# Patient Record
Sex: MALE | Race: WHITE | NOT HISPANIC OR LATINO | Employment: OTHER | URBAN - METROPOLITAN AREA
[De-identification: names, ages, dates, MRNs, and addresses within clinical notes are randomized per-mention and may not be internally consistent; named-entity substitution may affect disease eponyms.]

---

## 2017-03-07 ENCOUNTER — OFFICE VISIT (OUTPATIENT)
Dept: SPORTS MEDICINE | Facility: CLINIC | Age: 67
End: 2017-03-07
Payer: MEDICARE

## 2017-03-07 VITALS
HEART RATE: 94 BPM | DIASTOLIC BLOOD PRESSURE: 83 MMHG | HEIGHT: 75 IN | SYSTOLIC BLOOD PRESSURE: 126 MMHG | BODY MASS INDEX: 39.17 KG/M2 | WEIGHT: 315 LBS

## 2017-03-07 DIAGNOSIS — M25.562 LEFT KNEE PAIN: Primary | ICD-10-CM

## 2017-03-07 DIAGNOSIS — S83.249A MEDIAL MENISCUS TEAR: ICD-10-CM

## 2017-03-07 PROCEDURE — 99999 PR PBB SHADOW E&M-EST. PATIENT-LVL III: CPT | Mod: PBBFAC,,, | Performed by: ORTHOPAEDIC SURGERY

## 2017-03-07 PROCEDURE — 99203 OFFICE O/P NEW LOW 30 MIN: CPT | Mod: S$PBB,,, | Performed by: ORTHOPAEDIC SURGERY

## 2017-03-07 PROCEDURE — 99213 OFFICE O/P EST LOW 20 MIN: CPT | Mod: PBBFAC,PO | Performed by: ORTHOPAEDIC SURGERY

## 2017-03-07 NOTE — MR AVS SNAPSHOT
[facility]  [address]  [facility] [address]  Phone: [phone]                  Shon Asencio   3/7/2017 10:30 AM   Appointment    Description:  Male : 1950   Provider:  Aris Lopez MD   Department:  [facility]                To Do List           Future Appointments        Provider Department Dept Phone    3/23/2017 9:15 AM Aris Lopez MD [facility] [phone]      Goals (5 Years of Data)     None      [facility]     [facility] -  Assistance  Registered nurses in the [facility] Center provide clinical advisement, health education, appointment booking, and other advisory services.  Call for this free service at [phone].             Medications           Message regarding Medications     Verify the changes and/or additions to your medication regime listed below are the same as discussed with your clinician today.  If any of these changes or additions are incorrect, please notify your healthcare provider.             Verify that the below list of medications is an accurate representation of the medications you are currently taking.  If none reported, the list may be blank. If incorrect, please contact your healthcare provider. Carry this list with you in case of emergency.           Current Medications     celecoxib (CELEBREX) 200 MG capsule Take by mouth.    dabigatran etexilate (PRADAXA) 75 mg Cap Take by mouth.    garlic 1 mg Cap Take by mouth.    omega 3-dha-epa-fish oil 1,200 (144-216) mg Cap Take by mouth.           Clinical Reference Information           Allergies as of 3/7/2017     No Known Drug Allergies      Immunizations Administered on Date of Encounter - 3/7/2017     None      [facility] Sign-Up     Activating your [facility] account is as easy as 1-2-3!     1) Visit [email or web address], select Sign Up Now, enter this activation code and your date of birth, then select  Next.  [email or web address]  Expires: 4/21/2017 10:33 AM      2) Create a username and password to use when you visit [facility] in the future and select a security question in case you lose your password and select Next.    3) Enter your e-mail address and click Sign Up!    Additional Information  If you have questions, please e-mail [email or web address] or call [phone] to talk to our [employer] staff. Remember, [facility] is NOT to be used for urgent needs. For medical emergencies, dial 911.         Language Assistance Services     ATTENTION: Language assistance services are available, free of charge. Please call [phone].      ATENCIÓN: Si dakota valente, tiene a deleon disposición servicios gratuitos de asistencia lingüística. Llame al [phone].     [facility] Ý: N?u b?n nói Ti?ng Vi?t, có các d?ch v? h? tr? ngôn ng? mi?n phí dành cho b?n. G?i s? [phone].         [facility] complies with applicable Federal civil rights laws and does not discriminate on the basis of race, color, national origin, age, disability, or sex.

## 2017-03-07 NOTE — PROGRESS NOTES
CC: Left knee pain    66 y.o. Male with a history of left knee pain who tripped and fell directly on his left knee about 2 months ago.  No prior injuries or surgeries to the left knee.  He has failed 4-5 weeks of physical therapy.  He states that the pain is severe and not responding to any conservative care.      He reports that the pain and weakness. It also bothers him at night.    + mechanical symptoms, - instability    Is affecting ADLs.  Pain is 3/10 at it's worst.     REVIEW OF SYSTEMS:  Constitution: Negative. Negative for chills, fever and night sweats.   HENT: Negative for congestion and headaches.    Eyes: Negative for blurred vision, left vision loss and right vision loss.   Cardiovascular: Negative for chest pain and syncope.   Respiratory: Negative for cough and shortness of breath.    Endocrine: Negative for polydipsia, polyphagia and polyuria.   Hematologic/Lymphatic: Negative for bleeding problem. Does not bruise/bleed easily.   Skin: Negative for dry skin, itching and rash.   Musculoskeletal: Negative for falls. Positive for left knee pain and  muscle weakness.   Gastrointestinal: Negative for abdominal pain and bowel incontinence.   Genitourinary: Negative for bladder incontinence and nocturia.   Neurological: Negative for disturbances in coordination, loss of balance and seizures.   Psychiatric/Behavioral: Negative for depression. The patient does not have insomnia.    Allergic/Immunologic: Negative for hives and persistent infections.     PAST MEDICAL HISTORY:    Past Medical History:   Diagnosis Date    Basal cell carcinoma        PAST SURGICAL HISTORY:   History reviewed. No pertinent surgical history.    FAMILY HISTORY:   No family history on file.    SOCIAL HISTORY:   Social History     Social History    Marital status:      Spouse name: N/A    Number of children: N/A    Years of education: N/A     Occupational History    Not on file.     Social History Main Topics    Smoking  "status: Never Smoker    Smokeless tobacco: Not on file    Alcohol use No    Drug use: Not on file    Sexual activity: Not on file     Other Topics Concern    Not on file     Social History Narrative       MEDICATIONS:     Current Outpatient Prescriptions:     celecoxib (CELEBREX) 200 MG capsule, Take by mouth., Disp: , Rfl:     dabigatran etexilate (PRADAXA) 75 mg Cap, Take by mouth., Disp: , Rfl:     garlic 1 mg Cap, Take by mouth., Disp: , Rfl:     omega 3-dha-epa-fish oil 1,200 (144-216) mg Cap, Take by mouth., Disp: , Rfl:     ALLERGIES:   Review of patient's allergies indicates:   Allergen Reactions    No known drug allergies        VITAL SIGNS:   /83  Pulse 94  Ht 6' 3" (1.905 m)  Wt (!) 154.2 kg (340 lb)  BMI 42.5 kg/m2     PHYSICAL EXAMINATION  General:  The patient is alert and oriented x 3.  Mood is pleasant.  Observation of ears, eyes and nose reveal no gross abnormalities.  No labored breathing observed.    LEFT KNEE EXAMINATION     OBSERVATION / INSPECTION   Gait:   Antalgic   Alignment:  Neutral   Scars:   None   Muscle atrophy: Mild  Effusion:  Mild  Warmth:  None   Discoloration:   none     TENDERNESS / CREPITUS (T / C):          T / C      T / C   Patella   - / -   Lateral joint line   + / -   Peripatellar medial  -  Medial joint line    + / -   Peripatellar lateral -  Medial plica   - / -   Patellar tendon -   Popliteal fossa   - / -   Quad tendon   -   Gastrocnemius   -   Prepatellar Bursa - / -   Quadricep   -   Tibial tubercle  -  Thigh/hamstring  -   Pes anserine/HS -  Fibula    -   ITB   - / -  Tibia     -   Tib/fib joint  - / -  LCL    -     MFC   - / -   MCL: Proximal  -    LFC   - / -    Distal   -          ROM: (* = pain)  PASSIVE   ACTIVE    Left :   5 / 0 / 135   5 / 0 / 135     Right :    5 / 0 / 135   5 / 0 / 135    Patellofemoral examination:  See above noted areas of tenderness.   Patella position    Subluxation / dislocation: Centered           Sup. / " Inf;   Normal   Crepitus (PF):    Absent   Patellar Mobility:       Medial-lateral:   Normal    Superior-inferior:  Normal    Inferior tilt   Normal    Patellar tendon:  Normal   Lateral tilt:    Normal   J-sign:     None   Patellofemoral grind:   No pain       MENISCAL SIGNS:     Pain on terminal extension:  -  Pain on terminal flexion:  +  Mechelles maneuver:  + (for pain)  Squat     + (for pain)    LIGAMENT EXAMINATION:  ACL / Lachman:  normal (-1 to 2mm)    PCL-Post.  drawer: normal 0 to 2mm  MCL- Valgus:  normal 0 to 2mm  LCL- Varus:  normal 0 to 2mm  Pivot shift: normal (Equal)   Dial Test: difference c/w other side   At 30° flexion: normal (< 5°)    At 90° flexion: normal (< 5°)   Reverse Pivot Shift:   normal (Equal)     STRENGTH: (* = with pain) PAINFUL SIDE   Quadricep   4+/5   Hamstrin+/5    EXTREMITY NEURO-VASCULAR EXAMINATION:   Sensation:  Grossly intact to light touch all dermatomal regions.   Motor Function:  Fully intact motor function at hip, knee, foot and ankle    DTRs;  quadriceps and  achilles 2+.  No clonus and downgoing Babinski.    Vascular status:  DP and PT pulses 2+, brisk capillary refill, symmetric.     MRI (17):   1.  Medial meniscus tear as above.  2.  Mild fibrillation of the articular cartilage of the lateral patellar facet without subchondral edema.  3.  Small joint effusion with synovitis.     ASSESSMENT:    Left knee pain  Left knee medial mensicus tear    PLAN:   Treatment options were discussed with the patient about his knee.  I reviewed the MRI images with his, including the relevant anatomy, and what this means for his knee.    We discussed both non-operative and operative options for his knee and the risks and benefits of each.    I feel like he would benefit from surgery for his knee.  After a discussion of specific risks and benefits, he would like to proceed with setting this up.    These risks include: bleeding, infection, scarring, damage to  neurovascular structures, damage to cartilage, stiffness, blood clots, pulmonary embolism, swelling, compartment syndrome, need for further surgery, and the risks of anesthesia.      He verbalized his understanding of these risks and wished to proceed with surgery.    A total of 25 minutes were spent face-to-face with the patient during this encounter and over half of that time was spent on counseling about treatment options including his choice for surgery and coordination of his care for his preoperative visits, surgery and post-operative rehab.  We also had a detailed discussion of his expectation level for this procedure as well as any limitations at home or work and with exercising following surgery.     The operative plan is:    left   1. Arthroscopic partial meniscectomy  2. Possible arthroscopic synovectomy  3. Possible arthroscopic loose body removal  4. Possible arthroscopic chondroplasty    Position: Supine    Patient will  need medical clearance from PCP and Cardiology prior to the pre-operative appointment.    If he wishes to proceed, we'll get his scheduled for this at his convenience around his work schedule.    All questions were answered, pt will contact us for questions or concerns in the interim.

## 2017-03-08 DIAGNOSIS — S83.242A ACUTE MEDIAL MENISCUS TEAR, LEFT, INITIAL ENCOUNTER: Primary | ICD-10-CM

## 2017-03-13 ENCOUNTER — TELEPHONE (OUTPATIENT)
Dept: SPORTS MEDICINE | Facility: CLINIC | Age: 67
End: 2017-03-13

## 2017-03-13 NOTE — TELEPHONE ENCOUNTER
Patient asked if he'll need a ride to and from sx and if the sx was an outpatient procedure.    Patient was informed that he will need transportation and it is an outpatient procedure.

## 2017-03-13 NOTE — TELEPHONE ENCOUNTER
----- Message from Amanda Dodd sent at 3/13/2017  2:26 PM CDT -----  Contact: pt  Pt called and states he has some questions to ask before his surgery on 3/22/17. Pt states he would like for the nurse to call him in regards to his questions. Pt can be reached at [phone].

## 2017-03-17 ENCOUNTER — OFFICE VISIT (OUTPATIENT)
Dept: SPORTS MEDICINE | Facility: CLINIC | Age: 67
End: 2017-03-17
Payer: MEDICARE

## 2017-03-17 VITALS
HEART RATE: 103 BPM | DIASTOLIC BLOOD PRESSURE: 78 MMHG | SYSTOLIC BLOOD PRESSURE: 109 MMHG | WEIGHT: 315 LBS | BODY MASS INDEX: 39.17 KG/M2 | HEIGHT: 75 IN

## 2017-03-17 DIAGNOSIS — G89.29 CHRONIC PAIN OF LEFT KNEE: Primary | ICD-10-CM

## 2017-03-17 DIAGNOSIS — M25.562 CHRONIC PAIN OF LEFT KNEE: Primary | ICD-10-CM

## 2017-03-17 PROCEDURE — 99499 UNLISTED E&M SERVICE: CPT | Mod: S$PBB,,, | Performed by: PHYSICIAN ASSISTANT

## 2017-03-17 PROCEDURE — 99999 PR PBB SHADOW E&M-EST. PATIENT-LVL IV: CPT | Mod: PBBFAC,,, | Performed by: PHYSICIAN ASSISTANT

## 2017-03-17 PROCEDURE — 99214 OFFICE O/P EST MOD 30 MIN: CPT | Mod: PBBFAC,PO | Performed by: PHYSICIAN ASSISTANT

## 2017-03-17 RX ORDER — PROMETHAZINE HYDROCHLORIDE 25 MG/1
25 TABLET ORAL EVERY 6 HOURS PRN
Qty: 30 TABLET | Refills: 0 | Status: SHIPPED | OUTPATIENT
Start: 2017-03-17 | End: 2017-03-17 | Stop reason: SDUPTHER

## 2017-03-17 RX ORDER — METOPROLOL SUCCINATE 25 MG/1
25 TABLET, EXTENDED RELEASE ORAL DAILY
COMMUNITY
End: 2019-12-10 | Stop reason: SDUPTHER

## 2017-03-17 RX ORDER — OXYCODONE AND ACETAMINOPHEN 10; 325 MG/1; MG/1
TABLET ORAL
Qty: 60 TABLET | Refills: 0 | Status: SHIPPED | OUTPATIENT
Start: 2017-03-17 | End: 2018-03-12 | Stop reason: ALTCHOICE

## 2017-03-17 RX ORDER — PROMETHAZINE HYDROCHLORIDE 25 MG/1
25 TABLET ORAL EVERY 6 HOURS PRN
Qty: 30 TABLET | Refills: 0 | Status: SHIPPED | OUTPATIENT
Start: 2017-03-17 | End: 2017-10-19

## 2017-03-17 RX ORDER — TRAMADOL HYDROCHLORIDE 50 MG/1
50-100 TABLET ORAL EVERY 6 HOURS PRN
Qty: 60 TABLET | Refills: 0 | Status: SHIPPED | OUTPATIENT
Start: 2017-03-17 | End: 2017-10-19

## 2017-03-17 NOTE — MR AVS SNAPSHOT
[facility]  [address]  [facility] [address]  Phone: [phone]                  Shon Asencio   3/17/2017 9:00 AM   Appointment    Description:  Male : 1950   Provider:  Chay Haney III, PA-C   Department:  [facility]                To Do List           Future Appointments        Provider Department Dept Phone    3/17/2017 9:00 AM Chay Haney III, PA-C [facility] [phone]    3/17/2017 10:00 AM PRE-ADMIT, [facility] [phone]    2017 9:15 AM Aris Lopez MD [facility] [phone]    2017 8:30 AM Chay Haney III, PA-C [facility] [phone]    2017 2:15 PM Jonathan Patterson MD [facility] [place] [phone]      Your Future Surgeries/Procedures     Mar 22, 2017   Surgery with Aris Lopez MD   [facility]    [address]  [facility] [id number] [phone]              Goals (5 Years of Data)     None      [facility]     [facility] Care [facility] - 24/7 Assistance  Registered nurses in the [facility] provide clinical advisement, health education, appointment booking, and other advisory services.  Call for this free service at [phone].             Medications           Message regarding Medications     Verify the changes and/or additions to your medication regime listed below are the same as discussed with your clinician today.  If any of these changes or additions are incorrect, please notify your healthcare provider.             Verify that the below list of medications is an accurate representation of the medications you are currently taking.  If none reported, the list may be blank. If incorrect, please contact your healthcare provider. Carry this list with you in case of emergency.           Current Medications     celecoxib  (CELEBREX) 200 MG capsule Take by mouth.    dabigatran etexilate (PRADAXA) 75 mg Cap Take by mouth.    garlic 1 mg Cap Take by mouth.    omega 3-dha-epa-fish oil 1,200 (144-216) mg Cap Take by mouth.           Clinical Reference Information           Allergies as of 3/17/2017     No Known Drug Allergies      Immunizations Administered on Date of Encounter - 3/17/2017     None      [facility] Sign-Up     Activating your [facility] account is as easy as 1-2-3!     1) Visit [email or web address], select Sign Up Now, enter this activation code and your date of birth, then select Next.  [email or web address]  Expires: 4/21/2017 11:33 AM      2) Create a username and password to use when you visit [facility] in the future and select a security question in case you lose your password and select Next.    3) Enter your e-mail address and click Sign Up!    Additional Information  If you have questions, please e-mail [email or web address] or call [phone] to talk to our [facility] staff. Remember, [facility] is NOT to be used for urgent needs. For medical emergencies, dial 911.         Language Assistance Services     ATTENTION: Language assistance services are available, free of charge. Please call [phone].      ATENCIÓN: Si habla español, tiene a deleon disposición servicios gratuitos de asistencia lingüística. Llame al [phone].     CHÚ Ý: N?u b?n nói Ti?ng Vi?t, có các d?ch v? h? tr? ngôn ng? mi?n phí dành cho b?n. G?i s? [phone].         [facility] complies with applicable Federal civil rights laws and does not discriminate on the basis of race, color, national origin, age, disability, or sex.

## 2017-03-21 ENCOUNTER — ANESTHESIA EVENT (OUTPATIENT)
Dept: SURGERY | Facility: OTHER | Age: 67
End: 2017-03-21
Payer: MEDICARE

## 2017-03-21 ENCOUNTER — HOSPITAL ENCOUNTER (OUTPATIENT)
Dept: PREADMISSION TESTING | Facility: OTHER | Age: 67
Discharge: HOME | End: 2017-03-21
Attending: ORTHOPAEDIC SURGERY
Payer: MEDICARE

## 2017-03-21 VITALS
DIASTOLIC BLOOD PRESSURE: 73 MMHG | HEART RATE: 85 BPM | TEMPERATURE: 98 F | BODY MASS INDEX: 39.17 KG/M2 | WEIGHT: 315 LBS | SYSTOLIC BLOOD PRESSURE: 112 MMHG | OXYGEN SATURATION: 97 % | HEIGHT: 75 IN

## 2017-03-21 RX ORDER — FAMOTIDINE 20 MG/1
20 TABLET, FILM COATED ORAL
Status: CANCELLED | OUTPATIENT
Start: 2017-03-21 | End: 2017-03-21

## 2017-03-21 RX ORDER — CELECOXIB 100 MG/1
100 CAPSULE ORAL DAILY
Status: ON HOLD | COMMUNITY
End: 2017-03-22 | Stop reason: HOSPADM

## 2017-03-21 RX ORDER — OXYCODONE HYDROCHLORIDE 5 MG/1
5 TABLET ORAL ONCE AS NEEDED
Status: CANCELLED | OUTPATIENT
Start: 2017-03-21 | End: 2017-03-21

## 2017-03-21 RX ORDER — MIDAZOLAM HYDROCHLORIDE 5 MG/ML
4 INJECTION INTRAMUSCULAR; INTRAVENOUS
Status: CANCELLED | OUTPATIENT
Start: 2017-03-21

## 2017-03-21 RX ORDER — LIDOCAINE HYDROCHLORIDE 10 MG/ML
1 INJECTION, SOLUTION EPIDURAL; INFILTRATION; INTRACAUDAL; PERINEURAL ONCE
Status: CANCELLED | OUTPATIENT
Start: 2017-03-21 | End: 2017-03-21

## 2017-03-21 RX ORDER — SODIUM CHLORIDE, SODIUM LACTATE, POTASSIUM CHLORIDE, CALCIUM CHLORIDE 600; 310; 30; 20 MG/100ML; MG/100ML; MG/100ML; MG/100ML
INJECTION, SOLUTION INTRAVENOUS CONTINUOUS
Status: CANCELLED | OUTPATIENT
Start: 2017-03-21

## 2017-03-21 NOTE — IP AVS SNAPSHOT
[facility] Location ([facility])  [address] [place], [place] [address]  Phone: [phone]           Patient Discharge Instructions    Our goal is to set you up for success. This packet includes information on your condition, medications, and your home care. It will help you to care for yourself so you don't get sicker.     Please ask your nurse if you have any questions.        There are many details to remember when preparing for your surgery. Here is what you will need to do, please ask your nurse if there are more specific instructions and if you have any questions:    1. 24 hours before procedure Do not smoke or drink alcoholic beverages 24 hours prior to your procedure    2. Eating before procedure Do not eat or drink anything 8 hours before your procedure - this includes gum, mints, and candy.     3. Day of procedure Please remove all jewelry for the procedure. If you wear contact lenses, dentures, hearing aids or glasses, bring a container to put them in during your surgery and give to a family member for safekeeping.  If your doctor has scheduled you for an overnight stay, bring a small overnight bag with any personal items that you need.    4. After procedure Make arrangements in advance for transportation home by a responsible adult. It is not safe to drive a vehicle during the 24 hours following surgery.     PLEASE NOTE: You may be contacted the day before your surgery to confirm your surgery date and arrival time. The Surgery schedule has many variables which may affect the time of your surgery case. Family members should be available if your surgery time changes.                [facility]  Unless otherwise directed by your provider, please contact [facility], our nurse care line that is available for 24/7 assistance.     [phone] (toll-free)    Registered nurses in the [facility] provide clinical advisement, health education, appointment booking, and other  advisory services.                    ** Verify the list of medication(s) below is accurate and up to date. Carry this with you in case of emergency. If your medications have changed, please notify your healthcare provider.             Medication List      TAKE these medications        Additional Info                      celecoxib 100 MG capsule   Commonly known as:  CeleBREX   Refills:  0   Dose:  100 mg   What changed:  Another medication with the same name was removed. Continue taking this medication, and follow the directions you see here.    Instructions:  Take 100 mg by mouth once daily.     Begin Date    AM    Noon    PM    Bedtime       garlic 1 mg Cap   Refills:  0   Dose:  1 tablet    Instructions:  Take 1 tablet by mouth once daily.     Begin Date    AM    Noon    PM    Bedtime       metoprolol succinate 25 MG 24 hr tablet   Commonly known as:  TOPROL-XL   Refills:  0   Dose:  25 mg    Instructions:  Take 25 mg by mouth once daily. Pt. Takes 25 mg in morning and 12.5 in the evening.     Begin Date    AM    Noon    PM    Bedtime       omega 3-dha-epa-fish oil 1,200 (144-216) mg Cap   Refills:  0    Instructions:  Take by mouth.     Begin Date    AM    Noon    PM    Bedtime       oxycodone-acetaminophen  mg per tablet   Commonly known as:  PERCOCET   Quantity:  60 tablet   Refills:  0    Instructions:  Take 1 tablet by mouth every 4-6 hours as needed for pain. Take stool softener with this medication.     Begin Date    AM    Noon    PM    Bedtime       PRADAXA 75 mg Cap   Refills:  0   Generic drug:  dabigatran etexilate    Instructions:  Take by mouth.     Begin Date    AM    Noon    PM    Bedtime       promethazine 25 MG tablet   Commonly known as:  PHENERGAN   Quantity:  30 tablet   Refills:  0   Dose:  25 mg    Instructions:  Take 1 tablet (25 mg total) by mouth every 6 (six) hours as needed for Nausea.     Begin Date    AM    Noon    PM    Bedtime       saw palmetto 80 MG capsule   Refills:  0    Dose:  80 mg    Instructions:  Take 80 mg by mouth once daily. Takes 2 tabs daily     Begin Date    AM    Noon    PM    Bedtime       tramadol 50 mg tablet   Commonly known as:  ULTRAM   Quantity:  60 tablet   Refills:  0   Dose:   mg    Instructions:  Take 1-2 tablets ( mg total) by mouth every 6 (six) hours as needed for Pain.     Begin Date    AM    Noon    PM    Bedtime                  Please bring to all follow up appointments:    1. A copy of your discharge instructions.  2. All medicines you are currently taking in their original bottles.  3. Identification and insurance card.    Please arrive 15 minutes ahead of scheduled appointment time.    Please call 24 hours in advance if you must reschedule your appointment and/or time.        Your Scheduled Appointments     Apr 04, 2017  9:15 AM CDT   Established Patient Visit with Aris Lopez MD   [facility]    [address]  [facility] [address]   [phone]            Apr 05, 2017  8:30 AM CDT   Post OP with Chay Haney III, PA-C   [facility]    [address]  [facility] [address]   [phone]            Apr 05, 2017  2:15 PM CDT   New Patient with Jonathan Patterson MD   [facility] ([facility] )    [address]  [place] [address]   [phone]            May 02, 2017 10:00 AM CDT   Post OP with Aris Lopez MD   [facility]    [address]  [facility] [address]   [phone]              Your Future Surgeries/Procedures     Mar 22, 2017   Surgery with Aris Lopez MD   [facility] ([facility])    [address] [facility] [id number] [phone]                  Discharge Instructions       PRE-ADMIT TESTING -  [phone]    [address] [place] [address]        OUTPATIENT SURGERY UNIT - [phone]    Your surgery has been  scheduled at [facility]. We are pleased to have the opportunity to serve you. For Further Information please call [phone].    On the day of surgery please report to the Information Desk on the 1st floor.    CONTACT YOUR PHYSICIAN'S OFFICE THE DAY PRIOR TO YOUR SURGERY TO OBTAIN YOUR ARRIVAL TIME.     The evening before surgery do not eat anything after 9 p.m. ( this includes hard candy, chewing gum and mints).  You may have GATORADE, POWERADE AND WATER FROM 9 p.m. until leaving home to come to the hospital.   DO NOT DRINK ANY LIQUIDS ON THE WAY TO THE HOSPITAL.     SPECIAL MEDICATION INSTRUCTIONS: TAKE medications checked off by the Anesthesiologist on your Medication List.    Angiogram Patients: Take medications as instructed by your physician, including aspirin.     Surgery Patients:    If you take ASPIRIN - Your PHYSICIAN/SURGEON will need to inform you IF/OR when you need to stop taking aspirin prior to your surgery.     Do Not take any medications containing IBUPROFEN.  Do Not Wear any make-up or dark nail polish   (especially eye make-up) to surgery. If you come to surgery with makeup on you will be required to remove the makeup or nail polish.    Do not shave your surgical area at least 5 days prior to your surgery. The surgical prep will be performed at the hospital according to Infection Control regulations.    Leave all valuables at home.   Do Not wear any jewelry or watches, including any metal in body piercings.  Contact Lens must be removed before surgery. Either do not wear the contact lens or bring a case and solution for storage.  Please bring a container for eyeglasses or dentures as required.  Bring any paperwork your physician has provided, such as consent forms,  history and physicals, doctor's orders, etc.   Bring comfortable clothes that are loose fitting to wear upon discharge. Take into consideration the type of surgery being performed.  Maintain your diet as advised  "per your physician the day prior to surgery.      Adequate rest the night before surgery is advised.   Park in the Parking lot behind the hospital or in the [place] [facility] across the street from the parking lot. Parking is complimentary.  If you will be discharged the same day as your procedure, please arrange for a responsible adult to drive you home or to accompany you if traveling by taxi.   YOU WILL NOT BE PERMITTED TO DRIVE OR TO LEAVE THE HOSPITAL ALONE AFTER SURGERY.   It is strongly recommended that you arrange for someone to remain with you for the first 24 hrs following your surgery.       Thank you for your cooperation.  The Staff of [facility].        Bathing Instructions                                                                 Please shower the evening before and morning of your procedure with    ANTIBACTERIAL SOAP. ( DIAL, etc )  Concentrate on the surgical area   for at least 3 minutes and rinse completely. Dry off as usual.   Do not use any deodorant, powder, body lotions, perfume, after shave or    cologne.                                                Admission Information     Date & Time Provider Department CSN    3/21/2017  7:30 AM Aris Lopez MD [facility] [id number]      Care Providers     Provider Role Specialty Primary office phone    Aris Lopez MD Attending Provider Sports Medicine [phone]      Your Vitals Were     BP Pulse Temp Height Weight SpO2    112/73 85 97.7 °F (36.5 °C) (Oral) 6' 3" (1.905 m) 154.2 kg (340 lb) 97%    BMI                42.5 kg/m2          Recent Lab Values     No lab values to display.      Allergies as of 3/21/2017        Reactions    No Known Drug Allergies       Advance Directives     An advance directive is a document which, in the event you are no longer able to make decisions for yourself, tells your healthcare team what kind of treatment you do or do not want to receive, or who you " would like to make those decisions for you.  If you do not currently have an advance directive, [facility] encourages you to create one.  For more information call:  [phone]), [phone] ([phone]),  or log on to [email or web address].        Smoking Cessation     If you would like to quit smoking:   You may be eligible for free services if you are a [place] resident and started smoking cigarettes before September 1, 1988.  Call the Smoking Cessation Trust (SCT) toll free at (297) 852-1235 or (532) 822-3999.   Call 2-985-QUIT-NOW if you do not meet the above criteria.            Language Assistance Services     ATTENTION: Language assistance services are available, free of charge. Please call [phone].      ATENCIÓN: Si habla español, tiene a deleon disposición servicios gratuitos de asistencia lingüística. Llame al [phone].     CHÚ Ý: N?u b?n nói Ti?ng Vi?t, có các d?ch v? h? tr? ngôn ng? mi?n phí dành cho b?n. G?i s? [phone].        [facility] Information           [facility] Sign-Up     Activating your [facility] account is as easy as 1-2-3!     1) Visit [email or web address], select Sign Up Now, enter this activation code and your date of birth, then select Next.  [email or web address]  Expires: 4/21/2017 11:33 AM      2) Create a username and password to use when you visit [facility] in the future and select a security question in case you lose your password and select Next.    3) Enter your e-mail address and click Sign Up!    Additional Information  If you have questions, please e-mail [email or web address] or call [phone] to talk to our [facility] staff. Remember, [facility] is NOT to be used for urgent needs. For medical emergencies, dial 911.          [facility] complies with applicable Federal civil rights laws and does not discriminate on the basis of race, color, national origin, age, disability, or sex.

## 2017-03-21 NOTE — DISCHARGE INSTRUCTIONS
PRE-ADMIT TESTING -  [phone]    [address]  [facility]        OUTPATIENT SURGERY UNIT - [phone]    Your surgery has been scheduled at [facility]. We are pleased to have the opportunity to serve you. For Further Information please call [phone].    On the day of surgery please report to the Information Desk on the 1st floor.    CONTACT YOUR PHYSICIAN'S OFFICE THE DAY PRIOR TO YOUR SURGERY TO OBTAIN YOUR ARRIVAL TIME.     The evening before surgery do not eat anything after 9 p.m. ( this includes hard candy, chewing gum and mints).  You may have GATORADE, POWERADE AND WATER FROM 9 p.m. until leaving home to come to the hospital.   DO NOT DRINK ANY LIQUIDS ON THE WAY TO THE HOSPITAL.     SPECIAL MEDICATION INSTRUCTIONS: TAKE medications checked off by the Anesthesiologist on your Medication List.    Angiogram Patients: Take medications as instructed by your physician, including aspirin.     Surgery Patients:    If you take ASPIRIN - Your PHYSICIAN/SURGEON will need to inform you IF/OR when you need to stop taking aspirin prior to your surgery.     Do Not take any medications containing IBUPROFEN.  Do Not Wear any make-up or dark nail polish   (especially eye make-up) to surgery. If you come to surgery with makeup on you will be required to remove the makeup or nail polish.    Do not shave your surgical area at least 5 days prior to your surgery. The surgical prep will be performed at the hospital according to Infection Control regulations.    Leave all valuables at home.   Do Not wear any jewelry or watches, including any metal in body piercings.  Contact Lens must be removed before surgery. Either do not wear the contact lens or bring a case and solution for storage.  Please bring a container for eyeglasses or dentures as required.  Bring any paperwork your physician has provided, such as consent forms,  history and physicals, doctor's orders, etc.   Bring comfortable  clothes that are loose fitting to wear upon discharge. Take into consideration the type of surgery being performed.  Maintain your diet as advised per your physician the day prior to surgery.      Adequate rest the night before surgery is advised.   Park in the Parking lot behind the hospital or in the [place] [facility] across the street from the parking lot. Parking is complimentary.  If you will be discharged the same day as your procedure, please arrange for a responsible adult to drive you home or to accompany you if traveling by taxi.   YOU WILL NOT BE PERMITTED TO DRIVE OR TO LEAVE THE HOSPITAL ALONE AFTER SURGERY.   It is strongly recommended that you arrange for someone to remain with you for the first 24 hrs following your surgery.       Thank you for your cooperation.  The Staff of [facility].        Bathing Instructions                                                                 Please shower the evening before and morning of your procedure with    ANTIBACTERIAL SOAP. ( DIAL, etc )  Concentrate on the surgical area   for at least 3 minutes and rinse completely. Dry off as usual.   Do not use any deodorant, powder, body lotions, perfume, after shave or    cologne.

## 2017-03-22 ENCOUNTER — HOSPITAL ENCOUNTER (OUTPATIENT)
Facility: OTHER | Age: 67
Discharge: HOME | End: 2017-03-22
Attending: ORTHOPAEDIC SURGERY | Admitting: ORTHOPAEDIC SURGERY
Payer: MEDICARE

## 2017-03-22 ENCOUNTER — ANESTHESIA (OUTPATIENT)
Dept: SURGERY | Facility: OTHER | Age: 67
End: 2017-03-22
Payer: MEDICARE

## 2017-03-22 ENCOUNTER — SURGERY (OUTPATIENT)
Age: 67
End: 2017-03-22

## 2017-03-22 VITALS
WEIGHT: 315 LBS | SYSTOLIC BLOOD PRESSURE: 115 MMHG | DIASTOLIC BLOOD PRESSURE: 83 MMHG | HEIGHT: 75 IN | TEMPERATURE: 98 F | OXYGEN SATURATION: 98 % | BODY MASS INDEX: 39.17 KG/M2 | HEART RATE: 90 BPM | RESPIRATION RATE: 18 BRPM

## 2017-03-22 DIAGNOSIS — G89.29 CHRONIC PAIN OF LEFT KNEE: ICD-10-CM

## 2017-03-22 DIAGNOSIS — M25.562 CHRONIC PAIN OF LEFT KNEE: ICD-10-CM

## 2017-03-22 DIAGNOSIS — M25.569 KNEE PAIN, UNSPECIFIED CHRONICITY, UNSPECIFIED LATERALITY: Primary | ICD-10-CM

## 2017-03-22 PROCEDURE — 25000003 PHARM REV CODE 250: Performed by: ORTHOPAEDIC SURGERY

## 2017-03-22 PROCEDURE — 63600175 PHARM REV CODE 636 W HCPCS: Performed by: ORTHOPAEDIC SURGERY

## 2017-03-22 PROCEDURE — 63600175 PHARM REV CODE 636 W HCPCS: Performed by: NURSE ANESTHETIST, CERTIFIED REGISTERED

## 2017-03-22 PROCEDURE — 27201423 OPTIME MED/SURG SUP & DEVICES STERILE SUPPLY: Performed by: ORTHOPAEDIC SURGERY

## 2017-03-22 PROCEDURE — 25000003 PHARM REV CODE 250: Performed by: SPECIALIST

## 2017-03-22 PROCEDURE — 36000711: Performed by: ORTHOPAEDIC SURGERY

## 2017-03-22 PROCEDURE — 63600175 PHARM REV CODE 636 W HCPCS: Performed by: SPECIALIST

## 2017-03-22 PROCEDURE — 71000033 HC RECOVERY, INTIAL HOUR: Performed by: ORTHOPAEDIC SURGERY

## 2017-03-22 PROCEDURE — 29880 ARTHRS KNE SRG MNISECTMY M&L: CPT | Mod: LT,GC,, | Performed by: ORTHOPAEDIC SURGERY

## 2017-03-22 PROCEDURE — 36000710: Performed by: ORTHOPAEDIC SURGERY

## 2017-03-22 PROCEDURE — 37000009 HC ANESTHESIA EA ADD 15 MINS: Performed by: ORTHOPAEDIC SURGERY

## 2017-03-22 PROCEDURE — 71000016 HC POSTOP RECOV ADDL HR: Performed by: ORTHOPAEDIC SURGERY

## 2017-03-22 PROCEDURE — 25000003 PHARM REV CODE 250: Performed by: NURSE ANESTHETIST, CERTIFIED REGISTERED

## 2017-03-22 PROCEDURE — 37000008 HC ANESTHESIA 1ST 15 MINUTES: Performed by: ORTHOPAEDIC SURGERY

## 2017-03-22 PROCEDURE — 71000015 HC POSTOP RECOV 1ST HR: Performed by: ORTHOPAEDIC SURGERY

## 2017-03-22 RX ORDER — SODIUM CHLORIDE, SODIUM LACTATE, POTASSIUM CHLORIDE, CALCIUM CHLORIDE 600; 310; 30; 20 MG/100ML; MG/100ML; MG/100ML; MG/100ML
INJECTION, SOLUTION INTRAVENOUS CONTINUOUS
Status: DISCONTINUED | OUTPATIENT
Start: 2017-03-22 | End: 2017-03-22 | Stop reason: HOSPADM

## 2017-03-22 RX ORDER — SODIUM CHLORIDE 0.9 % (FLUSH) 0.9 %
3 SYRINGE (ML) INJECTION
Status: DISCONTINUED | OUTPATIENT
Start: 2017-03-22 | End: 2017-03-22 | Stop reason: HOSPADM

## 2017-03-22 RX ORDER — DIPHENHYDRAMINE HYDROCHLORIDE 50 MG/ML
25 INJECTION INTRAMUSCULAR; INTRAVENOUS EVERY 6 HOURS PRN
Status: DISCONTINUED | OUTPATIENT
Start: 2017-03-22 | End: 2017-03-22 | Stop reason: HOSPADM

## 2017-03-22 RX ORDER — MIDAZOLAM HYDROCHLORIDE 5 MG/ML
4 INJECTION INTRAMUSCULAR; INTRAVENOUS
Status: DISCONTINUED | OUTPATIENT
Start: 2017-03-22 | End: 2017-03-22 | Stop reason: HOSPADM

## 2017-03-22 RX ORDER — EPINEPHRINE 1 MG/ML
INJECTION, SOLUTION INTRACARDIAC; INTRAMUSCULAR; INTRAVENOUS; SUBCUTANEOUS
Status: DISCONTINUED | OUTPATIENT
Start: 2017-03-22 | End: 2017-03-22 | Stop reason: HOSPADM

## 2017-03-22 RX ORDER — OXYCODONE HYDROCHLORIDE 5 MG/1
5 TABLET ORAL
Status: DISCONTINUED | OUTPATIENT
Start: 2017-03-22 | End: 2017-03-22 | Stop reason: HOSPADM

## 2017-03-22 RX ORDER — DEXAMETHASONE SODIUM PHOSPHATE 4 MG/ML
INJECTION, SOLUTION INTRA-ARTICULAR; INTRALESIONAL; INTRAMUSCULAR; INTRAVENOUS; SOFT TISSUE
Status: DISCONTINUED | OUTPATIENT
Start: 2017-03-22 | End: 2017-03-22

## 2017-03-22 RX ORDER — OXYCODONE HYDROCHLORIDE 5 MG/1
5 TABLET ORAL ONCE AS NEEDED
Status: DISCONTINUED | OUTPATIENT
Start: 2017-03-22 | End: 2017-03-22 | Stop reason: HOSPADM

## 2017-03-22 RX ORDER — FAMOTIDINE 20 MG/1
20 TABLET, FILM COATED ORAL
Status: COMPLETED | OUTPATIENT
Start: 2017-03-22 | End: 2017-03-22

## 2017-03-22 RX ORDER — ACETAMINOPHEN 10 MG/ML
INJECTION, SOLUTION INTRAVENOUS
Status: DISCONTINUED | OUTPATIENT
Start: 2017-03-22 | End: 2017-03-22

## 2017-03-22 RX ORDER — MEPERIDINE HYDROCHLORIDE 50 MG/ML
12.5 INJECTION INTRAMUSCULAR; INTRAVENOUS; SUBCUTANEOUS ONCE AS NEEDED
Status: DISCONTINUED | OUTPATIENT
Start: 2017-03-22 | End: 2017-03-22 | Stop reason: HOSPADM

## 2017-03-22 RX ORDER — METOPROLOL TARTRATE 1 MG/ML
INJECTION, SOLUTION INTRAVENOUS
Status: DISCONTINUED | OUTPATIENT
Start: 2017-03-22 | End: 2017-03-22

## 2017-03-22 RX ORDER — FENTANYL CITRATE 50 UG/ML
INJECTION, SOLUTION INTRAMUSCULAR; INTRAVENOUS
Status: DISCONTINUED | OUTPATIENT
Start: 2017-03-22 | End: 2017-03-22

## 2017-03-22 RX ORDER — PROPOFOL 10 MG/ML
VIAL (ML) INTRAVENOUS
Status: DISCONTINUED | OUTPATIENT
Start: 2017-03-22 | End: 2017-03-22

## 2017-03-22 RX ORDER — SODIUM CHLORIDE 0.9 % (FLUSH) 0.9 %
3 SYRINGE (ML) INJECTION EVERY 8 HOURS
Status: DISCONTINUED | OUTPATIENT
Start: 2017-03-22 | End: 2017-03-22 | Stop reason: HOSPADM

## 2017-03-22 RX ORDER — LIDOCAINE HYDROCHLORIDE 10 MG/ML
1 INJECTION, SOLUTION EPIDURAL; INFILTRATION; INTRACAUDAL; PERINEURAL ONCE
Status: DISCONTINUED | OUTPATIENT
Start: 2017-03-22 | End: 2017-03-22 | Stop reason: HOSPADM

## 2017-03-22 RX ORDER — PHENYLEPHRINE HYDROCHLORIDE 10 MG/ML
INJECTION INTRAVENOUS
Status: DISCONTINUED | OUTPATIENT
Start: 2017-03-22 | End: 2017-03-22

## 2017-03-22 RX ORDER — ONDANSETRON 2 MG/ML
4 INJECTION INTRAMUSCULAR; INTRAVENOUS ONCE AS NEEDED
Status: DISCONTINUED | OUTPATIENT
Start: 2017-03-22 | End: 2017-03-22 | Stop reason: HOSPADM

## 2017-03-22 RX ORDER — HYDROMORPHONE HYDROCHLORIDE 2 MG/ML
0.4 INJECTION, SOLUTION INTRAMUSCULAR; INTRAVENOUS; SUBCUTANEOUS EVERY 5 MIN PRN
Status: DISCONTINUED | OUTPATIENT
Start: 2017-03-22 | End: 2017-03-22 | Stop reason: HOSPADM

## 2017-03-22 RX ORDER — FENTANYL CITRATE 50 UG/ML
25 INJECTION, SOLUTION INTRAMUSCULAR; INTRAVENOUS EVERY 5 MIN PRN
Status: DISCONTINUED | OUTPATIENT
Start: 2017-03-22 | End: 2017-03-22 | Stop reason: HOSPADM

## 2017-03-22 RX ORDER — BUPIVACAINE HYDROCHLORIDE 2.5 MG/ML
INJECTION, SOLUTION EPIDURAL; INFILTRATION; INTRACAUDAL
Status: DISCONTINUED | OUTPATIENT
Start: 2017-03-22 | End: 2017-03-22 | Stop reason: HOSPADM

## 2017-03-22 RX ORDER — LIDOCAINE HCL/PF 100 MG/5ML
SYRINGE (ML) INTRAVENOUS
Status: DISCONTINUED | OUTPATIENT
Start: 2017-03-22 | End: 2017-03-22

## 2017-03-22 RX ORDER — ONDANSETRON 2 MG/ML
INJECTION INTRAMUSCULAR; INTRAVENOUS
Status: DISCONTINUED | OUTPATIENT
Start: 2017-03-22 | End: 2017-03-22

## 2017-03-22 RX ADMIN — PHENYLEPHRINE HYDROCHLORIDE 200 MCG: 10 INJECTION INTRAVENOUS at 08:03

## 2017-03-22 RX ADMIN — DEXAMETHASONE SODIUM PHOSPHATE 4 MG: 4 INJECTION, SOLUTION INTRAMUSCULAR; INTRAVENOUS at 08:03

## 2017-03-22 RX ADMIN — FENTANYL CITRATE 100 MCG: 50 INJECTION, SOLUTION INTRAMUSCULAR; INTRAVENOUS at 08:03

## 2017-03-22 RX ADMIN — SODIUM CHLORIDE, SODIUM LACTATE, POTASSIUM CHLORIDE, AND CALCIUM CHLORIDE: 600; 310; 30; 20 INJECTION, SOLUTION INTRAVENOUS at 07:03

## 2017-03-22 RX ADMIN — HYDROMORPHONE HYDROCHLORIDE 0.4 MG: 2 INJECTION, SOLUTION INTRAMUSCULAR; INTRAVENOUS; SUBCUTANEOUS at 09:03

## 2017-03-22 RX ADMIN — ACETAMINOPHEN 1000 MG: 10 INJECTION, SOLUTION INTRAVENOUS at 08:03

## 2017-03-22 RX ADMIN — FAMOTIDINE 20 MG: 20 TABLET, FILM COATED ORAL at 07:03

## 2017-03-22 RX ADMIN — EPINEPHRINE 6 ML: 1 INJECTION, SOLUTION INTRAMUSCULAR; SUBCUTANEOUS at 08:03

## 2017-03-22 RX ADMIN — OXYCODONE HYDROCHLORIDE 5 MG: 5 TABLET ORAL at 09:03

## 2017-03-22 RX ADMIN — PROPOFOL 300 MG: 10 INJECTION, EMULSION INTRAVENOUS at 08:03

## 2017-03-22 RX ADMIN — ONDANSETRON 4 MG: 2 INJECTION INTRAMUSCULAR; INTRAVENOUS at 08:03

## 2017-03-22 RX ADMIN — MIDAZOLAM HYDROCHLORIDE 4 MG: 5 INJECTION, SOLUTION INTRAMUSCULAR; INTRAVENOUS at 07:03

## 2017-03-22 RX ADMIN — Medication 20 MG: at 08:03

## 2017-03-22 RX ADMIN — Medication 30 MG: at 08:03

## 2017-03-22 RX ADMIN — METOPROLOL TARTRATE 5 MG: 5 INJECTION, SOLUTION INTRAVENOUS at 08:03

## 2017-03-22 RX ADMIN — LIDOCAINE HYDROCHLORIDE 100 MG: 20 INJECTION, SOLUTION INTRAVENOUS at 08:03

## 2017-03-22 RX ADMIN — CEFAZOLIN SODIUM 3 ML: 2 SOLUTION INTRAVENOUS at 08:03

## 2017-03-22 RX ADMIN — BUPIVACAINE HYDROCHLORIDE 30 ML: 2.5 INJECTION, SOLUTION EPIDURAL; INFILTRATION; INTRACAUDAL; PERINEURAL at 08:03

## 2017-03-22 RX ADMIN — CALCIUM CHLORIDE 500 MG: 100 INJECTION, SOLUTION INTRAVENOUS at 08:03

## 2017-03-22 NOTE — INTERVAL H&P NOTE
The patient has been examined and the H&P has been reviewed:    I concur with the findings and no changes have occurred since H&P was written.    Anesthesia/Surgery risks, benefits and alternative options discussed and understood by patient/family.          Active Hospital Problems    Diagnosis  POA    Chronic pain of left knee [M25.562, G89.29]  Yes      Resolved Hospital Problems    Diagnosis Date Resolved POA   No resolved problems to display.

## 2017-03-22 NOTE — IP AVS SNAPSHOT
[facility] Location ([facility])  [address] [place] [id number]  Phone: [phone]           Patient Discharge Instructions     Our goal is to set you up for success. This packet includes information on your condition, medications, and your home care. It will help you to care for yourself so you don't get sicker and need to go back to the hospital.     Please ask your nurse if you have any questions.        There are many details to remember when preparing to leave the hospital. Here is what you will need to do:    1. Take your medicine. If you are prescribed medications, review your Medication List in the following pages. You may have new medications to  at the pharmacy and others that you'll need to stop taking. Review the instructions for how and when to take your medications. Talk with your doctor or nurses if you are unsure of what to do.     2. Go to your follow-up appointments. Specific follow-up information is listed in the following pages. Your may be contacted by a transition nurse or clinical provider about future appointments. Be sure we have all of the phone numbers to reach you, if needed. Please contact your provider's office if you are unable to make an appointment.     3. Watch for warning signs. Your doctor or nurse will give you detailed warning signs to watch for and when to call for assistance. These instructions may also include educational information about your condition. If you experience any of warning signs to your health, call your doctor.               ** Verify the list of medication(s) below is accurate and up to date. Carry this with you in case of emergency. If your medications have changed, please notify your healthcare provider.             Medication List      CONTINUE taking these medications        Additional Info                      garlic 1 mg Cap   Refills:  0   Dose:  1 tablet    Instructions:  Take 1 tablet by mouth once daily.     Begin Date     AM    Noon    PM    Bedtime       metoprolol succinate 25 MG 24 hr tablet   Commonly known as:  TOPROL-XL   Refills:  0   Dose:  25 mg    Instructions:  Take 25 mg by mouth once daily. Pt. Takes 25 mg in morning and 12.5 in the evening.     Begin Date    AM    Noon    PM    Bedtime       omega 3-dha-epa-fish oil 1,200 (144-216) mg Cap   Refills:  0    Instructions:  Take by mouth.     Begin Date    AM    Noon    PM    Bedtime       oxycodone-acetaminophen  mg per tablet   Commonly known as:  PERCOCET   Quantity:  60 tablet   Refills:  0    Instructions:  Take 1 tablet by mouth every 4-6 hours as needed for pain. Take stool softener with this medication.     Begin Date    AM    Noon    PM    Bedtime       PRADAXA 75 mg Cap   Refills:  0   Generic drug:  dabigatran etexilate    Instructions:  Take by mouth.     Begin Date    AM    Noon    PM    Bedtime       promethazine 25 MG tablet   Commonly known as:  PHENERGAN   Quantity:  30 tablet   Refills:  0   Dose:  25 mg    Instructions:  Take 1 tablet (25 mg total) by mouth every 6 (six) hours as needed for Nausea.     Begin Date    AM    Noon    PM    Bedtime       saw palmetto 80 MG capsule   Refills:  0   Dose:  80 mg    Instructions:  Take 80 mg by mouth once daily. Takes 2 tabs daily     Begin Date    AM    Noon    PM    Bedtime       tramadol 50 mg tablet   Commonly known as:  ULTRAM   Quantity:  60 tablet   Refills:  0   Dose:   mg    Instructions:  Take 1-2 tablets ( mg total) by mouth every 6 (six) hours as needed for Pain.     Begin Date    AM    Noon    PM    Bedtime         STOP taking these medications     celecoxib 100 MG capsule   Commonly known as:  CeleBREX                  Please bring to all follow up appointments:    1. A copy of your discharge instructions.  2. All medicines you are currently taking in their original bottles.  3. Identification and insurance card.    Please arrive 15 minutes ahead of scheduled appointment  time.    Please call 24 hours in advance if you must reschedule your appointment and/or time.        Your Scheduled Appointments     Apr 04, 2017  9:15 AM CDT   Established Patient Visit with Aris Lopez MD   [facility]    [address]  [facility] [address]   [phone]            Apr 05, 2017  8:30 AM CDT   Post OP with Chay Haney III, PA-C   [facility]    [address]  [facility] [address]   [phone]            Apr 05, 2017  2:15 PM CDT   New Patient with Jonathan Patterson MD   [facility] ([facility] )    [address]  [place] [address]   [phone]            May 02, 2017 10:00 AM CDT   Post OP with Aris Lopez MD   [facility]    [address]  [facility] [id number] [phone]              Follow-up Information     Follow up with Aris Lopez MD In 2 weeks.    Specialties:  Sports Medicine, Orthopedic Surgery    Contact information:    [address]  [place] [address]  [phone]          Discharge Instructions     Future Orders    Call MD for:  difficulty breathing or increased cough     Call MD for:  persistent dizziness, light-headedness, or visual disturbances     Call MD for:  persistent nausea and vomiting or diarrhea     Call MD for:  redness, tenderness, or signs of infection (pain, swelling, redness, odor or green/yellow discharge around incision site)     Call MD for:  severe persistent headache     Call MD for:  severe uncontrolled pain     Call MD for:  temperature >100.4     Call MD for:  worsening rash     Diet general     Questions:    Total calories:      Fat restriction, if any:      Protein restriction, if any:      Na restriction, if any:      Fluid restriction:      Additional restrictions:      Weight bearing restrictions (specify)     Comments:    WBAT        Discharge Instructions            Discharge Instructions: Using Crutches (Weight-Bearing)  Your healthcare provider has prescribed crutches for you. A healthy leg can support your body weight, but when you have an injured leg or foot, you need to keep weight off it. Once you are told that you can put some weight on your leg, use a weight-bearing method of walking as the leg heals. Depending on your arm strength and balance, you can either step to or step through. Practice will help you learn to step through so that you can cover more ground with each step.          Before you use crutches  Be prepared with the following tips:  · Remove throw rugs, electrical cords, and anything else that may cause you to fall.  · Arrange your household to keep the items you need handy. Keep everything else out of the way.  · Find a backpack, ondina pack, or apron, or use pockets to carry things. This will help you keep your hands free.    Standing with crutches  Use the balanced standing (tripod) position when you start or end a movement. Also use it whenever you're standing for a length of time.  · Move your crutches in front of you about 12 inches.  · Find your balance.  · Be sure not to rest your armpits on the pads.    Walking with crutches  Follow these tips:  · Start in a balanced standing (tripod) position.  · Step forward with your affected foot.  · Land lightly between your crutches.  · Squeeze the pads against the sides of your chest.  · Support your weight with your hands and your affected leg.  · Press down on the handgrips.    Step to  This method includes the following:  · Lift your unaffected foot and step to the crutches.  · Land on your unaffected foot, between your crutches.  · Keep the knee slightly bent.  · Reach forward and out with the crutches to begin the next step.    Step through  This method includes the following:  · Lift the unaffected foot.  · Step forward through the crutches.  · Land on the unaffected foot, with the heel  slightly in front of the toe of the other foot.  · Keep the knee slightly bent.  · Reach forward and out with the crutches to begin the next step.    Follow-up  Make a follow-up appointment as directed by your healthcare provider.     When to call your healthcare provider  Call your healthcare provider right away if you have any of the following:    · Sudden or increased shortness of breath  · Sudden chest pain or localized chest pain with coughing  · Fever above 100.4°F (38.0°C)  · Increasing redness, tenderness, or swelling at theincision site or in the injured limb  · Drainage from the incision or injured limb  · Opening of the incision or injury  · Increasing pain, with or without activity     Discharge Instructions: After Your Surgery  Youve just had surgery. During surgery you were given medicine called anesthesia to keep you relaxed and free of pain. After surgery you may have some pain or nausea. This is common. Here are some tips for feeling better and getting well after surgery.     Stay on schedule with your medication.     Going home  Your doctor or nurse will show you how to take care of yourself when you go home. He or she will also answer your questions. Have an adult family member or friend drive you home. For the first 24 hours after your surgery:    · Do not drive or use heavy equipment.  · Do not make important decisions or sign legal papers.  · Do not drink alcohol.  · Have someone stay with you, if needed. He or she can watch for problems and help keep you safe.    Be sure to go to all follow-up visits with your doctor. And rest after your surgery for as long as your doctor tells you to.    Coping with pain  If you have pain after surgery, pain medicine will help you feel better. Take it as told, before pain becomes severe. Also, ask your doctor or pharmacist about other ways to control pain. This might be with heat, ice, or relaxation. And follow any other instructions your surgeon or nurse  gives you.    Tips for taking pain medicine  To get the best relief possible, remember these points:    · Pain medicines can upset your stomach. Taking them with a little food may help.  · Most pain relievers taken by mouth need at least 20 to 30 minutes to start to work.  · Taking medicine on a schedule can help you remember to take it. Try to time your medicine so that you can take it before starting an activity. This might be before you get dressed, go for a walk, or sit down for dinner.  · Constipation is a common side effect of pain medicines. Call your doctor before taking any medicines such as laxatives or stool softeners to help ease constipation. Also ask if you should skip any foods. Drinking lots of fluids and eating foods such as fruits and vegetables that are high in fiber can also help. Remember, do not take laxatives unless your surgeon has prescribed them.  · Drinking alcohol and taking pain medicine can cause dizziness and slow your breathing. It can even be deadly. Do not drink alcohol while taking pain medicine.  · Pain medicine can make you react more slowly to things. Do not drive or run machinery while taking pain medicine.    Your health care provider may tell you to take acetaminophen to help ease your pain. Ask him or her how much you are supposed to take each day. Acetaminophen or other pain relievers may interact with your prescription medicines or other over-the-counter (OTC) drugs. Some prescription medicines have acetaminophen and other ingredients. Using both prescription and OTC acetaminophen for pain can cause you to overdose. Read the labels on your OTC medicines with care. This will help you to clearly know the list of ingredients, how much to take, and any warnings. It may also help you not take too much acetaminophen. If you have questions or do not understand the information, ask your pharmacist or health care provider to explain it to you before you take the OTC  medicine.    Managing nausea  Some people have an upset stomach after surgery. This is often because of anesthesia, pain, or pain medicine, or the stress of surgery. These tips will help you handle nausea and eat healthy foods as you get better. If you were on a special food plan before surgery, ask your doctor if you should follow it while you get better. These tips may help:    · Do not push yourself to eat. Your body will tell you when to eat and how much.  · Start off with clear liquids and soup. They are easier to digest.  · Next try semi-solid foods, such as mashed potatoes, applesauce, and gelatin, as you feel ready.  · Slowly move to solid foods. Dont eat fatty, rich, or spicy foods at first.  · Do not force yourself to have 3 large meals a day. Instead eat smaller amounts more often.  · Take pain medicines with a small amount of solid food, such as crackers or toast, to avoid nausea.     Call your surgeon if  · You still have pain an hour after taking medicine. The medicine may not be strong enough.  · You feel too sleepy, dizzy, or groggy. The medicine may be too strong.  · You have side effects like nausea, vomiting, or skin changes, such as rash, itching, or hives.       If you have obstructive sleep apnea  You were given anesthesia medicine during surgery to keep you comfortable and free of pain. After surgery, you may have more apnea spells because of this medicine and other medicines you were given. The spells may last longer than usual.   At home:    · Keep using the continuous positive airway pressure (CPAP) device when you sleep. Unless your health care provider tells you not to, use it when you sleep, day or night. CPAP is a common device used to treat obstructive sleep apnea.  · Talk with your provider before taking any pain medicine, muscle relaxants, or sedatives. Your provider will tell you about the possible dangers of taking these medicines.    © 2647-3203 [employer] [address]  [place] [address]. All rights reserved. This information is not intended as a substitute for professional medical care. Always follow your healthcare professional's instructions.    PLEASE FOLLOW ANY OTHER INSTRUCTIONS PROVIDED TO YOU BY DR. RAY!             [facility] [facility] [address]                                                                                          [phone]                   Postoperative Instructions for Knee Surgery                 Your Surgery Included:   Open               Arthroscopic   [] Ligament Repair       [] Diagnostic           [] ACL     [] PCL     [] MCL     [] PLLC      [] Synovectomy / Plica Removal [] Meniscal Cartilage Repair / Transplantation      [] Lysis of Adhesions / Manipulation [] Articular Cartilage Repair      [] Interval Release           [] Microfracture       [] OATS   [] ACI      [x] Meniscectomy           [] Osteochondral Allograft      [] Meniscal Cartilage Repair  [] Patellar Realignment       [x] Debridement          [] Lateral Release   [] Ligament Repair      [] Articular Cartilage Repair          [] Extensor Mechanism             []   Microfracture  []  OATS         []  Cartilage Biopsy [] Tendon Repair          [] Ligament Reconstruction          [] Patella                  [] Quadriceps             []   ACL    []   PCL  [] High Tibial Osteotomy       [] PRP Arthrocentesis  [] Joint Replacement         [] Amniox Arthrocentesis           [] Unicompartmental   [] Patellofemoral                    [] Total Knee                  Call our office ([phone]) immediately if you experience any of the following:       Excessive bleeding or pus like drainage at the incision site       Uncontrollable pain not relieved by pain medication       Excessive swelling or redness at the incision site       Fever above 101.5 degrees not controlled with Tylenol or Motrin       Shortness of Breath       Any foul  odor or blistering from the surgery site    FOR EMERGENCIES: If any unusual problems or difficulties occur, call our office at [phone], or page the  at [phone] who will direct your call appropriately    1.   Pain Management: A cold therapy cuff, pain medications, local injections, and in some cases, regional anesthesia injections are used to manage your post-operative pain. The decision to use each of these options is based on their risks and benefits.     Medications: You were given one or more of the following medication prescriptions before leaving the hospital. Have the prescriptions filled at a pharmacy on your way home and follow the instructions on the bottles. If you need a refill, please call your pharmacy.      Narcotic Medication (usually Vicodin ES, Lortab, Percocet or Nucynta): Begin taking the medication before your knee starts to hurt. Some patients do not like to take any medication, but if you wait until your pain is severe before taking, you will be very uncomfortable for several hours waiting for the narcotic to work. Always take with food.     Nausea / Vomiting: For this issue, we prescribe Phenergan, use this medication as directed.     Cold Therapy: You may have been sent home with a [facility] cold therapy unit and wrap for your knee. Fill with ice and water to the indicated fill line and use throughout the day for the first two days and then as needed to help relieve pain and control swelling.      Regional Anesthesia Injections (Blocks): You may have been given a regional nerve block either before or after surgery. This may make your entire leg numb for 24-36 hours.                            * Proceed with caution when bearing weight on your leg.     2.   Diet: Eat a bland diet for the first day after surgery. Progress your diet as tolerated. Constipation may occur with Narcotic usage, contact our office if you are experiencing constipation.    3.   Activity: Limit your  activity during the first 48 hours, keep your leg elevated with pillows under your heel. After the first 48 hours at home, increase your activity level based on your symptoms.    4.   Dressing Change: Remove the dressing on the 3rd day. It is normal for some blood to be seen on the dressings. It is also normal for you to see apparent bruising on the skin around your knee when you remove the dressing. If present, leave the steri-strip tape across the incisions. If you are concerned by the drainage or the appearance of your knee, please call one of the numbers listed below.    5.   Showering: You may shower on the 10th day after surgery if the wound is dry and clean, but do not let the wound soak in water until sutures are removed. Do not submerge in any water until after your postoperative appointment in clinic.    6.   Your procedure did not require a post-operative brace.    7.   Your procedure did not require a Continuous Passive Motion (CPM) device.    8.   Weight Bearing: You may have been sent home with crutches. If instructed (see below), use these crutches at all times unless at complete rest.                  [x] Full weight bearing            [x]  NOW        9.  Knee Exercises: Begin these exercises the first day after surgery in order to help you regain your motion and strength. You may do the following marked exercises:     [x] Quad Sets - Begin activating your quadriceps muscle by driving your          knee downward into full knee extension while seated on a table or bed   with a towel rolled and propped under your heel     [x] Straight Leg Raise (SLR) - While justin your quadriceps muscle, lift     your fully extended leg to the level of your non-operative knee (as shown)     [x] Heel Slides - With the knee straight, slide your heel slowly toward your   buttocks, hold at the endpoint for 10-15 seconds, then slowly straighten     [x] Ankle pumps - With your knee straight, move your ankle in a  ""pumping"    fashion to activate your calf and leg muscles      10.  Physical Therapy: Physical therapy is an essential component to your recovery from surgery. Your physical therapy will start in 3 days.    FIRST POSTOPERATIVE VISIT: As scheduled.     Select on Hyperlink below to print updated instructions from [employer]  [email or web address]    Select on Hyperlink below to print updated instructions from [employer]  [employer]-Scope Breg Brace Instructions        Discharge Instructions: After Your Surgery  Youve just had surgery. During surgery you were given medicine called anesthesia to keep you relaxed and free of pain. After surgery you may have some pain or nausea. This is common. Here are some tips for feeling better and getting well after surgery.     Stay on schedule with your medication.   Going home  Your doctor or nurse will show you how to take care of yourself when you go home. He or she will also answer your questions. Have an adult family member or friend drive you home. For the first 24 hours after your surgery:  · Do not drive or use heavy equipment.  · Do not make important decisions or sign legal papers.  · Do not drink alcohol.  · Have someone stay with you, if needed. He or she can watch for problems and help keep you safe.  Be sure to go to all follow-up visits with your doctor. And rest after your surgery for as long as your doctor tells you to.  Coping with pain  If you have pain after surgery, pain medicine will help you feel better. Take it as told, before pain becomes severe. Also, ask your doctor or pharmacist about other ways to control pain. This might be with heat, ice, or relaxation. And follow any other instructions your surgeon or nurse gives you.  Tips for taking pain medicine  To get the best relief possible, remember these points:  · Pain medicines can upset your stomach. Taking them with a little food may help.  · Most pain relievers taken by mouth need at least 20 to 30 minutes to start to " work.  · Taking medicine on a schedule can help you remember to take it. Try to time your medicine so that you can take it before starting an activity. This might be before you get dressed, go for a walk, or sit down for dinner.  · Constipation is a common side effect of pain medicines. Call your doctor before taking any medicines such as laxatives or stool softeners to help ease constipation. Also ask if you should skip any foods. Drinking lots of fluids and eating foods such as fruits and vegetables that are high in fiber can also help. Remember, do not take laxatives unless your surgeon has prescribed them.  · Drinking alcohol and taking pain medicine can cause dizziness and slow your breathing. It can even be deadly. Do not drink alcohol while taking pain medicine.  · Pain medicine can make you react more slowly to things. Do not drive or run machinery while taking pain medicine.  Your health care provider may tell you to take acetaminophen to help ease your pain. Ask him or her how much you are supposed to take each day. Acetaminophen or other pain relievers may interact with your prescription medicines or other over-the-counter (OTC) drugs. Some prescription medicines have acetaminophen and other ingredients. Using both prescription and OTC acetaminophen for pain can cause you to overdose. Read the labels on your OTC medicines with care. This will help you to clearly know the list of ingredients, how much to take, and any warnings. It may also help you not take too much acetaminophen. If you have questions or do not understand the information, ask your pharmacist or health care provider to explain it to you before you take the OTC medicine.  Managing nausea  Some people have an upset stomach after surgery. This is often because of anesthesia, pain, or pain medicine, or the stress of surgery. These tips will help you handle nausea and eat healthy foods as you get better. If you were on a special food plan  before surgery, ask your doctor if you should follow it while you get better. These tips may help:  · Do not push yourself to eat. Your body will tell you when to eat and how much.  · Start off with clear liquids and soup. They are easier to digest.  · Next try semi-solid foods, such as mashed potatoes, applesauce, and gelatin, as you feel ready.  · Slowly move to solid foods. Dont eat fatty, rich, or spicy foods at first.  · Do not force yourself to have 3 large meals a day. Instead eat smaller amounts more often.  · Take pain medicines with a small amount of solid food, such as crackers or toast, to avoid nausea.     Call your surgeon if  · You still have pain an hour after taking medicine. The medicine may not be strong enough.  · You feel too sleepy, dizzy, or groggy. The medicine may be too strong.  · You have side effects like nausea, vomiting, or skin changes, such as rash, itching, or hives.       If you have obstructive sleep apnea  You were given anesthesia medicine during surgery to keep you comfortable and free of pain. After surgery, you may have more apnea spells because of this medicine and other medicines you were given. The spells may last longer than usual.   At home:  · Keep using the continuous positive airway pressure (CPAP) device when you sleep. Unless your health care provider tells you not to, use it when you sleep, day or night. CPAP is a common device used to treat obstructive sleep apnea.  · Talk with your provider before taking any pain medicine, muscle relaxants, or sedatives. Your provider will tell you about the possible dangers of taking these medicines.  Date Last Reviewed: 10/16/2014  © 8132-0573 [employer]. [address] [place] [address], [place] [address]. All rights reserved. This information is not intended as a substitute for professional medical care. Always follow your healthcare professional's instructions.            Primary Diagnosis     Your primary diagnosis  "was:  Chronic Pain Of Left Knee      Admission Information     Date & Time Provider Department CSN    3/22/2017  6:14 AM Aris Lopez MD [facility] [id number]      Care Providers     Provider Role Specialty Primary office phone    Aris Lopez MD Attending Provider Sports Medicine [phone]    Aris Lopez MD Surgeon  Sports Medicine [phone]      Your Vitals Were     BP Pulse Temp Resp Height Weight    123/70 84 97.6 °F (36.4 °C) (Oral) 16 6' 3" (1.905 m) 154.2 kg (340 lb)    SpO2 BMI             97% 42.5 kg/m2         Recent Lab Values     No lab values to display.      Allergies as of 3/22/2017        Reactions    No Known Drug Allergies       [facility]     [facility] Nurse Care Line - 24/7 Assistance  Unless otherwise directed by your provider, please contact [facility], our nurse care line that is available for 24/7 assistance.     Registered nurses in the [facility] provide clinical advisement, health education, appointment booking, and other advisory services.  Call for this free service at [phone].        Advance Directives     An advance directive is a document which, in the event you are no longer able to make decisions for yourself, tells your healthcare team what kind of treatment you do or do not want to receive, or who you would like to make those decisions for you.  If you do not currently have an advance directive, [facility] encourages you to create one.  For more information call:  [phone]), [phone] ([phone]),  or log on to [email or web address].        Smoking Cessation     If you would like to quit smoking:   You may be eligible for free services if you are a [place] resident and started smoking cigarettes before September 1, 1988.  Call the [employer]oking Cessation [employer] (SCT) toll free at (298) 820-9454 or (463) 705-0900.   Call 0-518-QUIT-NOW if you do not meet the above " criteria.            Language Assistance Services     ATTENTION: Language assistance services are available, free of charge. Please call [phone].      ATENCIÓN: Si dakota valente, tiene a deleon disposición servicios gratuitos de asistencia lingüística. Llame al [phone].     CHÚ Ý: N?u b?n nói Ti?ng Vi?t, có các d?ch v? h? tr? ngôn ng? mi?n phí dành cho b?n. G?i s? [phone].        [facility] Information           [facility] Sign-Up     Activating your [facility] account is as easy as 1-2-3!     1) Visit [email or web address], select Sign Up Now, enter this activation code and your date of birth, then select Next.  [email or web address]  Expires: 4/21/2017 11:33 AM      2) Create a username and password to use when you visit [facility] in the future and select a security question in case you lose your password and select Next.    3) Enter your e-mail address and click Sign Up!    Additional Information  If you have questions, please e-mail [email or web address] or call [phone] to talk to our [facility] staff. Remember, [facility] is NOT to be used for urgent needs. For medical emergencies, dial 911.          [facility] complies with applicable Federal civil rights laws and does not discriminate on the basis of race, color, national origin, age, disability, or sex.

## 2017-03-22 NOTE — H&P (VIEW-ONLY)
Shon Asencio  is here for a completion of his perioperative paperwork. he  Is scheduled to undergo    left   1. Arthroscopic partial meniscectomy  2. Possible arthroscopic synovectomy  3. Possible arthroscopic loose body removal  4. Possible arthroscopic chondroplasty  on 3/22/17.      He is a healthy individual and does need clearance for this procedure, which he has received from his outside PCP and Cardiologist which I saw in person. He will bring this to [facility].     Risks, indications and benefits of the surgical procedure were discussed with the patient. All questions with regard to surgery, rehab, expected return to functional activities, activities of daily living and recreational endeavors were answered to his satisfaction.    Once no other questions were asked, a brief history and physical exam was then performed.      PHYSICAL EXAM:  GEN: A&Ox3, WD WN NAD  HEENT: WNL  CHEST: CTAB, no W/R/R  HEART: RRR, no M/R/G  ABD: Soft, NT ND, BS x4 QUADS  MS; See Epic  NEURO: CN II-XII intact       The surgical consent was then reviewed with the patient, who agreed with all the contents of the consent form and it was signed. he was then given the [facility] surgery packet to bring with him to [facility] for the anesthesia portion of his perioperative paperwork.   For all physicians except for Dr. Lopez, we will email and possibly fax the consent forms and booking sheets to [facility] pre-[facility].    PHYSICAL THERAPY:  He was also instructed regarding physical therapy and will begin on  POD3-5. He was given a copy of the original prescription to schedule. Another copy of this prescription was given to the Patient to bring to his PT place in [place].    POST OP CARE:instructions were reviewed including care of the wound and dressing after surgery and when he can shower.     PAIN MANAGEMENT: Shon Asencio was also given his pain management regime, which includes the TENS unit which he refused from  [facility]. He was also instructed regarding the Polar ice unit that will be in place after surgery and his postoperative pain medications.     PAIN MEDICATION:  Percocet 10/325mg 1 po q 4-6 hours prn pain  Ultram 50 mg one p.o. q.4-6 hours p.r.n. breakthrough pain,   Phenergan 25 mg one p.o. q.4-6 hours p.r.n. nausea and vomiting.    The patient will stop his blood thinner today and result start it after surgery to cover this DVT prophylaxis.     As there were no other questions to be asked, he was given my business card along with Aris Lopez MD business card if he has any questions or concerns prior to surgery or in the postop period.

## 2017-03-22 NOTE — BRIEF OP NOTE
BRIEF OPERATIVE NOTE:    Admit Date:   3/22/2017    D/C Date:  3/22/2017    Surgery date:  3/22/2017     Staff Surgeon:  John CABALLERO  Resident Surgeon: Sid CABALLERO    Disposition: Home or Self Care    Discharged Condition: good    Pre-op Diagnosis:    Active Hospital Problems    Diagnosis  POA    *Chronic pain of left knee [M25.562, G89.29]  Yes      Resolved Hospital Problems    Diagnosis Date Resolved POA   No resolved problems to display.     Post op diagnosis:  Same    Procedure:  Procedure(s) (LRB):  ARTHROSCOPY-KNEE (Left)  MENISCECTOMY (Left)    Anesthesia:  General LMA anesthesia    Description of procedure: knee arhtroscopy    Findings:   C/w pre op dx    Blood Loss:  Minimal    Specimens:  None    Implants:  none    Condition:  Good                 DISCHARGE NOTE:    Admit Date:   3/22/2017    D/C Date:  3/22/2017    Surgery date:  3/22/2017     Admitting provider: John CABALLERO    Discharging physician: John CABALLERO    Final Diagnosis:   Active Hospital Problems    Diagnosis  POA    *Chronic pain of left knee [M25.562, G89.29]  Yes      Resolved Hospital Problems    Diagnosis Date Resolved POA   No resolved problems to display.       Hospital Course: Pt admitted for outpatient surgery. Tolerated well. Uncomplicatedoutpatient surgery. Pt discharged after the procedure.    Condition:  Good    Diet:   Regular    Activity:  as tolerated    Follow up:  As scheduled in clinic 2 weeks    Medications:       Medication List      CONTINUE taking these medications          garlic 1 mg Cap       metoprolol succinate 25 MG 24 hr tablet   Commonly known as:  TOPROL-XL       omega 3-dha-epa-fish oil 1,200 (144-216) mg Cap       oxycodone-acetaminophen  mg per tablet   Commonly known as:  PERCOCET   Take 1 tablet by mouth every 4-6 hours as needed for pain. Take stool softener with this medication.       PRADAXA 75 mg Cap   Generic drug:  dabigatran etexilate       promethazine 25 MG tablet   Commonly  known as:  PHENERGAN   Take 1 tablet (25 mg total) by mouth every 6 (six) hours as needed for Nausea.       saw palmetto 80 MG capsule       tramadol 50 mg tablet   Commonly known as:  ULTRAM   Take 1-2 tablets ( mg total) by mouth every 6 (six) hours as needed for Pain.         STOP taking these medications          celecoxib 100 MG capsule   Commonly known as:  CeleBREX

## 2017-03-22 NOTE — ANESTHESIA POSTPROCEDURE EVALUATION
"Anesthesia Post Evaluation    Patient: Shon Asencio    Procedure(s) Performed: Procedure(s) (LRB):  ARTHROSCOPY-KNEE (Left)  MENISCECTOMY (Left)    Final Anesthesia Type: general  Patient location during evaluation: PACU  Patient participation: Yes- Able to Participate  Level of consciousness: awake and alert  Post-procedure vital signs: reviewed and stable  Pain management: adequate  Airway patency: patent  PONV status at discharge: No PONV  Anesthetic complications: no      Cardiovascular status: hemodynamically stable  Respiratory status: unassisted  Hydration status: euvolemic  Follow-up not needed.        Visit Vitals    /73    Pulse 92    Temp 36.4 °C (97.6 °F) (Oral)    Resp 18    Ht 6' 3" (1.905 m)    Wt (!) 154.2 kg (340 lb)    SpO2 98%    BMI 42.5 kg/m2       Pain/Pamella Score: Pain Assessment Performed: Yes (3/22/2017  9:50 AM)  Presence of Pain: complains of pain/discomfort (3/22/2017  9:50 AM)  Pain Rating Prior to Med Admin: 6 (3/22/2017  9:45 AM)  Pain Rating Post Med Admin: 3 (3/22/2017  9:50 AM)  Pamella Score: 10 (3/22/2017  9:50 AM)      "

## 2017-03-22 NOTE — TRANSFER OF CARE
"Anesthesia Transfer of Care Note    Patient: Shon Asencio    Procedure(s) Performed: Procedure(s) (LRB):  ARTHROSCOPY-KNEE (Left)  MENISCECTOMY (Left)    Patient location: PACU    Anesthesia Type: general    Transport from OR: Transported from OR on 2-3 L/min O2 by NC with adequate spontaneous ventilation    Post pain: adequate analgesia    Post assessment: no apparent anesthetic complications and tolerated procedure well    Post vital signs: stable    Level of consciousness: awake, alert and oriented    Nausea/Vomiting: no nausea/vomiting    Complications: none          Last vitals:   Visit Vitals    /64    Pulse 96    Temp 37 °C (98.6 °F) (Oral)    Resp 18    Ht 6' 3" (1.905 m)    Wt (!) 154.2 kg (340 lb)    SpO2 95%    BMI 42.5 kg/m2     "

## 2017-03-22 NOTE — PLAN OF CARE
Patient prefers to have Sherrie Asencio, spouse, and Chandler Rodriges, friend, present for discharge teaching. Please contact them @ [phone].

## 2017-03-22 NOTE — DISCHARGE INSTRUCTIONS
Discharge Instructions: Using Crutches (Weight-Bearing)  Your healthcare provider has prescribed crutches for you. A healthy leg can support your body weight, but when you have an injured leg or foot, you need to keep weight off it. Once you are told that you can put some weight on your leg, use a weight-bearing method of walking as the leg heals. Depending on your arm strength and balance, you can either step to or step through. Practice will help you learn to step through so that you can cover more ground with each step.          Before you use crutches  Be prepared with the following tips:  · Remove throw rugs, electrical cords, and anything else that may cause you to fall.  · Arrange your household to keep the items you need handy. Keep everything else out of the way.  · Find a backpack, ondina pack, or apron, or use pockets to carry things. This will help you keep your hands free.    Standing with crutches  Use the balanced standing (tripod) position when you start or end a movement. Also use it whenever you're standing for a length of time.  · Move your crutches in front of you about 12 inches.  · Find your balance.  · Be sure not to rest your armpits on the pads.    Walking with crutches  Follow these tips:  · Start in a balanced standing (tripod) position.  · Step forward with your affected foot.  · Land lightly between your crutches.  · Squeeze the pads against the sides of your chest.  · Support your weight with your hands and your affected leg.  · Press down on the handgrips.    Step to  This method includes the following:  · Lift your unaffected foot and step to the crutches.  · Land on your unaffected foot, between your crutches.  · Keep the knee slightly bent.  · Reach forward and out with the crutches to begin the next step.    Step through  This method includes the following:  · Lift the unaffected foot.  · Step forward through the crutches.  · Land on the unaffected foot, with the heel  slightly in front of the toe of the other foot.  · Keep the knee slightly bent.  · Reach forward and out with the crutches to begin the next step.    Follow-up  Make a follow-up appointment as directed by your healthcare provider.     When to call your healthcare provider  Call your healthcare provider right away if you have any of the following:    · Sudden or increased shortness of breath  · Sudden chest pain or localized chest pain with coughing  · Fever above 100.4°F (38.0°C)  · Increasing redness, tenderness, or swelling at theincision site or in the injured limb  · Drainage from the incision or injured limb  · Opening of the incision or injury  · Increasing pain, with or without activity     Discharge Instructions: After Your Surgery  Youve just had surgery. During surgery you were given medicine called anesthesia to keep you relaxed and free of pain. After surgery you may have some pain or nausea. This is common. Here are some tips for feeling better and getting well after surgery.     Stay on schedule with your medication.     Going home  Your doctor or nurse will show you how to take care of yourself when you go home. He or she will also answer your questions. Have an adult family member or friend drive you home. For the first 24 hours after your surgery:    · Do not drive or use heavy equipment.  · Do not make important decisions or sign legal papers.  · Do not drink alcohol.  · Have someone stay with you, if needed. He or she can watch for problems and help keep you safe.    Be sure to go to all follow-up visits with your doctor. And rest after your surgery for as long as your doctor tells you to.    Coping with pain  If you have pain after surgery, pain medicine will help you feel better. Take it as told, before pain becomes severe. Also, ask your doctor or pharmacist about other ways to control pain. This might be with heat, ice, or relaxation. And follow any other instructions your surgeon or nurse  gives you.    Tips for taking pain medicine  To get the best relief possible, remember these points:    · Pain medicines can upset your stomach. Taking them with a little food may help.  · Most pain relievers taken by mouth need at least 20 to 30 minutes to start to work.  · Taking medicine on a schedule can help you remember to take it. Try to time your medicine so that you can take it before starting an activity. This might be before you get dressed, go for a walk, or sit down for dinner.  · Constipation is a common side effect of pain medicines. Call your doctor before taking any medicines such as laxatives or stool softeners to help ease constipation. Also ask if you should skip any foods. Drinking lots of fluids and eating foods such as fruits and vegetables that are high in fiber can also help. Remember, do not take laxatives unless your surgeon has prescribed them.  · Drinking alcohol and taking pain medicine can cause dizziness and slow your breathing. It can even be deadly. Do not drink alcohol while taking pain medicine.  · Pain medicine can make you react more slowly to things. Do not drive or run machinery while taking pain medicine.    Your health care provider may tell you to take acetaminophen to help ease your pain. Ask him or her how much you are supposed to take each day. Acetaminophen or other pain relievers may interact with your prescription medicines or other over-the-counter (OTC) drugs. Some prescription medicines have acetaminophen and other ingredients. Using both prescription and OTC acetaminophen for pain can cause you to overdose. Read the labels on your OTC medicines with care. This will help you to clearly know the list of ingredients, how much to take, and any warnings. It may also help you not take too much acetaminophen. If you have questions or do not understand the information, ask your pharmacist or health care provider to explain it to you before you take the OTC  medicine.    Managing nausea  Some people have an upset stomach after surgery. This is often because of anesthesia, pain, or pain medicine, or the stress of surgery. These tips will help you handle nausea and eat healthy foods as you get better. If you were on a special food plan before surgery, ask your doctor if you should follow it while you get better. These tips may help:    · Do not push yourself to eat. Your body will tell you when to eat and how much.  · Start off with clear liquids and soup. They are easier to digest.  · Next try semi-solid foods, such as mashed potatoes, applesauce, and gelatin, as you feel ready.  · Slowly move to solid foods. Dont eat fatty, rich, or spicy foods at first.  · Do not force yourself to have 3 large meals a day. Instead eat smaller amounts more often.  · Take pain medicines with a small amount of solid food, such as crackers or toast, to avoid nausea.     Call your surgeon if  · You still have pain an hour after taking medicine. The medicine may not be strong enough.  · You feel too sleepy, dizzy, or groggy. The medicine may be too strong.  · You have side effects like nausea, vomiting, or skin changes, such as rash, itching, or hives.       If you have obstructive sleep apnea  You were given anesthesia medicine during surgery to keep you comfortable and free of pain. After surgery, you may have more apnea spells because of this medicine and other medicines you were given. The spells may last longer than usual.   At home:    · Keep using the continuous positive airway pressure (CPAP) device when you sleep. Unless your health care provider tells you not to, use it when you sleep, day or night. CPAP is a common device used to treat obstructive sleep apnea.  · Talk with your provider before taking any pain medicine, muscle relaxants, or sedatives. Your provider will tell you about the possible dangers of taking these medicines.    © 6942-9929 [employer] [address]  [place] [address]. All rights reserved. This information is not intended as a substitute for professional medical care. Always follow your healthcare professional's instructions.    PLEASE FOLLOW ANY OTHER INSTRUCTIONS PROVIDED TO YOU BY DR. RAY!             [facility] [facility] [address]                                                                                          [phone]                   Postoperative Instructions for Knee Surgery                 Your Surgery Included:   Open               Arthroscopic   [] Ligament Repair       [] Diagnostic           [] ACL     [] PCL     [] MCL     [] PLLC      [] Synovectomy / Plica Removal [] Meniscal Cartilage Repair / Transplantation      [] Lysis of Adhesions / Manipulation [] Articular Cartilage Repair      [] Interval Release           [] Microfracture       [] OATS   [] ACI      [x] Meniscectomy           [] Osteochondral Allograft      [] Meniscal Cartilage Repair  [] Patellar Realignment       [x] Debridement          [] Lateral Release   [] Ligament Repair      [] Articular Cartilage Repair          [] Extensor Mechanism             []   Microfracture  []  OATS         []  Cartilage Biopsy [] Tendon Repair          [] Ligament Reconstruction          [] Patella                  [] Quadriceps             []   ACL    []   PCL  [] High Tibial Osteotomy       [] PRP Arthrocentesis  [] Joint Replacement         [] Amniox Arthrocentesis           [] Unicompartmental   [] Patellofemoral                    [] Total Knee                  Call our office ([phone]) immediately if you experience any of the following:       Excessive bleeding or pus like drainage at the incision site       Uncontrollable pain not relieved by pain medication       Excessive swelling or redness at the incision site       Fever above 101.5 degrees not controlled with Tylenol or Motrin       Shortness of Breath       Any foul  odor or blistering from the surgery site    FOR EMERGENCIES: If any unusual problems or difficulties occur, call our office at [phone], or page the  at [phone] who will direct your call appropriately    1.   Pain Management: A cold therapy cuff, pain medications, local injections, and in some cases, regional anesthesia injections are used to manage your post-operative pain. The decision to use each of these options is based on their risks and benefits.     Medications: You were given one or more of the following medication prescriptions before leaving the hospital. Have the prescriptions filled at a pharmacy on your way home and follow the instructions on the bottles. If you need a refill, please call your pharmacy.      Narcotic Medication (usually Vicodin ES, Lortab, Percocet or Nucynta): Begin taking the medication before your knee starts to hurt. Some patients do not like to take any medication, but if you wait until your pain is severe before taking, you will be very uncomfortable for several hours waiting for the narcotic to work. Always take with food.     Nausea / Vomiting: For this issue, we prescribe Phenergan, use this medication as directed.     Cold Therapy: You may have been sent home with a [facility] cold therapy unit and wrap for your knee. Fill with ice and water to the indicated fill line and use throughout the day for the first two days and then as needed to help relieve pain and control swelling.      Regional Anesthesia Injections (Blocks): You may have been given a regional nerve block either before or after surgery. This may make your entire leg numb for 24-36 hours.                            * Proceed with caution when bearing weight on your leg.     2.   Diet: Eat a bland diet for the first day after surgery. Progress your diet as tolerated. Constipation may occur with Narcotic usage, contact our office if you are experiencing constipation.    3.   Activity: Limit your  activity during the first 48 hours, keep your leg elevated with pillows under your heel. After the first 48 hours at home, increase your activity level based on your symptoms.    4.   Dressing Change: Remove the dressing on the 3rd day. It is normal for some blood to be seen on the dressings. It is also normal for you to see apparent bruising on the skin around your knee when you remove the dressing. If present, leave the steri-strip tape across the incisions. If you are concerned by the drainage or the appearance of your knee, please call one of the numbers listed below.    5.   Showering: You may shower on the 10th day after surgery if the wound is dry and clean, but do not let the wound soak in water until sutures are removed. Do not submerge in any water until after your postoperative appointment in clinic.    6.   Your procedure did not require a post-operative brace.    7.   Your procedure did not require a Continuous Passive Motion (CPM) device.    8.   Weight Bearing: You may have been sent home with crutches. If instructed (see below), use these crutches at all times unless at complete rest.                  [x] Full weight bearing            [x]  NOW        9.  Knee Exercises: Begin these exercises the first day after surgery in order to help you regain your motion and strength. You may do the following marked exercises:     [x] Quad Sets - Begin activating your quadriceps muscle by driving your          knee downward into full knee extension while seated on a table or bed   with a towel rolled and propped under your heel     [x] Straight Leg Raise (SLR) - While justin your quadriceps muscle, lift     your fully extended leg to the level of your non-operative knee (as shown)     [x] Heel Slides - With the knee straight, slide your heel slowly toward your   buttocks, hold at the endpoint for 10-15 seconds, then slowly straighten     [x] Ankle pumps - With your knee straight, move your ankle in a  ""pumping"    fashion to activate your calf and leg muscles      10.  Physical Therapy: Physical therapy is an essential component to your recovery from surgery. Your physical therapy will start in 3 days.    FIRST POSTOPERATIVE VISIT: As scheduled.     Select on Hyperlink below to print updated instructions from [email or web address]  [email or web address]    Select on Hyperlink below to print updated instructions from [email or web address]  [employer] Breg Brace Instructions        Discharge Instructions: After Your Surgery  Youve just had surgery. During surgery you were given medicine called anesthesia to keep you relaxed and free of pain. After surgery you may have some pain or nausea. This is common. Here are some tips for feeling better and getting well after surgery.     Stay on schedule with your medication.   Going home  Your doctor or nurse will show you how to take care of yourself when you go home. He or she will also answer your questions. Have an adult family member or friend drive you home. For the first 24 hours after your surgery:  · Do not drive or use heavy equipment.  · Do not make important decisions or sign legal papers.  · Do not drink alcohol.  · Have someone stay with you, if needed. He or she can watch for problems and help keep you safe.  Be sure to go to all follow-up visits with your doctor. And rest after your surgery for as long as your doctor tells you to.  Coping with pain  If you have pain after surgery, pain medicine will help you feel better. Take it as told, before pain becomes severe. Also, ask your doctor or pharmacist about other ways to control pain. This might be with heat, ice, or relaxation. And follow any other instructions your surgeon or nurse gives you.  Tips for taking pain medicine  To get the best relief possible, remember these points:  · Pain medicines can upset your stomach. Taking them with a little food may help.  · Most pain relievers taken by mouth need at least 20 to 30 minutes to start to " work.  · Taking medicine on a schedule can help you remember to take it. Try to time your medicine so that you can take it before starting an activity. This might be before you get dressed, go for a walk, or sit down for dinner.  · Constipation is a common side effect of pain medicines. Call your doctor before taking any medicines such as laxatives or stool softeners to help ease constipation. Also ask if you should skip any foods. Drinking lots of fluids and eating foods such as fruits and vegetables that are high in fiber can also help. Remember, do not take laxatives unless your surgeon has prescribed them.  · Drinking alcohol and taking pain medicine can cause dizziness and slow your breathing. It can even be deadly. Do not drink alcohol while taking pain medicine.  · Pain medicine can make you react more slowly to things. Do not drive or run machinery while taking pain medicine.  Your health care provider may tell you to take acetaminophen to help ease your pain. Ask him or her how much you are supposed to take each day. Acetaminophen or other pain relievers may interact with your prescription medicines or other over-the-counter (OTC) drugs. Some prescription medicines have acetaminophen and other ingredients. Using both prescription and OTC acetaminophen for pain can cause you to overdose. Read the labels on your OTC medicines with care. This will help you to clearly know the list of ingredients, how much to take, and any warnings. It may also help you not take too much acetaminophen. If you have questions or do not understand the information, ask your pharmacist or health care provider to explain it to you before you take the OTC medicine.  Managing nausea  Some people have an upset stomach after surgery. This is often because of anesthesia, pain, or pain medicine, or the stress of surgery. These tips will help you handle nausea and eat healthy foods as you get better. If you were on a special food plan  before surgery, ask your doctor if you should follow it while you get better. These tips may help:  · Do not push yourself to eat. Your body will tell you when to eat and how much.  · Start off with clear liquids and soup. They are easier to digest.  · Next try semi-solid foods, such as mashed potatoes, applesauce, and gelatin, as you feel ready.  · Slowly move to solid foods. Dont eat fatty, rich, or spicy foods at first.  · Do not force yourself to have 3 large meals a day. Instead eat smaller amounts more often.  · Take pain medicines with a small amount of solid food, such as crackers or toast, to avoid nausea.     Call your surgeon if  · You still have pain an hour after taking medicine. The medicine may not be strong enough.  · You feel too sleepy, dizzy, or groggy. The medicine may be too strong.  · You have side effects like nausea, vomiting, or skin changes, such as rash, itching, or hives.       If you have obstructive sleep apnea  You were given anesthesia medicine during surgery to keep you comfortable and free of pain. After surgery, you may have more apnea spells because of this medicine and other medicines you were given. The spells may last longer than usual.   At home:  · Keep using the continuous positive airway pressure (CPAP) device when you sleep. Unless your health care provider tells you not to, use it when you sleep, day or night. CPAP is a common device used to treat obstructive sleep apnea.  · Talk with your provider before taking any pain medicine, muscle relaxants, or sedatives. Your provider will tell you about the possible dangers of taking these medicines.  Date Last Reviewed: 10/16/2014  © 1081-2340 [employer]. [address] [place] [address], [place] [address]. All rights reserved. This information is not intended as a substitute for professional medical care. Always follow your healthcare professional's instructions.

## 2017-03-22 NOTE — OP NOTE
DATE OF PROCEDURE: 3/22/2017    PREOPERATIVE DIAGNOSES:   1. Left knee medial meniscus tear.     POSTOPERATIVE DIAGNOSES:   1. Left knee medial meniscus tear.   2. Left knee lateral meniscus tear    PROCEDURES:   1. Left knee arthroscopic partial medial and lateral meniscectomies      SURGEON: Aris Lopez M.D.     ASSISTANTS:   1. Jerry Lau MD   2. Jaci Siegel    COMPLICATIONS: None.     POSITION: Supine with arthroscopic leg galan.     ANESTHESIA: General endotracheal plus local.     COMPLICATIONS: None.     TOURNIQUET TIME:  None    EBL:  Minimal    EXAMINATION UNDER ANESTHESIA:   Knee: full range of motion, no evidence of instability.     ARTHROSCOPIC FINDINGS:   1. Complex tear posterior horn / body medial meniscus.   2. Free edge tear, lateral meniscus    INDICATIONS: The patient is a 66 y.o.-year-old male with a history of left knee pain. MRI confirms a tear in his medial meniscus.  After the risks and benefits of surgery were discussed with the patient, including bleeding, infection, scarring, persistent pain, risk of blood clots (DVT), pulmonary embolism, compartment syndrome, damage to cartilage, damage to neurovascular structures, plus the risks of anesthesia, including, death, stroke, and heart attack, the patient wished to proceed with operative intervention.      DESCRIPTION OF PROCEDURE:     The patient and correct limb were identified and marked in the pre-op holding area.     The patient was brought in the room. After undergoing general endotracheal anesthesia,  he was placed on a well-padded operating table. his left lower extremity was prepped and draped in usual sterile fashion. A nonsterile tourniquet was placed high up around the thigh. A well padded arthroscopic leg galan was used during the case. The contralateral leg was placed in a well padded Uzair stirrup with bony prominences all being well padded.      After infiltrating the joint and portal sites with a total of 30 cc of  0.25% marcaine, the standard inferolateral and inferomedial portals were created. Diagnostic arthroscopy performed.     The patellofemoral joint was entered. There was no significant chondromalacia on the trochlea or on the undersurface of the patella.    There was a mild synovitis noted within the anterior interval. An VAPR device was used to remove areas of irritating synovium from the overlying trochlea in the anterior interval to allow for visualization to minimize abrasion.    Attention turned to the medial compartment. Medial femoral condyle was intact. There was a complex tear in the body and posterior horn of the medial meniscus, Using combination of straight and curved biters and arthroscopic xenia, the unstable portion of the medial meniscus was trimmed down to a stable rim. Approximately 15% of the body of the medial meniscus was resected down to get this to a stable position.     Attention was turned to the intercondylar notch. The ACL and PCL were intact.     Attention was turned to the lateral compartment. The lateral meniscus had a free edge tear along the central aspect.  Using a combination of straight and curved biters and xenia, the unstable central 10-15% of the lateral meniscus was trimmed down to a stable rim.  The femoral condyle and lateral tibial plateau were intact.      Portal sites were closed with 4-0 Monocryl, covered with Mastisol, Steri-Strips, Xeroform, 4 x 4 fluffs, ABD pads and thigh-high DIAN hose.    The patient was extubated in the room, transferred to Recovery Room in stable condition accompanied by his physician.  There were no complications in the case.     I was present, scrubbed and did perform all critical portions of the case.      JHON RAY MD

## 2017-03-28 ENCOUNTER — TELEPHONE (OUTPATIENT)
Dept: ORTHOPEDICS | Facility: CLINIC | Age: 67
End: 2017-03-28

## 2017-03-28 DIAGNOSIS — M54.50 LUMBAR SPINE PAIN: Primary | ICD-10-CM

## 2017-04-05 ENCOUNTER — HOSPITAL ENCOUNTER (OUTPATIENT)
Dept: RADIOLOGY | Facility: HOSPITAL | Age: 67
Discharge: HOME | End: 2017-04-05
Attending: ORTHOPAEDIC SURGERY
Payer: MEDICARE

## 2017-04-05 ENCOUNTER — OFFICE VISIT (OUTPATIENT)
Dept: SPORTS MEDICINE | Facility: CLINIC | Age: 67
End: 2017-04-05
Payer: MEDICARE

## 2017-04-05 ENCOUNTER — OFFICE VISIT (OUTPATIENT)
Dept: ORTHOPEDICS | Facility: CLINIC | Age: 67
End: 2017-04-05
Payer: MEDICARE

## 2017-04-05 VITALS
SYSTOLIC BLOOD PRESSURE: 120 MMHG | HEIGHT: 75 IN | BODY MASS INDEX: 39.17 KG/M2 | DIASTOLIC BLOOD PRESSURE: 75 MMHG | WEIGHT: 315 LBS | HEART RATE: 90 BPM

## 2017-04-05 VITALS
DIASTOLIC BLOOD PRESSURE: 80 MMHG | BODY MASS INDEX: 39.17 KG/M2 | WEIGHT: 315 LBS | HEIGHT: 75 IN | HEART RATE: 102 BPM | SYSTOLIC BLOOD PRESSURE: 117 MMHG

## 2017-04-05 DIAGNOSIS — Z98.890 S/P ARTHROSCOPY OF LEFT KNEE: Primary | ICD-10-CM

## 2017-04-05 DIAGNOSIS — M54.50 LUMBAR SPINE PAIN: ICD-10-CM

## 2017-04-05 DIAGNOSIS — M54.16 LUMBAR RADICULOPATHY: Primary | ICD-10-CM

## 2017-04-05 PROCEDURE — 99214 OFFICE O/P EST MOD 30 MIN: CPT | Mod: S$PBB,24,, | Performed by: PHYSICIAN ASSISTANT

## 2017-04-05 PROCEDURE — 99024 POSTOP FOLLOW-UP VISIT: CPT | Mod: ,,, | Performed by: PHYSICIAN ASSISTANT

## 2017-04-05 PROCEDURE — 99213 OFFICE O/P EST LOW 20 MIN: CPT | Mod: PBBFAC,27 | Performed by: PHYSICIAN ASSISTANT

## 2017-04-05 PROCEDURE — 99999 PR PBB SHADOW E&M-EST. PATIENT-LVL III: CPT | Mod: PBBFAC,,, | Performed by: PHYSICIAN ASSISTANT

## 2017-04-05 PROCEDURE — 72120 X-RAY BEND ONLY L-S SPINE: CPT | Mod: 26,,, | Performed by: RADIOLOGY

## 2017-04-05 PROCEDURE — 72100 X-RAY EXAM L-S SPINE 2/3 VWS: CPT | Mod: 26,,, | Performed by: RADIOLOGY

## 2017-04-05 RX ORDER — DABIGATRAN ETEXILATE MESYLATE 150 MG/1
CAPSULE ORAL
COMMUNITY
Start: 2017-03-03 | End: 2017-10-19

## 2017-04-05 NOTE — MR AVS SNAPSHOT
[facility]  [address]  [facility] [address]  Phone: [phone]                  Shon Asencio   2017 8:30 AM   Appointment    Description:  Male : 1950   Provider:  Chay Haney III, PA-C   Department:  [facility]                To Do List           Future Appointments        Provider Department Dept Phone    2017 1:30 PM [facility] [id number] [address] LIMIT [facility] [phone]    2017 2:15 PM Jonathan Patterson MD [facility] [phone]    2017 10:00 AM Aris Lopez MD [facility] [phone]      Goals (5 Years of Data)     None      [facility]     [facility] Nurse Care Line -  Assistance  Unless otherwise directed by your provider, please contact [facility], our nurse care line that is available for  assistance.     Registered nurses in the [facility] provide: appointment scheduling, clinical advisement, health education, and other advisory services.  Call: [phone] (toll free)               Medications           Message regarding Medications     Verify the changes and/or additions to your medication regime listed below are the same as discussed with your clinician today.  If any of these changes or additions are incorrect, please notify your healthcare provider.             Verify that the below list of medications is an accurate representation of the medications you are currently taking.  If none reported, the list may be blank. If incorrect, please contact your healthcare provider. Carry this list with you in case of emergency.           Current Medications     dabigatran etexilate (PRADAXA) 75 mg Cap Take by mouth.    garlic 1 mg Cap Take 1 tablet by mouth once daily.     metoprolol succinate (TOPROL-XL) 25 MG 24 hr tablet Take 25 mg by mouth once daily. Pt. Takes 25 mg in morning and 12.5 in the evening.    omega 3-dha-epa-fish oil 1,200  (144-216) mg Cap Take by mouth.    oxycodone-acetaminophen (PERCOCET)  mg per tablet Take 1 tablet by mouth every 4-6 hours as needed for pain. Take stool softener with this medication.    promethazine (PHENERGAN) 25 MG tablet Take 1 tablet (25 mg total) by mouth every 6 (six) hours as needed for Nausea.    saw palmetto 80 MG capsule Take 80 mg by mouth once daily. Takes 2 tabs daily    tramadol (ULTRAM) 50 mg tablet Take 1-2 tablets ( mg total) by mouth every 6 (six) hours as needed for Pain.           Clinical Reference Information           Allergies as of 4/5/2017     No Known Drug Allergies      Immunizations Administered on Date of Encounter - 4/5/2017     None      [facility] Sign-Up     Activating your [facility] account is as easy as 1-2-3!     1) Visit [email or web address], select Sign Up Now, enter this activation code and your date of birth, then select Next.  [email or web address]  Expires: 4/21/2017 11:33 AM      2) Create a username and password to use when you visit [facility] in the future and select a security question in case you lose your password and select Next.    3) Enter your e-mail address and click Sign Up!    Additional Information  If you have questions, please e-mail [email or web address] or call [phone] to talk to our [facility] staff. Remember, [facility] is NOT to be used for urgent needs. For medical emergencies, dial 911.         Language Assistance Services     ATTENTION: Language assistance services are available, free of charge. Please call [phone].      ATENCIÓN: Si habla español, tiene a deleon disposición servicios gratuitos de asistencia lingüística. Llame al [phone].     ERICKA Ý: N?u b?n nói Ti?ng Vi?t, có các d?ch v? h? tr? ngôn ng? mi?n phí dành cho b?n. G?i s? [phone].         [facility] complies with applicable Federal civil rights laws and does not discriminate on the basis of race, color, national origin, age, disability, or sex.

## 2017-04-05 NOTE — PROGRESS NOTES
DATE: 4/5/2017  PATIENT: Shon Asencio    Supervising Physician: Jonathan Patterson M.D.    CHIEF COMPLAINT: back and right leg pain    HISTORY:  Shon Asencio is a 66 y.o. male here for initial evaluation of low back and right posterior leg pain (Back - 3, Leg - 8).  The pain in the leg is what bothers him most.  The pain has been present for 10 years. The patient describes the pain as aching.  The pain is worse with sitting and standing and improved by leaning forward over a shopping cart. There is no associated numbness and tingling. There is no subjective weakness. Prior treatments have included celebrex, but no physical therapy, ESIs or surgery.    The patient denies myelopathic symptoms such as handwriting changes or difficulty with buttons/coins/keys. Denies perineal paresthesias, bowel/bladder dysfunction.    PAST MEDICAL/SURGICAL HISTORY:  Past Medical History:   Diagnosis Date    Atrial fibrillation     Basal cell carcinoma     Personal history of PE (pulmonary embolism) 2001     Past Surgical History:   Procedure Laterality Date    NASAL SEPTUM SURGERY Left 2012       Medications:   Current Outpatient Prescriptions on File Prior to Visit   Medication Sig Dispense Refill    dabigatran etexilate (PRADAXA) 75 mg Cap Take by mouth.      garlic 1 mg Cap Take 1 tablet by mouth once daily.       metoprolol succinate (TOPROL-XL) 25 MG 24 hr tablet Take 25 mg by mouth once daily. Pt. Takes 25 mg in morning and 12.5 in the evening.      omega 3-dha-epa-fish oil 1,200 (144-216) mg Cap Take by mouth.      promethazine (PHENERGAN) 25 MG tablet Take 1 tablet (25 mg total) by mouth every 6 (six) hours as needed for Nausea. 30 tablet 0    saw palmetto 80 MG capsule Take 80 mg by mouth once daily. Takes 2 tabs daily      oxycodone-acetaminophen (PERCOCET)  mg per tablet Take 1 tablet by mouth every 4-6 hours as needed for pain. Take stool softener with this medication. 60 tablet 0    tramadol  "(ULTRAM) 50 mg tablet Take 1-2 tablets ( mg total) by mouth every 6 (six) hours as needed for Pain. 60 tablet 0     No current facility-administered medications on file prior to visit.        Social History:   Social History     Social History    Marital status:      Spouse name: N/A    Number of children: N/A    Years of education: N/A     Occupational History    Not on file.     Social History Main Topics    Smoking status: Former Smoker     Packs/day: 0.25     Years: 37.00     Types: Cigarettes     Quit date: 2002    Smokeless tobacco: Not on file      Comment: smokes 2-3 cigarettes, 2-3 times a month    Alcohol use 0.6 oz/week     1 Shots of liquor per week      Comment: occasionally    Drug use: No    Sexual activity: Not on file     Other Topics Concern    Not on file     Social History Narrative       REVIEW OF SYSTEMS:  Constitution: Negative. Negative for chills, fever and night sweats.   Cardiovascular: Negative for chest pain and syncope.   Respiratory: Negative for cough and shortness of breath.   Gastrointestinal: See HPI. Negative for nausea/vomiting. Negative for abdominal pain.  Genitourinary: See HPI. Negative for discoloration or dysuria.  Skin: Negative for dry skin, itching and rash.   Hematologic/Lymphatic: Negative for bleeding problem. Does not bruise/bleed easily.   Musculoskeletal: Negative for falls and muscle weakness.   Neurological: See HPI. No seizures.   Endocrine: Negative for polydipsia, polyphagia and polyuria.   Allergic/Immunologic: Negative for hives and persistent infections.     EXAM:  /75 (BP Location: Right arm, Patient Position: Sitting, BP Method: Automatic)  Pulse 90  Ht 6' 3" (1.905 m)  Wt (!) 162.5 kg (358 lb 4 oz)  BMI 44.78 kg/m2    General: The patient is a very pleasant 66 y.o. male in no apparent distress, the patient is oriented to person, place and time.  Psych: Normal mood and affect  HEENT: Vision grossly intact, hearing intact " to the spoken word.  Lungs: Respirations unlabored.  Gait: Normal station and gait, no difficulty with toe or heel walk.   Skin: Dorsal lumbar skin negative for rashes, lesions, hairy patches and surgical scars. There is mild lumbar tenderness to palpation.  Range of motion: Lumbar range of motion is acceptable.  Spinal Balance: Global saggital and coronal spinal balance acceptable, not significant for scoliosis and kyphosis.  Musculoskeletal: No pain with the range of motion of the bilateral hips. No trochanteric tenderness to palpation.  Vascular: Bilateral lower extremities warm and well perfused, dorsalis pedis pulses 2+ bilaterally.  Neurological: Normal strength and tone in all major motor groups in the bilateral lower extremities. Normal sensation to light touch in the L2-S1 dermatomes bilaterally.  Deep tendon reflexes symmetric 1+ in the bilateral lower extremities.  Negative Babinski bilaterally. Straight leg raise negative bilaterally.    IMAGING:      Today I personally reviewed AP, Lat and Flex/Ex  upright L-spine films that demonstrate mild degenerative changes of the lumbar spine.  Trace grade I anterolisthesis of L4/5.      ASSESSMENT/PLAN:    Diagnoses and all orders for this visit:    Lumbar radiculopathy  -     MRI Lumbar Spine Without Contrast; Future    MRI lumbar spine for further evaluation.  Follow up after the MRI to discuss results and further treatment including ESIs and possibly surgery.      Return if symptoms worsen or fail to improve.

## 2017-04-05 NOTE — PROGRESS NOTES
HISTORY OF PRESENT ILLNESS:   Pt is here today for first post-operative followup of knee arthroscopy.  He is doing well.  We have reviewed her findings and discussed plan of care and future treatment options.      DATE OF PROCEDURE: 3/22/2017     PREOPERATIVE DIAGNOSES:   1. Left knee medial meniscus tear.      POSTOPERATIVE DIAGNOSES:   1. Left knee medial meniscus tear.   2. Left knee lateral meniscus tear     PROCEDURES:   1. Left knee arthroscopic partial medial and lateral meniscectomies     SURGEON: Aris Lopez M.D.                                                                                   PHYSICAL EXAMINATION:     Incision sites healed well  No evidence of any erythema, infection or induration  Range of motion 0-135 degrees  Minimal effusion  2+ DP pulse  No swelling, no calf tenderness  - Ajay's sign  Negative medial joint line tendernes  Moderate quad atrophy                                                                                 ASSESSMENT:                                                                                                                                               1. Status post above, doing well.                                                                                                                               PLAN:                                                                                                                                                     1. Continue with PT. He is back on pradaxa.  2. Emphasized quad function.  3. I have discussed return to activity in detail.  4.Patient will see us back at 6 week post-op victor m.                                    5. All questions were answered and patient should contact us if he  has any questions or concerns in the interim.

## 2017-04-12 ENCOUNTER — HOSPITAL ENCOUNTER (OUTPATIENT)
Dept: RADIOLOGY | Facility: HOSPITAL | Age: 67
Discharge: HOME | End: 2017-04-12
Attending: ORTHOPAEDIC SURGERY
Payer: MEDICARE

## 2017-04-12 DIAGNOSIS — M54.16 LUMBAR RADICULOPATHY: ICD-10-CM

## 2017-04-12 PROCEDURE — 72148 MRI LUMBAR SPINE W/O DYE: CPT | Mod: 26,,, | Performed by: RADIOLOGY

## 2017-04-12 PROCEDURE — 72148 MRI LUMBAR SPINE W/O DYE: CPT | Mod: TC

## 2017-04-17 ENCOUNTER — OFFICE VISIT (OUTPATIENT)
Dept: ORTHOPEDICS | Facility: CLINIC | Age: 67
End: 2017-04-17
Payer: MEDICARE

## 2017-04-17 VITALS — WEIGHT: 315 LBS | BODY MASS INDEX: 39.17 KG/M2 | HEIGHT: 75 IN

## 2017-04-17 DIAGNOSIS — M54.16 LUMBAR RADICULOPATHY: Primary | ICD-10-CM

## 2017-04-17 PROCEDURE — 99213 OFFICE O/P EST LOW 20 MIN: CPT | Mod: PBBFAC | Performed by: PHYSICIAN ASSISTANT

## 2017-04-17 PROCEDURE — 99213 OFFICE O/P EST LOW 20 MIN: CPT | Mod: S$PBB,24,, | Performed by: PHYSICIAN ASSISTANT

## 2017-04-17 PROCEDURE — 99999 PR PBB SHADOW E&M-EST. PATIENT-LVL III: CPT | Mod: PBBFAC,,, | Performed by: PHYSICIAN ASSISTANT

## 2017-04-17 NOTE — PROGRESS NOTES
"DATE: 4/17/2017  PATIENT: Shon Asencio    Attending Physician: Jonathan Patterson M.D.    HISTORY:  Shon Asencio is a 66 y.o. male who returns to me today for MRI results.  He was last seen by me 4/5/2017.  Today he is doing well but notes his back and right leg are bothering him a lot because he drove to [place] yesterday.    The Patient denies myelopathic symptoms such as handwriting changes or difficulty with buttons/coins/keys. Denies perineal paresthesias, bowel/bladder dysfunction.    PMH/PSH/FamHx/SocHx:  Unchanged from prior visit    ROS:  REVIEW OF SYSTEMS:  Constitution: Negative. Negative for chills, fever and night sweats.   HENT: Negative for congestion and headaches.    Eyes: Negative for blurred vision, left vision loss and right vision loss.   Cardiovascular: Negative for chest pain and syncope.   Respiratory: Negative for cough and shortness of breath.    Endocrine: Negative for polydipsia, polyphagia and polyuria.   Hematologic/Lymphatic: Negative for bleeding problem. Does not bruise/bleed easily.   Skin: Negative for dry skin, itching and rash.   Musculoskeletal: Negative for falls and muscle weakness.   Gastrointestinal: Negative for abdominal pain and bowel incontinence.   Allergic/Immunologic: Negative for hives and persistent infections.  Genitourinary: Negative for urinary retention/incontinence and nocturia.   Neurological: Negative for disturbances in coordination, no myelopathic symptoms such as handwriting changes or difficulty with buttons, coins, keys or small objects. No loss of balance and seizures.   Psychiatric/Behavioral: Negative for depression. The patient does not have insomnia.   Denies perineal paresthesias, bowel or bladder incontinence    EXAM:  Ht 6' 3" (1.905 m)  Wt (!) 162.7 kg (358 lb 11 oz)  BMI 44.83 kg/m2    General: The patient is a very pleasant 66 y.o. male in no apparent distress, the patient is oriented to person, place and time.  Psych: " Normal mood and affect  HEENT: Vision grossly intact, hearing intact to the spoken word.  Lungs: Respirations unlabored.  Gait: Normal station and gait, no difficulty with toe or heel walk.   Skin: Dorsal lumbar skin negative for rashes, lesions, hairy patches and surgical scars. There is mild lumbar tenderness to palpation.  Range of motion: Lumbar range of motion is acceptable.  Spinal Balance: Global saggital and coronal spinal balance acceptable, not significant for scoliosis and kyphosis.  Musculoskeletal: No pain with the range of motion of the bilateral hips. No trochanteric tenderness to palpation.  Vascular: Bilateral lower extremities warm and well perfused, dorsalis pedis pulses 2+ bilaterally.  Neurological: Normal strength and tone in all major motor groups in the bilateral lower extremities. Normal sensation to light touch in the L2-S1 dermatomes bilaterally.  Deep tendon reflexes symmetric 1+ in the bilateral lower extremities.  Negative Babinski bilaterally. Straight leg raise negative bilaterally.      IMAGING:  No new imaging today.    Today I personally re- reviewed AP, Lat and Flex/Ex  upright L-spine that demonstrate mild degenerative changes of the lumbar spine. Trace grade I anterolisthesis of L4/5.      MRI lumbar spine demonstrates multilevel degenerative changes with facet arthropathy at L4/5.     ASSESSMENT/PLAN:    Diagnoses and all orders for this visit:    Lumbar radiculopathy      The patient is still in PT for his knee and does not want to start PT for his back until he is finished with his knee.  He will continue tramadol as needed as it provides good relief.  Follow up as needed.     Return if symptoms worsen or fail to improve.

## 2017-05-15 ENCOUNTER — TELEPHONE (OUTPATIENT)
Dept: SPORTS MEDICINE | Facility: CLINIC | Age: 67
End: 2017-05-15

## 2017-05-15 NOTE — TELEPHONE ENCOUNTER
Spoke to patient. He is going to [place] for a trip. He will call us when he returns to be worked into Dr. Lopez's schedule.

## 2017-05-15 NOTE — TELEPHONE ENCOUNTER
----- Message from Nasra Lopez sent at 5/15/2017  8:58 AM CDT -----  Contact: self   Pt called to cancel to his post op appt. Pt can be contacted at [phone]

## 2017-09-19 ENCOUNTER — TELEPHONE (OUTPATIENT)
Dept: SPORTS MEDICINE | Facility: CLINIC | Age: 67
End: 2017-09-19

## 2017-09-19 NOTE — TELEPHONE ENCOUNTER
Spoke to patient. Patient asked for a refill on tramadol he was informed that that particle medication is prescribed during p/o period. Patient also schedule a f/u appointment with Dr. Lopez.

## 2017-09-19 NOTE — TELEPHONE ENCOUNTER
----- Message from Nasra Lopez sent at 9/19/2017 11:51 AM CDT -----  Contact: self   Pt is requesting a call back to discuss some concerns he is having regarding his future appts. [phone]

## 2017-09-28 ENCOUNTER — OFFICE VISIT (OUTPATIENT)
Dept: SPORTS MEDICINE | Facility: CLINIC | Age: 67
End: 2017-09-28
Payer: MEDICARE

## 2017-09-28 VITALS — BODY MASS INDEX: 39.17 KG/M2 | HEIGHT: 75 IN | WEIGHT: 315 LBS

## 2017-09-28 DIAGNOSIS — M25.562 LEFT KNEE PAIN, UNSPECIFIED CHRONICITY: Primary | ICD-10-CM

## 2017-09-28 PROCEDURE — 99214 OFFICE O/P EST MOD 30 MIN: CPT | Mod: 25,S$PBB,, | Performed by: ORTHOPAEDIC SURGERY

## 2017-09-28 PROCEDURE — 20610 DRAIN/INJ JOINT/BURSA W/O US: CPT | Mod: PBBFAC,PO | Performed by: ORTHOPAEDIC SURGERY

## 2017-09-28 PROCEDURE — 99999 PR PBB SHADOW E&M-EST. PATIENT-LVL II: CPT | Mod: PBBFAC,,, | Performed by: ORTHOPAEDIC SURGERY

## 2017-09-28 PROCEDURE — 1159F MED LIST DOCD IN RCRD: CPT | Mod: ,,, | Performed by: ORTHOPAEDIC SURGERY

## 2017-09-28 PROCEDURE — 1125F AMNT PAIN NOTED PAIN PRSNT: CPT | Mod: ,,, | Performed by: ORTHOPAEDIC SURGERY

## 2017-09-28 PROCEDURE — 99212 OFFICE O/P EST SF 10 MIN: CPT | Mod: PBBFAC,PO,25 | Performed by: ORTHOPAEDIC SURGERY

## 2017-09-28 RX ORDER — TRAMADOL HYDROCHLORIDE 50 MG/1
50 TABLET ORAL EVERY 4 HOURS PRN
Qty: 60 TABLET | Refills: 0 | Status: SHIPPED | OUTPATIENT
Start: 2017-09-28 | End: 2017-10-08

## 2017-09-28 RX ORDER — CELECOXIB 100 MG/1
100 CAPSULE ORAL DAILY
COMMUNITY
End: 2019-12-10 | Stop reason: SDUPTHER

## 2017-09-28 RX ORDER — TRIAMCINOLONE ACETONIDE 40 MG/ML
40 INJECTION, SUSPENSION INTRA-ARTICULAR; INTRAMUSCULAR
Status: DISCONTINUED | OUTPATIENT
Start: 2017-09-28 | End: 2017-09-28 | Stop reason: HOSPADM

## 2017-09-28 RX ADMIN — TRIAMCINOLONE ACETONIDE 40 MG: 40 INJECTION, SUSPENSION INTRA-ARTICULAR; INTRAMUSCULAR at 04:09

## 2017-09-28 NOTE — PROGRESS NOTES
HISTORY OF PRESENT ILLNESS:   Pt is here today for first post-operative followup of knee arthroscopy.  He was doing great until 2 weeks ago when he had increasing pain with swelling and locking of his left knee. The pain is sharp in nature, increased with any activity and improved with immobilization. He does not remember any traumatic event. But it is very painful and affecting his ADLs and hobbies.     DATE OF PROCEDURE: 3/22/2017     PREOPERATIVE DIAGNOSES:   1. Left knee medial meniscus tear.      POSTOPERATIVE DIAGNOSES:   1. Left knee medial meniscus tear.   2. Left knee lateral meniscus tear     PROCEDURES:   1. Left knee arthroscopic partial medial and lateral meniscectomies     SURGEON: Aris Lopez M.D.      Current Outpatient Prescriptions on File Prior to Visit   Medication Sig Dispense Refill    dabigatran etexilate (PRADAXA) 75 mg Cap Take by mouth.      garlic 1 mg Cap Take 1 tablet by mouth once daily.       metoprolol succinate (TOPROL-XL) 25 MG 24 hr tablet Take 25 mg by mouth once daily. Pt. Takes 25 mg in morning and 12.5 in the evening.      omega 3-dha-epa-fish oil 1,200 (144-216) mg Cap Take by mouth.      saw palmetto 80 MG capsule Take 80 mg by mouth once daily. Takes 2 tabs daily      oxycodone-acetaminophen (PERCOCET)  mg per tablet Take 1 tablet by mouth every 4-6 hours as needed for pain. Take stool softener with this medication. 60 tablet 0    PRADAXA 150 mg Cap       promethazine (PHENERGAN) 25 MG tablet Take 1 tablet (25 mg total) by mouth every 6 (six) hours as needed for Nausea. 30 tablet 0    tramadol (ULTRAM) 50 mg tablet Take 1-2 tablets ( mg total) by mouth every 6 (six) hours as needed for Pain. 60 tablet 0     No current facility-administered medications on file prior to visit.      Review of patient's allergies indicates:   Allergen Reactions    No known drug allergies      Past Medical History:   Diagnosis Date    Atrial fibrillation     Basal  cell carcinoma     Personal history of PE (pulmonary embolism) 2001     No family history on file.  Past Surgical History:   Procedure Laterality Date    KNEE ARTHROSCOPY      NASAL SEPTUM SURGERY Left 2012     Social History     Social History    Marital status:      Spouse name: N/A    Number of children: N/A    Years of education: N/A     Occupational History    Not on file.     Social History Main Topics    Smoking status: Former Smoker     Packs/day: 0.25     Years: 37.00     Types: Cigarettes     Quit date: 2002    Smokeless tobacco: Never Used      Comment: smokes 2-3 cigarettes, 2-3 times a month    Alcohol use 0.6 oz/week     1 Shots of liquor per week      Comment: occasionally    Drug use: No    Sexual activity: Not on file     Other Topics Concern    Not on file     Social History Narrative    No narrative on file     Review of Systems   Constitution: Negative. Negative for chills, fever and night sweats.   HENT: Negative for congestion and headaches.    Eyes: Negative for blurred vision, left vision loss and right vision loss.   Cardiovascular: Negative for chest pain and syncope.   Respiratory: Negative for cough and shortness of breath.    Endocrine: Negative for polydipsia, polyphagia and polyuria.   Hematologic/Lymphatic: Negative for bleeding problem. Does not bruise/bleed easily.   Skin: Negative for dry skin, itching and rash.   Musculoskeletal: Negative for falls and muscle weakness. Positive for above  Gastrointestinal: Negative for abdominal pain and bowel incontinence.   Genitourinary: Negative for bladder incontinence and nocturia.   Neurological: Negative for disturbances in coordination, loss of balance and seizures.   Psychiatric/Behavioral: Negative for depression. The patient does not have insomnia.    Allergic/Immunologic: Negative for hives and persistent infections.                                                                                  PHYSICAL  EXAMINATION:     Incision sites healed well  Range of motion 0-135 degrees  Moderate effusion  2+ DP pulse  No swelling, no calf tenderness  - Ajay's sign  Negative medial joint line tendernes  Moderate quad atrophy                                                                                 ASSESSMENT:                                                                                                                                               1. Status post above with pain in the left knee with probably meniscus tear                                                                                                           PLAN:                                                                                                                                                     1. Injection today and tramadol. If this does not help, we will need an MRI to check for meniscal etiology and to plan for possible knee arthroscopy.

## 2017-09-28 NOTE — PROCEDURES
Large Joint Aspiration/Injection  Date/Time: 9/28/2017 4:42 PM  Performed by: JHON RAY  Authorized by: JHON RAY     Consent Done?:  Yes (Verbal)  Indications:  Pain  Procedure site marked: Yes    Timeout: Prior to procedure the correct patient, procedure, and site was verified      Location:  Knee  Site:  L knee  Prep: Patient was prepped and draped in usual sterile fashion    Ultrasonic Guidance for needle placement: No  Needle size:  22 G  Approach:  Superior  Medications:  40 mg triamcinolone acetonide 40 mg/mL  Patient tolerance:  Patient tolerated the procedure well with no immediate complications

## 2017-10-17 ENCOUNTER — CLINICAL SUPPORT (OUTPATIENT)
Dept: PRIMARY CARE CLINIC | Facility: CLINIC | Age: 67
End: 2017-10-17
Payer: MEDICARE

## 2017-10-17 DIAGNOSIS — Z00.00 ROUTINE GENERAL MEDICAL EXAMINATION AT A HEALTH CARE FACILITY: Primary | ICD-10-CM

## 2017-10-17 LAB
ALBUMIN SERPL BCP-MCNC: 3.3 G/DL
ALP SERPL-CCNC: 61 U/L
ALT SERPL W/O P-5'-P-CCNC: 25 U/L
ANION GAP SERPL CALC-SCNC: 11 MMOL/L
AST SERPL-CCNC: 19 U/L
BASOPHILS # BLD AUTO: 0.02 K/UL
BASOPHILS NFR BLD: 0.3 %
BILIRUB SERPL-MCNC: 1 MG/DL
BUN SERPL-MCNC: 11 MG/DL
CALCIUM SERPL-MCNC: 9 MG/DL
CHLORIDE SERPL-SCNC: 107 MMOL/L
CHOLEST SERPL-MCNC: 144 MG/DL
CHOLEST/HDLC SERPL: 3.6 {RATIO}
CO2 SERPL-SCNC: 21 MMOL/L
CREAT SERPL-MCNC: 0.8 MG/DL
DIFFERENTIAL METHOD: ABNORMAL
EOSINOPHIL # BLD AUTO: 0.3 K/UL
EOSINOPHIL NFR BLD: 4.1 %
ERYTHROCYTE [DISTWIDTH] IN BLOOD BY AUTOMATED COUNT: 14.1 %
EST. GFR  (AFRICAN AMERICAN): >60 ML/MIN/1.73 M^2
EST. GFR  (NON AFRICAN AMERICAN): >60 ML/MIN/1.73 M^2
GLUCOSE SERPL-MCNC: 98 MG/DL
HCT VFR BLD AUTO: 47.8 %
HDLC SERPL-MCNC: 40 MG/DL
HDLC SERPL: 27.8 %
HGB BLD-MCNC: 16.3 G/DL
IMM GRANULOCYTES # BLD AUTO: 0.03 K/UL
IMM GRANULOCYTES NFR BLD AUTO: 0.5 %
LDLC SERPL CALC-MCNC: 82.6 MG/DL
LYMPHOCYTES # BLD AUTO: 1.9 K/UL
LYMPHOCYTES NFR BLD: 30.3 %
MCH RBC QN AUTO: 30.8 PG
MCHC RBC AUTO-ENTMCNC: 34.1 G/DL
MCV RBC AUTO: 90 FL
MONOCYTES # BLD AUTO: 0.5 K/UL
MONOCYTES NFR BLD: 8.2 %
NEUTROPHILS # BLD AUTO: 3.5 K/UL
NEUTROPHILS NFR BLD: 56.6 %
NONHDLC SERPL-MCNC: 104 MG/DL
NRBC BLD-RTO: 0 /100 WBC
PLATELET # BLD AUTO: 136 K/UL
PMV BLD AUTO: 11.5 FL
POTASSIUM SERPL-SCNC: 4.2 MMOL/L
PROT SERPL-MCNC: 6.7 G/DL
RBC # BLD AUTO: 5.29 M/UL
SODIUM SERPL-SCNC: 139 MMOL/L
T4 SERPL-MCNC: 4.6 UG/DL
TRIGL SERPL-MCNC: 107 MG/DL
TSH SERPL DL<=0.005 MIU/L-ACNC: 1.46 UIU/ML
WBC # BLD AUTO: 6.13 K/UL

## 2017-10-17 PROCEDURE — 99999 PR PBB SHADOW E&M-EST. PATIENT-LVL I: CPT | Mod: PBBFAC,,,

## 2017-10-17 PROCEDURE — 84443 ASSAY THYROID STIM HORMONE: CPT

## 2017-10-17 PROCEDURE — 85025 COMPLETE CBC W/AUTO DIFF WBC: CPT

## 2017-10-17 PROCEDURE — 80053 COMPREHEN METABOLIC PANEL: CPT

## 2017-10-17 PROCEDURE — 99211 OFF/OP EST MAY X REQ PHY/QHP: CPT | Mod: PBBFAC,PN

## 2017-10-17 PROCEDURE — 80061 LIPID PANEL: CPT

## 2017-10-17 PROCEDURE — 84436 ASSAY OF TOTAL THYROXINE: CPT

## 2017-10-18 ENCOUNTER — TELEPHONE (OUTPATIENT)
Dept: PRIMARY CARE CLINIC | Facility: CLINIC | Age: 67
End: 2017-10-18

## 2017-10-18 NOTE — TELEPHONE ENCOUNTER
----- Message from Royal Posey sent at 10/18/2017 10:43 AM CDT -----  Contact: Marimar Lemon want to know if you can fax patient lab results phone number [phone], any fax number [phone]

## 2017-10-18 NOTE — TELEPHONE ENCOUNTER
Spoke with pt, notified him that he has to come in for an order for sleep study. States he is coming in tomorrow

## 2017-10-18 NOTE — TELEPHONE ENCOUNTER
----- Message from Ana Laura Ny sent at 10/12/2017  2:12 PM CDT -----  Contact: Patient  Rip, patient [phone], Calling for an order for a new sleep study. Please advise. Thanks.

## 2017-10-19 ENCOUNTER — OFFICE VISIT (OUTPATIENT)
Dept: PRIMARY CARE CLINIC | Facility: CLINIC | Age: 67
End: 2017-10-19
Payer: MEDICARE

## 2017-10-19 VITALS
DIASTOLIC BLOOD PRESSURE: 77 MMHG | HEART RATE: 87 BPM | BODY MASS INDEX: 45.87 KG/M2 | TEMPERATURE: 98 F | RESPIRATION RATE: 18 BRPM | OXYGEN SATURATION: 99 % | WEIGHT: 315 LBS | SYSTOLIC BLOOD PRESSURE: 116 MMHG

## 2017-10-19 DIAGNOSIS — Z23 IMMUNIZATION, PNEUMOCOCCUS AND INFLUENZA: ICD-10-CM

## 2017-10-19 DIAGNOSIS — G47.33 OSA (OBSTRUCTIVE SLEEP APNEA): ICD-10-CM

## 2017-10-19 DIAGNOSIS — E66.9 OBESITY, UNSPECIFIED CLASSIFICATION, UNSPECIFIED OBESITY TYPE, UNSPECIFIED WHETHER SERIOUS COMORBIDITY PRESENT: ICD-10-CM

## 2017-10-19 DIAGNOSIS — I10 HYPERTENSION, UNSPECIFIED TYPE: Primary | ICD-10-CM

## 2017-10-19 DIAGNOSIS — E78.5 HYPERLIPIDEMIA, UNSPECIFIED HYPERLIPIDEMIA TYPE: ICD-10-CM

## 2017-10-19 DIAGNOSIS — Z23 NEED FOR IMMUNIZATION AGAINST INFLUENZA: ICD-10-CM

## 2017-10-19 DIAGNOSIS — M19.90 OSTEOARTHRITIS, UNSPECIFIED OSTEOARTHRITIS TYPE, UNSPECIFIED SITE: ICD-10-CM

## 2017-10-19 DIAGNOSIS — D69.6 THROMBOCYTOPENIA: ICD-10-CM

## 2017-10-19 PROCEDURE — G0009 ADMIN PNEUMOCOCCAL VACCINE: HCPCS | Mod: PBBFAC,PN

## 2017-10-19 PROCEDURE — 99213 OFFICE O/P EST LOW 20 MIN: CPT | Mod: PBBFAC,PN | Performed by: FAMILY MEDICINE

## 2017-10-19 PROCEDURE — 99999 PR PBB SHADOW E&M-EST. PATIENT-LVL III: CPT | Mod: PBBFAC,,, | Performed by: FAMILY MEDICINE

## 2017-10-19 PROCEDURE — 90732 PPSV23 VACC 2 YRS+ SUBQ/IM: CPT | Mod: PBBFAC,PN

## 2017-10-19 PROCEDURE — 99213 OFFICE O/P EST LOW 20 MIN: CPT | Mod: S$PBB,,, | Performed by: FAMILY MEDICINE

## 2017-10-19 NOTE — PROGRESS NOTES
Pneumococcal vaccine 0.5ml IM given  Left deltoid, tolerated well.  VIS given.  Voiced no complaints.

## 2017-10-19 NOTE — PROGRESS NOTES
Subjective:       Patient ID: Shon Asencio is a 67 y.o. male.    Chief Complaint: Results    HPI: 66 yo WM -- lab Plts 136   ROS:  Skin: no psoriasis, eczema, skin cancer had skin lesion txed with mask and UV light x 3   HEENT: No headache, ocular pain, blurred vision, diplopia, epistaxis, hoarseness change in voice, thyroid trouble  Lung: No pneumonia, asthma, Tb, wheezing, SOB, smoking less 1/2 ppd  Heart: No chest pain, ankle edema, palpitations, MI, barby murmur,+ hypertension, + hyperlipidemia  Abdomen: No nausea, vomiting, diarrhea, constipation, ulcers, hepatitis, gallbladder disease, melena, hematochezia, hematemesis  : no UTI, renal disease, stones, seen by urologist PSA was normal   MS: no fractures,+ O/A, lupus, rheumatoid, gout  Neuro: No dizziness, LOC, seizures   No diabetes, no anemia, no anxiety, no depression    5 children 12 grandchildren     Objective:   Physical Exam:  General: Well nourished, well developed, no acute distress  Skin: No lesions  HEENT: Eyes PERRLA, EOM intact, nose patent, throat non-erythematous   NECK: Supple, no bruits, No JVD, no nodes  Lungs: Clear, no rales, rhonchi, wheezing  Heart: Regular rate and rhythm, no murmurs, gallops, or rubs  Abdomen: flat, bowel sounds positive, no tenderness, or organomegaly  MS: Range of motion and muscle strength intact  Neuro: Alert, CN intact, oriented X 3  Extremities: No cyanosis, clubbing, or edema         Assessment:       1. Hypertension, unspecified type    2. Hyperlipidemia, unspecified hyperlipidemia type    3. Osteoarthritis, unspecified osteoarthritis type, unspecified site    4. BURT (obstructive sleep apnea)    5. Thrombocytopenia    6. Obesity, unspecified classification, unspecified obesity type, unspecified whether serious comorbidity present        Plan:       Hypertension, unspecified type    Hyperlipidemia, unspecified hyperlipidemia type    Osteoarthritis, unspecified osteoarthritis type, unspecified  site    BURT (obstructive sleep apnea)  -     Polysomnogram (CPAP will be added if patient meets diagnostic criteria.); Future    Thrombocytopenia    Obesity, unspecified classification, unspecified obesity type, unspecified whether serious comorbidity present

## 2017-10-19 NOTE — PATIENT INSTRUCTIONS
Lab in 6 mo CBC,CMP,lipid,PSA, U/A see me 2 weeks later   Needs sleep apnea study   Flu shot, pneumovax 13 had pneumovax 23 2015 ,needs  shingles shot

## 2017-12-12 ENCOUNTER — TELEPHONE (OUTPATIENT)
Dept: PRIMARY CARE CLINIC | Facility: CLINIC | Age: 67
End: 2017-12-12

## 2017-12-12 NOTE — TELEPHONE ENCOUNTER
----- Message from Nikole Triana sent at 12/12/2017  9:26 AM CST -----  Contact: [employer]  [employer] calling states they are calling on the status of a fax that they faxed over [facility] for a Sleep Study for the pt CPT signed by the  First faxed 11/13 & 11/28 and still have not gotten a response....[phone] ext [phone]

## 2017-12-15 ENCOUNTER — TELEPHONE (OUTPATIENT)
Dept: PRIMARY CARE CLINIC | Facility: CLINIC | Age: 67
End: 2017-12-15

## 2017-12-15 NOTE — TELEPHONE ENCOUNTER
Luis Rousseau notified that order was faxed last night gave me new fax number to resend to today [phone] refaxed signed order

## 2017-12-15 NOTE — TELEPHONE ENCOUNTER
----- Message from Angelina Miller sent at 12/15/2017  1:38 PM CST -----  Contact: [facility] with [facility]  Patient is trying to get a second sleep study done and they are still waiting on the CPT Order.  If you could put ASAP on it then they can process it right away.  Call Back#[phone]  Fax# [phone]  Thanks

## 2018-01-15 ENCOUNTER — OFFICE VISIT (OUTPATIENT)
Dept: PRIMARY CARE CLINIC | Facility: CLINIC | Age: 68
End: 2018-01-15
Payer: MEDICARE

## 2018-01-15 VITALS
OXYGEN SATURATION: 97 % | HEART RATE: 76 BPM | DIASTOLIC BLOOD PRESSURE: 79 MMHG | TEMPERATURE: 98 F | SYSTOLIC BLOOD PRESSURE: 119 MMHG | HEIGHT: 75 IN | RESPIRATION RATE: 18 BRPM

## 2018-01-15 DIAGNOSIS — E78.5 HYPERLIPIDEMIA, UNSPECIFIED HYPERLIPIDEMIA TYPE: ICD-10-CM

## 2018-01-15 DIAGNOSIS — M25.562 CHRONIC PAIN OF LEFT KNEE: ICD-10-CM

## 2018-01-15 DIAGNOSIS — G47.33 OSA (OBSTRUCTIVE SLEEP APNEA): Primary | ICD-10-CM

## 2018-01-15 DIAGNOSIS — G89.29 CHRONIC PAIN OF LEFT KNEE: ICD-10-CM

## 2018-01-15 DIAGNOSIS — M19.90 OSTEOARTHRITIS, UNSPECIFIED OSTEOARTHRITIS TYPE, UNSPECIFIED SITE: ICD-10-CM

## 2018-01-15 DIAGNOSIS — D69.6 THROMBOCYTOPENIA: ICD-10-CM

## 2018-01-15 DIAGNOSIS — I10 HYPERTENSION, UNSPECIFIED TYPE: ICD-10-CM

## 2018-01-15 PROCEDURE — 99213 OFFICE O/P EST LOW 20 MIN: CPT | Mod: S$PBB,,, | Performed by: FAMILY MEDICINE

## 2018-01-15 PROCEDURE — 99213 OFFICE O/P EST LOW 20 MIN: CPT | Mod: PBBFAC,PN | Performed by: FAMILY MEDICINE

## 2018-01-15 PROCEDURE — 99999 PR PBB SHADOW E&M-EST. PATIENT-LVL III: CPT | Mod: PBBFAC,,, | Performed by: FAMILY MEDICINE

## 2018-01-15 NOTE — PROGRESS NOTES
Subjective:       Patient ID: Shon Asencio is a 67 y.o. male.    Chief Complaint: CPAP order    HPI: 67-year-old white male in for obstructive sleep apnea-needs to get new supplies from a different company so told had to be retested.. Apneic episodes 17.9 per hour setting 14 cm H2O with large size ResMed nasal pillows mask airfit P10 and and heat humidification     Patient history of basal cell cancer removed from the right forehead had about 30 stitches.  His pet dog head but edema and 23 stitches became disengaged leaving only 7 intact.  Just saw a dermatologist today states healing well    ROS:  Skin: no psoriasis, eczema, skin cancer  HEENT: No headache, ocular pain, blurred vision, diplopia, epistaxis, hoarseness change in voice, thyroid trouble  Lung: No pneumonia, asthma, Tb, wheezing, SOB, + BURT see history of present illness  Heart: No chest pain, ankle edema, palpitations, MI, barby murmur, +hypertension,+ hyperlipidemia  Abdomen: No nausea, vomiting, diarrhea, constipation, ulcers, hepatitis, gallbladder disease, melena, hematochezia, hematemesis  : no UTI, renal disease, stones  MS: no fractures,+  O/A had left knee surgery operated on incorrect,no  lupus, rheumatoid, gout  Neuro: No dizziness, LOC, seizures   No diabetes, no anemia + thrombocytopenia, no anxiety, no depression     Objective:   Physical Exam:  General: Well nourished, well developed, no acute distress + overweight   Skin: No lesions  HEENT: Eyes PERRLA, EOM intact, nose patent, throat non-erythematous   NECK: Supple, no bruits, No JVD, no nodes  Lungs: Clear, no rales, rhonchi, wheezing decreased breath sounds but clear  Heart: Regular rate and rhythm, no murmurs, gallops, or rubs  Abdomen: flat, bowel sounds positive, no tenderness, or organomegaly  MS: Range of motion and muscle strength intact  Neuro: Alert, CN intact, oriented X 3  Extremities: No cyanosis, clubbing, or edema         Assessment:       1. BURT (obstructive  sleep apnea)    2. Hypertension, unspecified type    3. Hyperlipidemia, unspecified hyperlipidemia type    4. Thrombocytopenia    5. Osteoarthritis, unspecified osteoarthritis type, unspecified site    6. Chronic pain of left knee        Plan:       BURT (obstructive sleep apnea)    Hypertension, unspecified type    Hyperlipidemia, unspecified hyperlipidemia type    Thrombocytopenia    Osteoarthritis, unspecified osteoarthritis type, unspecified site    Chronic pain of left knee      patient with obstructive sleep apnea needs new CPAP machine sleep apnea 17.9 per hour CPAP setting 14 cm H2O large side ResMed nasal pillow mask airfit P 10 mask and heated humidification.ty

## 2018-01-22 ENCOUNTER — TELEPHONE (OUTPATIENT)
Dept: PRIMARY CARE CLINIC | Facility: CLINIC | Age: 68
End: 2018-01-22

## 2018-01-22 NOTE — TELEPHONE ENCOUNTER
----- Message from Kristel Graf sent at 1/16/2018  4:33 PM CST -----  Rip patient [phone], Checking on the fax sent earlier to day for his CPAP machine. Please advise, today. thanks.

## 2018-02-05 ENCOUNTER — TELEPHONE (OUTPATIENT)
Dept: PRIMARY CARE CLINIC | Facility: CLINIC | Age: 68
End: 2018-02-05

## 2018-02-05 NOTE — TELEPHONE ENCOUNTER
----- Message from Ronny Live sent at 1/31/2018 12:14 PM CST -----  Contact: same  Patient called in and wanted to schedule an appt for a procedure type to have a davis card removed from his C-Pap machine??  Patient would like a call back at [phone]

## 2018-02-20 ENCOUNTER — TELEPHONE (OUTPATIENT)
Dept: PRIMARY CARE CLINIC | Facility: CLINIC | Age: 68
End: 2018-02-20

## 2018-02-20 NOTE — TELEPHONE ENCOUNTER
----- Message from Aga Dillon RT sent at 2/5/2018  2:22 PM CST -----  Contact: pt    Called pod, pt , returned missed call, thanks.

## 2018-02-20 NOTE — TELEPHONE ENCOUNTER
Left vm to [facility] that patient needs to either call them OR a DME company that can remove?

## 2018-03-12 ENCOUNTER — OFFICE VISIT (OUTPATIENT)
Dept: PRIMARY CARE CLINIC | Facility: CLINIC | Age: 68
End: 2018-03-12
Payer: MEDICARE

## 2018-03-12 VITALS
RESPIRATION RATE: 18 BRPM | WEIGHT: 315 LBS | OXYGEN SATURATION: 97 % | TEMPERATURE: 98 F | BODY MASS INDEX: 45 KG/M2 | HEART RATE: 88 BPM | DIASTOLIC BLOOD PRESSURE: 90 MMHG | SYSTOLIC BLOOD PRESSURE: 132 MMHG

## 2018-03-12 DIAGNOSIS — E66.9 OBESITY, UNSPECIFIED CLASSIFICATION, UNSPECIFIED OBESITY TYPE, UNSPECIFIED WHETHER SERIOUS COMORBIDITY PRESENT: ICD-10-CM

## 2018-03-12 DIAGNOSIS — E78.5 HYPERLIPIDEMIA, UNSPECIFIED HYPERLIPIDEMIA TYPE: ICD-10-CM

## 2018-03-12 DIAGNOSIS — D69.6 THROMBOCYTOPENIA: ICD-10-CM

## 2018-03-12 DIAGNOSIS — G47.33 OSA (OBSTRUCTIVE SLEEP APNEA): Primary | ICD-10-CM

## 2018-03-12 DIAGNOSIS — M19.90 OSTEOARTHRITIS, UNSPECIFIED OSTEOARTHRITIS TYPE, UNSPECIFIED SITE: ICD-10-CM

## 2018-03-12 DIAGNOSIS — I10 HYPERTENSION, UNSPECIFIED TYPE: ICD-10-CM

## 2018-03-12 PROCEDURE — 99213 OFFICE O/P EST LOW 20 MIN: CPT | Mod: PBBFAC,PN | Performed by: FAMILY MEDICINE

## 2018-03-12 PROCEDURE — 99213 OFFICE O/P EST LOW 20 MIN: CPT | Mod: S$PBB,,, | Performed by: FAMILY MEDICINE

## 2018-03-12 PROCEDURE — 99999 PR PBB SHADOW E&M-EST. PATIENT-LVL III: CPT | Mod: PBBFAC,,, | Performed by: FAMILY MEDICINE

## 2018-03-12 RX ORDER — DABIGATRAN ETEXILATE MESYLATE 150 MG/1
1 CAPSULE ORAL 2 TIMES DAILY
COMMUNITY
Start: 2018-01-05 | End: 2018-03-12 | Stop reason: ALTCHOICE

## 2018-03-12 NOTE — PROGRESS NOTES
Subjective:       Patient ID: Shon Asencio is a 67 y.o. male.    Chief Complaint: Follow-up (cpap)    HPI: 67-year-old white male in for CPAP review--- patient needs a note stating that the CPAP machine has either helped him does not snore as much, has more energy, is sleeping better to review a download of the therapy.  Patient has the machine here in the office spoke with one of the room representative on the telephone who will fax me a copy of the download right now to review and I will write a prescription confirming the above information that the snoring has improved.  He is sleeping better that he has more energy and that the download shows that his therapy is appropriate and that I recommend patient use this for life     ROS:  Skin: no psoriasis, eczema, skin cancer  HEENT: No headache, ocular pain, blurred vision, diplopia, epistaxis, hoarseness change in voice, thyroid trouble  Lung: No pneumonia, asthma, Tb, wheezing, SOB, social smoke + obstructive sleep apnea see information on CPAP machine above  Heart: No chest pain, ankle edema, palpitations, MI, barby murmur, +hypertension,+ hyperlipidemia  Abdomen: No nausea, vomiting, diarrhea, constipation, ulcers, hepatitis, gallbladder disease, melena, hematochezia, hematemesis  : no UTI, renal disease, stones  MS: no fractures, lupus, rheumatoid, gout + arthritis knees fingers elbows  Neuro: No dizziness, LOC, seizures   No diabetes, no anemia, no anxiety, no depression        Physical Exam:  General: Well nourished, well developed, no acute distress + overweight  Skin: No lesions  HEENT: Eyes PERRLA, EOM intact, nose patent, throat non-erythematous   NECK: Supple, no bruits, No JVD, no nodes  Lungs: Clear, no rales, rhonchi, wheezing decreased breast  Heart: Regular rate and rhythm, no murmurs, gallops, or rubs  Abdomen: flat, bowel sounds positive, no tenderness, or organomegaly  MS: Range of motion and muscle strength intact  Neuro: Alert, CN  intact, oriented X 3  Extremities: No cyanosis, clubbing, nonpitting edema         Assessment:       1. BURT (obstructive sleep apnea)    2. Hypertension, unspecified type    3. Hyperlipidemia, unspecified hyperlipidemia type    4. Thrombocytopenia    5. Obesity, unspecified classification, unspecified obesity type, unspecified whether serious comorbidity present    6. Osteoarthritis, unspecified osteoarthritis type, unspecified site        Plan:       BURT (obstructive sleep apnea)    Hypertension, unspecified type    Hyperlipidemia, unspecified hyperlipidemia type    Thrombocytopenia    Obesity, unspecified classification, unspecified obesity type, unspecified whether serious comorbidity present    Osteoarthritis, unspecified osteoarthritis type, unspecified site      patient had CPAP representative for back on the telephone when I was in the room  I had to verify the patient was benefiting from the CPAP machine  A prescription was written to whom it may concern or tobacco--patient was snoring less, sleeping better, had more energy and that the CPAP download therapy was reviewed.  Patient needs CPAP machine for life  Copy  of prescription given to pt and copy faxed back to CPAP company   Labs are usually done every 6 months and patient should be due to get labs again in about 3 months

## 2018-04-18 ENCOUNTER — CLINICAL SUPPORT (OUTPATIENT)
Dept: PRIMARY CARE CLINIC | Facility: CLINIC | Age: 68
End: 2018-04-18
Payer: MEDICARE

## 2018-04-18 DIAGNOSIS — Z12.5 SCREENING FOR PROSTATE CANCER: Primary | ICD-10-CM

## 2018-04-18 DIAGNOSIS — I10 HYPERTENSION, UNSPECIFIED TYPE: ICD-10-CM

## 2018-04-18 DIAGNOSIS — E78.5 HYPERLIPIDEMIA, UNSPECIFIED HYPERLIPIDEMIA TYPE: ICD-10-CM

## 2018-04-18 LAB
ALBUMIN SERPL BCP-MCNC: 3.3 G/DL
ALP SERPL-CCNC: 60 U/L
ALT SERPL W/O P-5'-P-CCNC: 19 U/L
ANION GAP SERPL CALC-SCNC: 11 MMOL/L
AST SERPL-CCNC: 21 U/L
BASOPHILS # BLD AUTO: 0.04 K/UL
BASOPHILS NFR BLD: 0.6 %
BILIRUB SERPL-MCNC: 0.5 MG/DL
BUN SERPL-MCNC: 13 MG/DL
CALCIUM SERPL-MCNC: 9 MG/DL
CHLORIDE SERPL-SCNC: 109 MMOL/L
CHOLEST SERPL-MCNC: 136 MG/DL
CHOLEST/HDLC SERPL: 3.9 {RATIO}
CO2 SERPL-SCNC: 20 MMOL/L
COMPLEXED PSA SERPL-MCNC: 3.2 NG/ML
CREAT SERPL-MCNC: 0.8 MG/DL
DIFFERENTIAL METHOD: ABNORMAL
EOSINOPHIL # BLD AUTO: 0.3 K/UL
EOSINOPHIL NFR BLD: 4.4 %
ERYTHROCYTE [DISTWIDTH] IN BLOOD BY AUTOMATED COUNT: 14.7 %
EST. GFR  (AFRICAN AMERICAN): >60 ML/MIN/1.73 M^2
EST. GFR  (NON AFRICAN AMERICAN): >60 ML/MIN/1.73 M^2
GLUCOSE SERPL-MCNC: 105 MG/DL
HCT VFR BLD AUTO: 50.8 %
HDLC SERPL-MCNC: 35 MG/DL
HDLC SERPL: 25.7 %
HGB BLD-MCNC: 16.5 G/DL
IMM GRANULOCYTES # BLD AUTO: 0.02 K/UL
IMM GRANULOCYTES NFR BLD AUTO: 0.3 %
LDLC SERPL CALC-MCNC: 81.4 MG/DL
LYMPHOCYTES # BLD AUTO: 1.9 K/UL
LYMPHOCYTES NFR BLD: 30.1 %
MCH RBC QN AUTO: 31 PG
MCHC RBC AUTO-ENTMCNC: 32.5 G/DL
MCV RBC AUTO: 95 FL
MONOCYTES # BLD AUTO: 0.5 K/UL
MONOCYTES NFR BLD: 8.2 %
NEUTROPHILS # BLD AUTO: 3.6 K/UL
NEUTROPHILS NFR BLD: 56.4 %
NONHDLC SERPL-MCNC: 101 MG/DL
NRBC BLD-RTO: 0 /100 WBC
PLATELET # BLD AUTO: 151 K/UL
PMV BLD AUTO: 11.4 FL
POTASSIUM SERPL-SCNC: 4.4 MMOL/L
PROT SERPL-MCNC: 6.5 G/DL
RBC # BLD AUTO: 5.33 M/UL
SODIUM SERPL-SCNC: 140 MMOL/L
TRIGL SERPL-MCNC: 98 MG/DL
WBC # BLD AUTO: 6.34 K/UL

## 2018-04-18 PROCEDURE — 80053 COMPREHEN METABOLIC PANEL: CPT

## 2018-04-18 PROCEDURE — 80061 LIPID PANEL: CPT

## 2018-04-18 PROCEDURE — 84153 ASSAY OF PSA TOTAL: CPT

## 2018-04-18 PROCEDURE — 99212 OFFICE O/P EST SF 10 MIN: CPT | Mod: PBBFAC,PN

## 2018-04-18 PROCEDURE — 85025 COMPLETE CBC W/AUTO DIFF WBC: CPT

## 2018-04-18 PROCEDURE — 99999 PR PBB SHADOW E&M-EST. PATIENT-LVL II: CPT | Mod: PBBFAC,,,

## 2018-04-24 ENCOUNTER — OFFICE VISIT (OUTPATIENT)
Dept: PRIMARY CARE CLINIC | Facility: CLINIC | Age: 68
End: 2018-04-24
Payer: MEDICARE

## 2018-04-24 VITALS
TEMPERATURE: 98 F | DIASTOLIC BLOOD PRESSURE: 78 MMHG | WEIGHT: 315 LBS | HEART RATE: 81 BPM | SYSTOLIC BLOOD PRESSURE: 112 MMHG | RESPIRATION RATE: 18 BRPM | OXYGEN SATURATION: 96 % | HEIGHT: 75 IN | BODY MASS INDEX: 39.17 KG/M2

## 2018-04-24 DIAGNOSIS — M19.90 OSTEOARTHRITIS, UNSPECIFIED OSTEOARTHRITIS TYPE, UNSPECIFIED SITE: ICD-10-CM

## 2018-04-24 DIAGNOSIS — I10 HYPERTENSION, UNSPECIFIED TYPE: Primary | ICD-10-CM

## 2018-04-24 DIAGNOSIS — Z91.09 ENVIRONMENTAL ALLERGIES: ICD-10-CM

## 2018-04-24 DIAGNOSIS — E66.9 OBESITY, UNSPECIFIED CLASSIFICATION, UNSPECIFIED OBESITY TYPE, UNSPECIFIED WHETHER SERIOUS COMORBIDITY PRESENT: ICD-10-CM

## 2018-04-24 DIAGNOSIS — G47.33 OSA (OBSTRUCTIVE SLEEP APNEA): ICD-10-CM

## 2018-04-24 DIAGNOSIS — E78.5 HYPERLIPIDEMIA, UNSPECIFIED HYPERLIPIDEMIA TYPE: ICD-10-CM

## 2018-04-24 PROCEDURE — 99999 PR PBB SHADOW E&M-EST. PATIENT-LVL III: CPT | Mod: PBBFAC,,, | Performed by: FAMILY MEDICINE

## 2018-04-24 PROCEDURE — 99213 OFFICE O/P EST LOW 20 MIN: CPT | Mod: S$PBB,,, | Performed by: FAMILY MEDICINE

## 2018-04-24 PROCEDURE — 99213 OFFICE O/P EST LOW 20 MIN: CPT | Mod: PBBFAC,PN | Performed by: FAMILY MEDICINE

## 2018-04-24 RX ORDER — FLUTICASONE PROPIONATE 50 MCG
SPRAY, SUSPENSION (ML) NASAL
Qty: 3 BOTTLE | Refills: 3 | Status: SHIPPED | OUTPATIENT
Start: 2018-04-24 | End: 2018-11-09

## 2018-04-24 RX ORDER — LORATADINE 10 MG/1
10 TABLET ORAL DAILY
Qty: 90 TABLET | Refills: 3 | Status: SHIPPED | OUTPATIENT
Start: 2018-04-24 | End: 2018-11-09

## 2018-04-24 NOTE — PROGRESS NOTES
Subjective:       Patient ID: Shon Asencio is a 67 y.o. male.    Chief Complaint: Follow-up (6 month check up )    HPI: 67-year-old white male in for checkup and results HDL 35 can increase with exercise.  Eating well, + BM, ambulating    ROS:  Skin: no psoriasis, eczema, skin cancer  HEENT: No headache, ocular pain, blurred vision, diplopia, epistaxis, hoarseness change in voice, thyroid trouble  Lung: No pneumonia, asthma, Tb, wheezing, SOB, smoking quarter pack per day + BURT on CPAP  Heart: No chest pain, ankle edema, palpitations, MI, barby murmur,+ hypertension, +hyperlipidemia  Abdomen: No nausea, vomiting, diarrhea, constipation, ulcers, hepatitis, gallbladder disease, melena, hematochezia, hematemesis  : no UTI, renal disease, stones prostate  MS: no fractures, +O/A knees elbows fingers but does well with Celebrex,no  lupus, rheumatoid, gout  Neuro: No dizziness, LOC, seizures   No diabetes, no anemia, no anxiety, no depression     Objective:   Physical Exam:  General: Well nourished, well developed, no acute distress + obese   Skin: No lesions  HEENT: Eyes PERRLA, EOM intact, nose patent, throat non-erythematous   NECK: Supple, no bruits, No JVD, no nodes  Lungs: Clear, no rales, rhonchi, wheezing  Heart: Regular rate and rhythm, no murmurs, gallops, or rubs  Abdomen: flat, bowel sounds positive, no tenderness, or organomegaly  MS: Range of motion and muscle strength intact  Neuro: Alert, CN intact, oriented X 3  Extremities: No cyanosis, clubbing, or edema         Assessment:       1. Hypertension, unspecified type    2. Environmental allergies    3. Hyperlipidemia, unspecified hyperlipidemia type    4. Obesity, unspecified classification, unspecified obesity type, unspecified whether serious comorbidity present    5. Osteoarthritis, unspecified osteoarthritis type, unspecified site    6. BURT (obstructive sleep apnea)        Plan:       Hypertension, unspecified type    Environmental  allergies    Hyperlipidemia, unspecified hyperlipidemia type    Obesity, unspecified classification, unspecified obesity type, unspecified whether serious comorbidity present    Osteoarthritis, unspecified osteoarthritis type, unspecified site    BURT (obstructive sleep apnea)    Other orders  -     loratadine (CLARITIN) 10 mg tablet; Take 1 tablet (10 mg total) by mouth once daily.  Dispense: 90 tablet; Refill: 3  -     fluticasone (FLONASE) 50 mcg/actuation nasal spray; 2 sprays each nostril every morning give 3 sprays is a 90 day supply  Dispense: 3 Bottle; Refill: 3      patient complaining of ALLERGIES to take Claritin 10 mg 1 by mouth daily #93 refills Flonase 2 sprays every morning 3 sprays 3 refills [facility] if not Claritin OTC  Reviewed all labs should repeat lab in 6 months

## 2018-10-30 DIAGNOSIS — M51.36 DDD (DEGENERATIVE DISC DISEASE), LUMBAR: Primary | ICD-10-CM

## 2018-11-08 ENCOUNTER — TELEPHONE (OUTPATIENT)
Dept: ORTHOPEDICS | Facility: CLINIC | Age: 68
End: 2018-11-08

## 2018-11-08 ENCOUNTER — CLINICAL SUPPORT (OUTPATIENT)
Dept: PRIMARY CARE CLINIC | Facility: CLINIC | Age: 68
End: 2018-11-08
Payer: MEDICARE

## 2018-11-08 DIAGNOSIS — Z23 NEED FOR PROPHYLACTIC VACCINATION AND INOCULATION AGAINST INFLUENZA: Primary | ICD-10-CM

## 2018-11-08 PROCEDURE — 90662 IIV NO PRSV INCREASED AG IM: CPT | Mod: PBBFAC,PN

## 2018-11-09 ENCOUNTER — OFFICE VISIT (OUTPATIENT)
Dept: ORTHOPEDICS | Facility: CLINIC | Age: 68
End: 2018-11-09
Payer: MEDICARE

## 2018-11-09 ENCOUNTER — HOSPITAL ENCOUNTER (OUTPATIENT)
Dept: RADIOLOGY | Facility: HOSPITAL | Age: 68
Discharge: HOME | End: 2018-11-09
Attending: PHYSICIAN ASSISTANT
Payer: MEDICARE

## 2018-11-09 VITALS — BODY MASS INDEX: 39.17 KG/M2 | HEIGHT: 75 IN | WEIGHT: 315 LBS

## 2018-11-09 DIAGNOSIS — M54.16 LUMBAR RADICULOPATHY: Primary | ICD-10-CM

## 2018-11-09 DIAGNOSIS — M51.36 DDD (DEGENERATIVE DISC DISEASE), LUMBAR: ICD-10-CM

## 2018-11-09 PROCEDURE — 99999 PR PBB SHADOW E&M-EST. PATIENT-LVL III: CPT | Mod: PBBFAC,,, | Performed by: PHYSICIAN ASSISTANT

## 2018-11-09 PROCEDURE — 72120 X-RAY BEND ONLY L-S SPINE: CPT | Mod: 26,,, | Performed by: RADIOLOGY

## 2018-11-09 PROCEDURE — 99214 OFFICE O/P EST MOD 30 MIN: CPT | Mod: S$PBB,,, | Performed by: PHYSICIAN ASSISTANT

## 2018-11-09 PROCEDURE — 72120 X-RAY BEND ONLY L-S SPINE: CPT | Mod: TC

## 2018-11-09 PROCEDURE — 72100 X-RAY EXAM L-S SPINE 2/3 VWS: CPT | Mod: 26,,, | Performed by: RADIOLOGY

## 2018-11-09 PROCEDURE — 99213 OFFICE O/P EST LOW 20 MIN: CPT | Mod: PBBFAC,25 | Performed by: PHYSICIAN ASSISTANT

## 2018-11-09 RX ORDER — TRAMADOL HYDROCHLORIDE 50 MG/1
50 TABLET ORAL EVERY 4 HOURS PRN
Qty: 42 TABLET | Refills: 0 | Status: SHIPPED | OUTPATIENT
Start: 2018-11-09 | End: 2019-06-04 | Stop reason: SDUPTHER

## 2018-11-09 NOTE — PROGRESS NOTES
"DATE: 11/9/2018  PATIENT: Shon Asencio    Attending Physician: Jonathan Patterson M.D.    HISTORY:  Shon Asencio is a 68 y.o. male who returns to me today for follow up of low back and right leg pain.  He was last seen by me 4/17/2017.  Today he is doing well but notes he was doing well and managing the pain with tramadol once or twice a month but he ran out of the prescription and the pain is really bothering him again.  There is no numbness or tingling.  There is no weakness.     The Patient denies myelopathic symptoms such as handwriting changes or difficulty with buttons/coins/keys. Denies perineal paresthesias, bowel/bladder dysfunction.    PMH/PSH/FamHx/SocHx:  Unchanged from prior visit    ROS:  REVIEW OF SYSTEMS:  Constitution: Negative. Negative for chills, fever and night sweats.   HENT: Negative for congestion and headaches.    Eyes: Negative for blurred vision, left vision loss and right vision loss.   Cardiovascular: Negative for chest pain and syncope.   Respiratory: Negative for cough and shortness of breath.    Endocrine: Negative for polydipsia, polyphagia and polyuria.   Hematologic/Lymphatic: Negative for bleeding problem. Does not bruise/bleed easily.   Skin: Negative for dry skin, itching and rash.   Musculoskeletal: Negative for falls and muscle weakness.   Gastrointestinal: Negative for abdominal pain and bowel incontinence.   Allergic/Immunologic: Negative for hives and persistent infections.  Genitourinary: Negative for urinary retention/incontinence and nocturia.   Neurological: negative for disturbances in coordination, no myelopathic symptoms such as handwriting changes or difficulty with buttons, coins, keys or small objects. No loss of balance and seizures.   Psychiatric/Behavioral: Negative for depression. The patient does not have insomnia.   Denies perineal paresthesias, bowel or bladder incontinence    EXAM:  Ht 6' 3" (1.905 m)   Wt (!) 169.6 kg (373 lb 12.6 oz)   BMI " 46.72 kg/m²     My physical examination was notable for the following findings:     Normal station and gait, no difficulty with toe or heel walk.   Dorsal lumbar skin negative for rashes, lesions, hairy patches and surgical scars. There is minimal lumbar tenderness to palpation.  Lumbar range of motion is acceptable.  Global saggital and coronal spinal balance acceptable, not significant for scoliosis and kyphosis.  No pain with the range of motion of the bilateral hips. No trochanteric tenderness to palpation.  Bilateral lower extremities warm and well perfused, dorsalis pedis pulses 2+ bilaterally.  Normal strength and tone in all major motor groups in the bilateral lower extremities. Normal sensation to light touch in the L2-S1 dermatomes bilaterally.  Deep tendon reflexes symmetric 1+ in the bilateral lower extremities.  Negative Babinski bilaterally. Straight leg raise negative bilaterally.      IMAGING:    Today I personally reviewed AP, Lat and Flex/Ex  upright L-spine that demonstrate grade I anterolisthesis of L4/5.    MRI lumbar spine from 2017 shows facet arthropathy at L4/5.       Body mass index is 46.72 kg/m².    No results found for: HGBA1C      ASSESSMENT/PLAN:    Diagnoses and all orders for this visit:    Lumbar radiculopathy    Other orders  -     traMADol (ULTRAM) 50 mg tablet; Take 1 tablet (50 mg total) by mouth every 4 (four) hours as needed for Pain.        The patient's pain is manageable with tramadol a couple times a month.  I have given him a week prescription.  He will follow up if his symptoms persist.  We will consider PT vs ESIs at that time.       Follow-up if symptoms worsen or fail to improve.

## 2018-11-13 ENCOUNTER — OFFICE VISIT (OUTPATIENT)
Dept: PRIMARY CARE CLINIC | Facility: CLINIC | Age: 68
End: 2018-11-13
Payer: MEDICARE

## 2018-11-13 VITALS
SYSTOLIC BLOOD PRESSURE: 126 MMHG | OXYGEN SATURATION: 97 % | HEIGHT: 75 IN | BODY MASS INDEX: 39.17 KG/M2 | WEIGHT: 315 LBS | DIASTOLIC BLOOD PRESSURE: 89 MMHG | HEART RATE: 86 BPM | RESPIRATION RATE: 18 BRPM

## 2018-11-13 DIAGNOSIS — I10 HYPERTENSION, UNSPECIFIED TYPE: Primary | ICD-10-CM

## 2018-11-13 DIAGNOSIS — G47.33 OSA (OBSTRUCTIVE SLEEP APNEA): ICD-10-CM

## 2018-11-13 DIAGNOSIS — E78.5 HYPERLIPIDEMIA, UNSPECIFIED HYPERLIPIDEMIA TYPE: ICD-10-CM

## 2018-11-13 DIAGNOSIS — M25.562 CHRONIC PAIN OF LEFT KNEE: ICD-10-CM

## 2018-11-13 DIAGNOSIS — G89.29 CHRONIC PAIN OF LEFT KNEE: ICD-10-CM

## 2018-11-13 DIAGNOSIS — M19.90 OSTEOARTHRITIS, UNSPECIFIED OSTEOARTHRITIS TYPE, UNSPECIFIED SITE: ICD-10-CM

## 2018-11-13 DIAGNOSIS — E66.9 OBESITY, UNSPECIFIED CLASSIFICATION, UNSPECIFIED OBESITY TYPE, UNSPECIFIED WHETHER SERIOUS COMORBIDITY PRESENT: ICD-10-CM

## 2018-11-13 DIAGNOSIS — D69.6 THROMBOCYTOPENIA: ICD-10-CM

## 2018-11-13 PROCEDURE — 99213 OFFICE O/P EST LOW 20 MIN: CPT | Mod: PBBFAC,PN | Performed by: FAMILY MEDICINE

## 2018-11-13 PROCEDURE — 99999 PR PBB SHADOW E&M-EST. PATIENT-LVL III: CPT | Mod: PBBFAC,,, | Performed by: FAMILY MEDICINE

## 2018-11-13 PROCEDURE — 99213 OFFICE O/P EST LOW 20 MIN: CPT | Mod: S$PBB,,, | Performed by: FAMILY MEDICINE

## 2018-11-13 NOTE — PROGRESS NOTES
Subjective:       Patient ID: Shon Asencio is a 68 y.o. male.    Chief Complaint: Results (check up)    HPI: 67-year-old white male --needs checkup and refills.  Eating well, +BM,.  Ambulating well.  Problems with right sciatic nerve --seeing Yamini Huerta --[place] [facility]--told everything OK but 1 vertebra our line.  MRI of the lumbar spine showed anterior listhesis L4-5 with areas of spinal stenosis.  Patient scheduled to be seen Dec. Txed with pain meds see if pain resolves     ROS:  Skin: no psoriasis, eczema, skin cancer  HEENT: No headache, ocular pain, blurred vision, diplopia, epistaxis, hoarseness change in voice, thyroid trouble  Lung: No pneumonia, asthma, Tb, wheezing, SOB, smoking quarter pack per day + BURT on CPAP  Heart: No chest pain, ankle edema, palpitations, MI, barby murmur,+ hypertension, +hyperlipidemia +atrial fib-was cardioverted years ago 2001 but recurred immediately   Abdomen: No nausea, vomiting, diarrhea, constipation, ulcers, hepatitis, gallbladder disease, melena, hematochezia, hematemesis  : no UTI, renal disease, stones prostate  MS: no fractures, +O/A knees elbows fingers but does well with Celebrex,no  lupus, rheumatoid, gout .  Now patient has sciatica see history of present  Neuro: No dizziness, LOC, seizures    No diabetes, no anemia, no anxiety, no depression   , 5 children 13 grandson Retired     Objective:   Physical Exam:  General: Well nourished, well developed, no acute distress + obese   Skin: No lesions  HEENT: Eyes PERRLA, EOM intact, nose patent, throat non-erythematous   NECK: Supple, no bruits, No JVD, no nodes  Lungs: Clear, no rales, rhonchi, wheezing  Heart: Regular rate and rhythm, no murmurs, gallops, or rubs  Abdomen: flat, bowel sounds positive, no tenderness, or organomegaly  MS: Range of motion and muscle strength intact  Neuro: Alert, CN intact, oriented X 3  Extremities: No cyanosis, clubbing, or edema         Assessment:       1.  Hypertension, unspecified type    2. Hyperlipidemia, unspecified hyperlipidemia type    3. Thrombocytopenia    4. Obesity, unspecified classification, unspecified obesity type, unspecified whether serious comorbidity present    5. Chronic pain of left knee    6. BURT (obstructive sleep apnea)    7. Osteoarthritis, unspecified osteoarthritis type, unspecified site        Plan:       Hypertension, unspecified type    Hyperlipidemia, unspecified hyperlipidemia type    Thrombocytopenia    Obesity, unspecified classification, unspecified obesity type, unspecified whether serious comorbidity present    Chronic pain of left knee    BURT (obstructive sleep apnea)    Osteoarthritis, unspecified osteoarthritis type, unspecified site      lab reviewed all her excellent except HDL 34 can increase with exercise  Redo lab 6 mo CBC,CMP,lipid  Patient to keep appointment December with spinal clinic treat sciatica  Exercise low-sodium low-fat diet decrease weight  No smoking

## 2019-01-31 ENCOUNTER — TELEPHONE (OUTPATIENT)
Dept: SPORTS MEDICINE | Facility: CLINIC | Age: 69
End: 2019-01-31

## 2019-01-31 ENCOUNTER — OFFICE VISIT (OUTPATIENT)
Dept: SPORTS MEDICINE | Facility: CLINIC | Age: 69
End: 2019-01-31
Payer: MEDICARE

## 2019-01-31 ENCOUNTER — HOSPITAL ENCOUNTER (OUTPATIENT)
Dept: RADIOLOGY | Facility: HOSPITAL | Age: 69
Discharge: HOME | End: 2019-01-31
Attending: FAMILY MEDICINE
Payer: MEDICARE

## 2019-01-31 VITALS — WEIGHT: 315 LBS | TEMPERATURE: 97 F | BODY MASS INDEX: 39.17 KG/M2 | HEIGHT: 75 IN

## 2019-01-31 DIAGNOSIS — M79.642 LEFT HAND PAIN: ICD-10-CM

## 2019-01-31 DIAGNOSIS — S69.92XA FINGER INJURY, LEFT, INITIAL ENCOUNTER: Primary | ICD-10-CM

## 2019-01-31 PROCEDURE — 73130 XR HAND COMPLETE 3 VIEW LEFT: ICD-10-PCS | Mod: 26,LT,, | Performed by: RADIOLOGY

## 2019-01-31 PROCEDURE — 73130 X-RAY EXAM OF HAND: CPT | Mod: TC,FY,PO,LT

## 2019-01-31 PROCEDURE — 73130 X-RAY EXAM OF HAND: CPT | Mod: 26,LT,, | Performed by: RADIOLOGY

## 2019-01-31 PROCEDURE — 99999 PR PBB SHADOW E&M-EST. PATIENT-LVL III: ICD-10-PCS | Mod: PBBFAC,,, | Performed by: FAMILY MEDICINE

## 2019-01-31 PROCEDURE — 99213 OFFICE O/P EST LOW 20 MIN: CPT | Mod: PBBFAC,25,PO | Performed by: FAMILY MEDICINE

## 2019-01-31 PROCEDURE — 99999 PR PBB SHADOW E&M-EST. PATIENT-LVL III: CPT | Mod: PBBFAC,,, | Performed by: FAMILY MEDICINE

## 2019-01-31 PROCEDURE — 99214 OFFICE O/P EST MOD 30 MIN: CPT | Mod: S$PBB,,, | Performed by: FAMILY MEDICINE

## 2019-01-31 PROCEDURE — 99214 PR OFFICE/OUTPT VISIT, EST, LEVL IV, 30-39 MIN: ICD-10-PCS | Mod: S$PBB,,, | Performed by: FAMILY MEDICINE

## 2019-01-31 NOTE — TELEPHONE ENCOUNTER
Called to schedule MRI of left hand/fingers with patient. Patient verbally confirmed Mri on 2/6/19 @ 7:30 AM arriving at 7:00AM.     Juaquin Bruno   Sports Medicine Assistant   [facility] [place]

## 2019-01-31 NOTE — PROGRESS NOTES
Shon Asencio, a 68 y.o. male, is here for evaluation of left hand pain.    HISTORY OF PRESENT ILLNESS  Hand dominance: left / ambidex    Location: 4th phalanx DIP hand, left  Onset: acute, 1 week ago  Palliative:    Relative rest   Oral analgesics  Provocative:   ADLs   Gripping, grasping, 4th digit DIP  Prior: none  Progression: worsening discomfort  Quality:   sharp  Radiation: he draws a line of discomfort from distal 4th digit, across palm, and into wrist   Severity: per nursing documentation  Timing: intermittent w/ use  Trauma: 19.01.24: Patient was getting out of his vehicle and as he reached to grasp the hand up top his fingers slipped and he began to feel immediate pain.     Review of systems (ROS):  A 10+ review of systems was performed with pertinent positives and negatives noted above in the history of present illness. Other systems were negative unless otherwise specified.    PHYSICAL EXAMINATION  General:  The patient is alert and oriented x 3. Mood is pleasant. Observation of ears, eyes and nose reveal no gross abnormalities. HEENT: NCAT, sclera anicteric. Lungs: Respirations are equal and unlabored.    LEFT Hand Exam    Observation:     Swelling  mild  Deformity  none   Discoloration  none   Atrophy  none   Scars   none             Erythema                    none    Tenderness:  Radial:  DRUJ    Neg  Radial Styloid   Neg  Radial Shaft                            Neg  1st dorsal Compartment Neg  Manuel's Tubercle  Neg  Basal Joint Thumb  Neg  ECRL Tendon   Neg  FCR Tendon   Neg  Snuff Box   Neg  Lunate    Neg     Ulnar:  ECU Sheath   Neg  FCU Tendon   Neg  Pisiform   Neg  TFCC    Neg  Ulnar Styloid   Neg  Ulnar Shaft   Neg    Hand:  Palmar Fascia   Neg  Metacarpal   Neg  MCP    Neg  Phalanx   Neg  PIP    Neg  DIP    pos  A1- Pulley   Neg  Flexor Tendons  Pos  Finger Tip   Pos         ROM: (AROM)  Right    Left        Wrist Flexion   80°       80°     Wrist Extension   75°      75°  Radial  Deviation  30°     30°   Ulnar Deviation  30°      30°     Pronation   90     90  Supination   90    90    Strength:   RIGHT    LEFT   Wrist Flexion   5/5    5/5   Wrist Extension  5/5    5/5  Hand    5/5    4/5  Opposition   5/5    4/5    Special tests:  Phalens     Neg  Durkan Carpal Compression   Neg  Tinel (wrist)     Neg  Finkelstein     Neg  Piano Tracy (ulnar translation)   Neg  Neal Scaphoid Shift   Neg  Uzair Test     Normal  Froment Sign     Neg  CMC Grind     Neg  [place] (Intrinsic Tightness)   Neg     Extremity Neuro-vascular Testing: Sensation grossly intact to light touch all dermatomal regions. DTR 2+ Biceps, Triceps, BR and Negative Geraldo's sign. Grossly intact motor function at Elbow, Wrist and Hand. Distal pulses radial and ulnar 2+, brisk cap refill, symmetric.    Other Findings:    ASSESSMENT & PLAN   Assessment:   #1 physical exam and x-rays concerning for FDP avulsion injury, 4th digit, left    No evidence of neurologic pathology  No evidence of vascular pathology    Imaging studies reviewed:   X-ray hand, left 19.01    Plan:    Given extreme dysfunction and discomfort, and non-diagnostic x-ray imaging, we will obtain MRI imaging for further evaluation of the soft tissue structures of the associated digit.     Pain management required: handout given  Bracing required:     Digit immobilization splint, continue  Physical therapy required:   Activity (e.g. sports, work) restrictions: no use of digit at this time   school/vocation: retired  and [employer], daughter is AT in [place]    Follow up after MRI digit  Should symptoms worsen or fail to resolve, consider:  Revisiting the above options

## 2019-02-06 ENCOUNTER — HOSPITAL ENCOUNTER (OUTPATIENT)
Dept: RADIOLOGY | Facility: HOSPITAL | Age: 69
Discharge: HOME | End: 2019-02-06
Attending: FAMILY MEDICINE
Payer: MEDICARE

## 2019-02-06 DIAGNOSIS — M79.642 LEFT HAND PAIN: ICD-10-CM

## 2019-02-06 DIAGNOSIS — S69.92XA FINGER INJURY, LEFT, INITIAL ENCOUNTER: ICD-10-CM

## 2019-02-06 PROCEDURE — 73218 MRI UPPER EXTREMITY W/O DYE: CPT | Mod: TC,LT

## 2019-02-06 PROCEDURE — 73218 MRI HAND FINGERS WITHOUT CONTRAST LEFT: ICD-10-PCS | Mod: 26,LT,, | Performed by: RADIOLOGY

## 2019-02-06 PROCEDURE — 73218 MRI UPPER EXTREMITY W/O DYE: CPT | Mod: 26,LT,, | Performed by: RADIOLOGY

## 2019-02-07 ENCOUNTER — OFFICE VISIT (OUTPATIENT)
Dept: SPORTS MEDICINE | Facility: CLINIC | Age: 69
End: 2019-02-07
Payer: MEDICARE

## 2019-02-07 VITALS — TEMPERATURE: 98 F

## 2019-02-07 DIAGNOSIS — M79.645 FINGER PAIN, LEFT: Primary | ICD-10-CM

## 2019-02-07 DIAGNOSIS — S56.119A: ICD-10-CM

## 2019-02-07 PROCEDURE — 99214 PR OFFICE/OUTPT VISIT, EST, LEVL IV, 30-39 MIN: ICD-10-PCS | Mod: S$PBB,,, | Performed by: FAMILY MEDICINE

## 2019-02-07 PROCEDURE — 99999 PR PBB SHADOW E&M-EST. PATIENT-LVL II: CPT | Mod: PBBFAC,,, | Performed by: FAMILY MEDICINE

## 2019-02-07 PROCEDURE — 99999 PR PBB SHADOW E&M-EST. PATIENT-LVL II: ICD-10-PCS | Mod: PBBFAC,,, | Performed by: FAMILY MEDICINE

## 2019-02-07 PROCEDURE — 99214 OFFICE O/P EST MOD 30 MIN: CPT | Mod: S$PBB,,, | Performed by: FAMILY MEDICINE

## 2019-02-07 PROCEDURE — 99212 OFFICE O/P EST SF 10 MIN: CPT | Mod: PBBFAC,PO | Performed by: FAMILY MEDICINE

## 2019-03-20 DIAGNOSIS — Z12.11 COLON CANCER SCREENING: ICD-10-CM

## 2019-05-14 ENCOUNTER — NURSE TRIAGE (OUTPATIENT)
Dept: ADMINISTRATIVE | Facility: CLINIC | Age: 69
End: 2019-05-14

## 2019-05-14 PROBLEM — R07.9 CHEST PAIN: Status: ACTIVE | Noted: 2019-05-14

## 2019-05-14 NOTE — TELEPHONE ENCOUNTER
Reason for Disposition   History of prior 'blood clot' in leg or lungs (i.e., deep vein thrombosis, pulmonary embolism)   Pain also present in shoulder(s) or arm(s) or jaw     Shoulder discomfort only occurs with chest pressure and tightness, and it radiated to mid-back.   Difficulty breathing   Recent long-distance travel with prolonged time in car, bus, plane, or train (i.e., within past 2 weeks; 6 or more hours duration)    Additional Information   Negative: SEVERE chest pain     3 to 7 of 10 when moving or walking at all; becomes worse with walking, and Rip becomes breathless with minimal exertion.    Protocols used: CHEST PAIN-A-OH    Per [facility] triage protocol, recommend ED now for evaluation of chest pressure and tightness present for at least a month. Has gotten much worse in the last few days. Absent of pain when seated, but is 3-7 of 10 with any movement or exertion. He reports pain to his left shoulder when chest pain present, and it also refers to his mid back.  Very short of breath during episodes of CP.  He also has history of DVT.  Recently took a 9 hour car ride to [place] to visit family.  Message to Driss Bradford MD, pcp.  He states his wife will bring him to [facility] ED now.  Please contact caller directly with any additional care advice.  CALL BACK IF:  * Severe chest pain  * Constant chest pain lasting longer than 5 minutes  * Difficulty breathing  * Fever  * You become worse

## 2019-05-15 PROBLEM — R60.0 UNILATERAL EDEMA OF LOWER EXTREMITY: Status: ACTIVE | Noted: 2019-05-15

## 2019-05-23 ENCOUNTER — TELEPHONE (OUTPATIENT)
Dept: ORTHOPEDICS | Facility: CLINIC | Age: 69
End: 2019-05-23

## 2019-05-23 DIAGNOSIS — M51.36 DDD (DEGENERATIVE DISC DISEASE), LUMBAR: Primary | ICD-10-CM

## 2019-06-04 ENCOUNTER — OFFICE VISIT (OUTPATIENT)
Dept: ORTHOPEDICS | Facility: CLINIC | Age: 69
End: 2019-06-04
Payer: MEDICARE

## 2019-06-04 ENCOUNTER — HOSPITAL ENCOUNTER (OUTPATIENT)
Dept: RADIOLOGY | Facility: HOSPITAL | Age: 69
Discharge: HOME | End: 2019-06-04
Attending: PHYSICIAN ASSISTANT
Payer: MEDICARE

## 2019-06-04 VITALS — HEIGHT: 75 IN | BODY MASS INDEX: 39.17 KG/M2 | WEIGHT: 315 LBS

## 2019-06-04 DIAGNOSIS — M54.16 LUMBAR RADICULOPATHY: ICD-10-CM

## 2019-06-04 DIAGNOSIS — M51.36 DDD (DEGENERATIVE DISC DISEASE), LUMBAR: Primary | ICD-10-CM

## 2019-06-04 DIAGNOSIS — M51.36 DDD (DEGENERATIVE DISC DISEASE), LUMBAR: ICD-10-CM

## 2019-06-04 DIAGNOSIS — M43.10 SPONDYLOLISTHESIS, UNSPECIFIED SPINAL REGION: ICD-10-CM

## 2019-06-04 PROCEDURE — 99213 PR OFFICE/OUTPT VISIT, EST, LEVL III, 20-29 MIN: ICD-10-PCS | Mod: S$PBB,,, | Performed by: PHYSICIAN ASSISTANT

## 2019-06-04 PROCEDURE — 72100 X-RAY EXAM L-S SPINE 2/3 VWS: CPT | Mod: 26,,, | Performed by: RADIOLOGY

## 2019-06-04 PROCEDURE — 99213 OFFICE O/P EST LOW 20 MIN: CPT | Mod: PBBFAC,25 | Performed by: PHYSICIAN ASSISTANT

## 2019-06-04 PROCEDURE — 72100 XR LUMBAR SPINE AP AND LAT WITH FLEX/EXT: ICD-10-PCS | Mod: 26,,, | Performed by: RADIOLOGY

## 2019-06-04 PROCEDURE — 99999 PR PBB SHADOW E&M-EST. PATIENT-LVL III: CPT | Mod: PBBFAC,,, | Performed by: PHYSICIAN ASSISTANT

## 2019-06-04 PROCEDURE — 72120 XR LUMBAR SPINE AP AND LAT WITH FLEX/EXT: ICD-10-PCS | Mod: 26,,, | Performed by: RADIOLOGY

## 2019-06-04 PROCEDURE — 72120 X-RAY BEND ONLY L-S SPINE: CPT | Mod: 26,,, | Performed by: RADIOLOGY

## 2019-06-04 PROCEDURE — 72100 X-RAY EXAM L-S SPINE 2/3 VWS: CPT | Mod: TC

## 2019-06-04 PROCEDURE — 99213 OFFICE O/P EST LOW 20 MIN: CPT | Mod: S$PBB,,, | Performed by: PHYSICIAN ASSISTANT

## 2019-06-04 PROCEDURE — 99999 PR PBB SHADOW E&M-EST. PATIENT-LVL III: ICD-10-PCS | Mod: PBBFAC,,, | Performed by: PHYSICIAN ASSISTANT

## 2019-06-04 RX ORDER — TRAMADOL HYDROCHLORIDE 50 MG/1
50 TABLET ORAL EVERY 4 HOURS PRN
Qty: 42 TABLET | Refills: 0 | Status: SHIPPED | OUTPATIENT
Start: 2019-06-04 | End: 2019-06-05

## 2019-06-04 NOTE — PROGRESS NOTES
"DATE: 6/4/2019  PATIENT: Shon Asencio    Attending Physician: Jonathan Patterson M.D.    HISTORY:  Shon Asencio is a 68 y.o. male who returns to me today for follow up of low back pain and occasional right leg pain.  He was last seen by me 11/9/2018.  Today he is doing well but notes his pain is managed with tramadol occasionally for pain.  I have him a week prescription of tramadol in November and it lasted 6 months.  He says this manages his pain very well.    The Patient denies myelopathic symptoms such as handwriting changes or difficulty with buttons/coins/keys. Denies perineal paresthesias, bowel/bladder dysfunction.    PMH/PSH/FamHx/SocHx:  Unchanged from prior visit    ROS:  REVIEW OF SYSTEMS:  Constitution: Negative. Negative for chills, fever and night sweats.   HENT: Negative for congestion and headaches.    Eyes: Negative for blurred vision, left vision loss and right vision loss.   Cardiovascular: Negative for chest pain and syncope.   Respiratory: Negative for cough and shortness of breath.    Endocrine: Negative for polydipsia, polyphagia and polyuria.   Hematologic/Lymphatic: Negative for bleeding problem. Does not bruise/bleed easily.   Skin: Negative for dry skin, itching and rash.   Musculoskeletal: Negative for falls and muscle weakness.   Gastrointestinal: Negative for abdominal pain and bowel incontinence.   Allergic/Immunologic: Negative for hives and persistent infections.  Genitourinary: Negative for urinary retention/incontinence and nocturia.   Neurological: Negative for disturbances in coordination, no myelopathic symptoms such as handwriting changes or difficulty with buttons, coins, keys or small objects. No loss of balance and seizures.   Psychiatric/Behavioral: Negative for depression. The patient does not have insomnia.   Denies perineal paresthesias, bowel or bladder incontinence    EXAM:  Ht 6' 3" (1.905 m)   Wt (!) 183.3 kg (404 lb 1.7 oz)   BMI 50.51 kg/m² "     Physical exam stable.  Neuro exam stable.       IMAGING:  Today I personally reviewed AP, Lat and Flex/Ex  upright L-spine that demonstrate grade I anterolisthesis of L4/5.    MRI lumbar spine from 2017 shows facet arthropathy at L4/5.     Body mass index is 50.51 kg/m².    No results found for: HGBA1C      ASSESSMENT/PLAN:    Shon was seen today for low-back pain and leg pain.    Diagnoses and all orders for this visit:    DDD (degenerative disc disease), lumbar    Lumbar radiculopathy    Spondylolisthesis, unspecified spinal region    Other orders  -     traMADol (ULTRAM) 50 mg tablet; Take 1 tablet (50 mg total) by mouth every 4 (four) hours as needed for Pain.      The patient's pain is tolerable with tramadol occasionally for pain.  A week prescription lasted 6 months last time.  I have refilled his tramadol.  Follow up as needed.       Follow up if symptoms worsen or fail to improve.

## 2019-06-05 ENCOUNTER — OFFICE VISIT (OUTPATIENT)
Dept: PRIMARY CARE CLINIC | Facility: CLINIC | Age: 69
End: 2019-06-05
Payer: MEDICARE

## 2019-06-05 VITALS
BODY MASS INDEX: 39.17 KG/M2 | DIASTOLIC BLOOD PRESSURE: 67 MMHG | SYSTOLIC BLOOD PRESSURE: 103 MMHG | HEART RATE: 71 BPM | HEIGHT: 75 IN | WEIGHT: 315 LBS | OXYGEN SATURATION: 95 % | RESPIRATION RATE: 18 BRPM

## 2019-06-05 DIAGNOSIS — M79.89 RIGHT LEG SWELLING: Primary | ICD-10-CM

## 2019-06-05 DIAGNOSIS — E66.9 OBESITY, UNSPECIFIED CLASSIFICATION, UNSPECIFIED OBESITY TYPE, UNSPECIFIED WHETHER SERIOUS COMORBIDITY PRESENT: ICD-10-CM

## 2019-06-05 DIAGNOSIS — G47.33 OSA (OBSTRUCTIVE SLEEP APNEA): ICD-10-CM

## 2019-06-05 DIAGNOSIS — I10 HYPERTENSION, UNSPECIFIED TYPE: ICD-10-CM

## 2019-06-05 DIAGNOSIS — E78.5 HYPERLIPIDEMIA, UNSPECIFIED HYPERLIPIDEMIA TYPE: ICD-10-CM

## 2019-06-05 PROCEDURE — 99213 OFFICE O/P EST LOW 20 MIN: CPT | Mod: PBBFAC,PN | Performed by: FAMILY MEDICINE

## 2019-06-05 PROCEDURE — 99213 PR OFFICE/OUTPT VISIT, EST, LEVL III, 20-29 MIN: ICD-10-PCS | Mod: S$PBB,,, | Performed by: FAMILY MEDICINE

## 2019-06-05 PROCEDURE — 99213 OFFICE O/P EST LOW 20 MIN: CPT | Mod: S$PBB,,, | Performed by: FAMILY MEDICINE

## 2019-06-05 PROCEDURE — 99999 PR PBB SHADOW E&M-EST. PATIENT-LVL III: ICD-10-PCS | Mod: PBBFAC,,, | Performed by: FAMILY MEDICINE

## 2019-06-05 PROCEDURE — 99999 PR PBB SHADOW E&M-EST. PATIENT-LVL III: CPT | Mod: PBBFAC,,, | Performed by: FAMILY MEDICINE

## 2019-06-05 NOTE — PROGRESS NOTES
Subjective:       Patient ID: Shon Asencio is a 68 y.o. male.    Chief Complaint: Follow-up    HPI: 68-year-old white male---patient seen in the emergency 2 weeks ago chest pain --did angiogram all was fine patient sent home.  Complaining of swelling of both legs---left leg now is almost back to normal right leg has some erythema and continued swelling--going to Dr Fred Alexandre--vascular surgeon for evaluation right leg.  States had a vein in her left leg showed off completely.       Patient mainly in for follow-up visit and wanted my opinion on right leg swelling. Left leg with swelling prior to the surgical procedure about every 2 weeks had no problem with right leg now left leg is not swollen right leg is swelling.      Pt on no fluid pills at this time .        When had angiogram in the hospital had it in the right wrist.  ROS:  Skin: no psoriasis, eczema, skin cancer  HEENT: No headache, ocular pain, blurred vision, diplopia, epistaxis, hoarseness change in voice, thyroid trouble  Lung: No pneumonia, asthma, Tb, wheezing, SOB, smoking quarter pack per day + BURT on CPAP  Heart: No chest pain, ankle edema, palpitations, MI, barby murmur,+ hypertension, +hyperlipidemia +atrial fib-was cardioverted years ago 2001 but recurred immediately   Abdomen: No nausea, vomiting, diarrhea, constipation, ulcers, hepatitis, gallbladder disease, melena, hematochezia, hematemesis  : no UTI, renal disease, stones prostate  MS: no fractures, +O/A knees elbows fingers but does well with Celebrex,no  lupus, rheumatoid, gout .  Now patient has sciatica see history of present  Neuro: No dizziness, LOC, seizures    No diabetes, no anemia, no anxiety, no depression   , 5 children 13 grandson Retired     Objective:   Physical Exam:  General: Well nourished, well developed, no acute distress + obese   Skin: No lesions  HEENT: Eyes PERRLA, EOM intact, nose patent, throat non-erythematous   NECK: Supple, no bruits, No  JVD, no nodes  Lungs: Clear, no rales, rhonchi, wheezing  Heart: Regular rate and rhythm, no murmurs, gallops, or rubs  Abdomen: flat, bowel sounds positive, no tenderness, or organomegaly  MS: Range of motion and muscle strength intact  Neuro: Alert, CN intact, oriented X 3  Extremities: No cyanosis, clubbing, 1/4 pitti edema the upper lower leg and 2/4 pitting edema of the lower leg--left leg appears to be normal with minimal edema negative Homans negative calf tenderness        Assessment:       1. Right leg swelling    2. Hypertension, unspecified type    3. Hyperlipidemia, unspecified hyperlipidemia type    4. Obesity, unspecified classification, unspecified obesity type, unspecified whether serious comorbidity present    5. BURT (obstructive sleep apnea)        Plan:       Right leg swelling    Hypertension, unspecified type    Hyperlipidemia, unspecified hyperlipidemia type    Obesity, unspecified classification, unspecified obesity type, unspecified whether serious comorbidity present    BURT (obstructive sleep apnea)      recently in hospital had angiogram which was negative to evaluate chest pain--chest pain now resolved  Right leg swelling--initially had bilateral swelling of the lower legs now only the right leg--mild erythema to the right lower leg pitting edema 1/4 upper lower leg 2/4 lower lower leg patient has appointment with vascular surgeon at 2:00 p.m. Today--told best to get a femoral venous ultrasound even no patient has negative Homans negative calf tenderness--if negative would treat with Keflex 500 t.i.d. times 10 days due to the cellulitis of the lower leg which can be secondary to stasis dermatitis but will treat empirically-- and Lasix 40 q.a.m. and Micro-K 10 q.a.m. for 7 days if edema resolves can use every other day for a week if still down use p.r.n.  Low-sodium low-fat diet decrease weight  Once wel needs exercise    Redo lab 6 mo CBC,CMP,lipid  Patient to keep appointment December  with spinal clinic treat sciatica  Exercise low-sodium low-fat diet decrease weight  Patient quit smoking 1 month ago

## 2019-06-07 NOTE — H&P
Shon Asencio  is here for a completion of his perioperative paperwork. he  Is scheduled to undergo    left   1. Arthroscopic partial meniscectomy  2. Possible arthroscopic synovectomy  3. Possible arthroscopic loose body removal  4. Possible arthroscopic chondroplasty  on 3/22/17.      He is a healthy individual and does need clearance for this procedure, which he has received from his outside PCP and Cardiologist which I saw in person. He will bring this to [facility].     Risks, indications and benefits of the surgical procedure were discussed with the patient. All questions with regard to surgery, rehab, expected return to functional activities, activities of daily living and recreational endeavors were answered to his satisfaction.    Once no other questions were asked, a brief history and physical exam was then performed.      PHYSICAL EXAM:  GEN: A&Ox3, WD WN NAD  HEENT: WNL  CHEST: CTAB, no W/R/R  HEART: RRR, no M/R/G  ABD: Soft, NT ND, BS x4 QUADS  MS; See Epic  NEURO: CN II-XII intact       The surgical consent was then reviewed with the patient, who agreed with all the contents of the consent form and it was signed. he was then given the [facility] surgery packet to bring with him to [facility] for the anesthesia portion of his perioperative paperwork.   For all physicians except for Dr. Lopez, we will email and possibly fax the consent forms and booking sheets to [facility] pre-admit.    PHYSICAL THERAPY:  He was also instructed regarding physical therapy and will begin on  POD3-5. He was given a copy of the original prescription to schedule. Another copy of this prescription was given to the Patient to bring to his PT place in [place].    POST OP CARE:instructions were reviewed including care of the wound and dressing after surgery and when he can shower.     PAIN MANAGEMENT: Shon Asencio was also given his pain management regime, which includes the TENS unit which he refused from  [facility]. He was also instructed regarding the Polar ice unit that will be in place after surgery and his postoperative pain medications.     PAIN MEDICATION:  Percocet 10/325mg 1 po q 4-6 hours prn pain  Ultram 50 mg one p.o. q.4-6 hours p.r.n. breakthrough pain,   Phenergan 25 mg one p.o. q.4-6 hours p.r.n. nausea and vomiting.    The patient will stop his blood thinner today and result start it after surgery to cover this DVT prophylaxis.     As there were no other questions to be asked, he was given my business card along with Aris Lopez MD business card if he has any questions or concerns prior to surgery or in the postop period.      yes

## 2019-09-11 ENCOUNTER — OFFICE VISIT (OUTPATIENT)
Dept: PRIMARY CARE CLINIC | Facility: CLINIC | Age: 69
End: 2019-09-11
Payer: MEDICARE

## 2019-09-11 VITALS
HEART RATE: 69 BPM | HEIGHT: 75 IN | RESPIRATION RATE: 17 BRPM | DIASTOLIC BLOOD PRESSURE: 64 MMHG | BODY MASS INDEX: 39.17 KG/M2 | OXYGEN SATURATION: 93 % | SYSTOLIC BLOOD PRESSURE: 102 MMHG | WEIGHT: 315 LBS | TEMPERATURE: 97 F

## 2019-09-11 DIAGNOSIS — R60.0 UNILATERAL EDEMA OF LOWER EXTREMITY: Primary | ICD-10-CM

## 2019-09-11 DIAGNOSIS — E78.5 HYPERLIPIDEMIA, UNSPECIFIED HYPERLIPIDEMIA TYPE: ICD-10-CM

## 2019-09-11 DIAGNOSIS — M19.90 OSTEOARTHRITIS, UNSPECIFIED OSTEOARTHRITIS TYPE, UNSPECIFIED SITE: ICD-10-CM

## 2019-09-11 DIAGNOSIS — G47.33 OSA (OBSTRUCTIVE SLEEP APNEA): ICD-10-CM

## 2019-09-11 DIAGNOSIS — I10 HYPERTENSION, UNSPECIFIED TYPE: ICD-10-CM

## 2019-09-11 DIAGNOSIS — E66.9 OBESITY, UNSPECIFIED CLASSIFICATION, UNSPECIFIED OBESITY TYPE, UNSPECIFIED WHETHER SERIOUS COMORBIDITY PRESENT: ICD-10-CM

## 2019-09-11 PROCEDURE — 99213 OFFICE O/P EST LOW 20 MIN: CPT | Mod: PBBFAC,PN | Performed by: FAMILY MEDICINE

## 2019-09-11 PROCEDURE — 99213 OFFICE O/P EST LOW 20 MIN: CPT | Mod: S$PBB,,, | Performed by: FAMILY MEDICINE

## 2019-09-11 PROCEDURE — 99999 PR PBB SHADOW E&M-EST. PATIENT-LVL III: CPT | Mod: PBBFAC,,, | Performed by: FAMILY MEDICINE

## 2019-09-11 PROCEDURE — 99999 PR PBB SHADOW E&M-EST. PATIENT-LVL III: ICD-10-PCS | Mod: PBBFAC,,, | Performed by: FAMILY MEDICINE

## 2019-09-11 PROCEDURE — 99213 PR OFFICE/OUTPT VISIT, EST, LEVL III, 20-29 MIN: ICD-10-PCS | Mod: S$PBB,,, | Performed by: FAMILY MEDICINE

## 2019-09-11 RX ORDER — FUROSEMIDE 40 MG/1
TABLET ORAL
Qty: 30 TABLET | Refills: 5 | Status: SHIPPED | OUTPATIENT
Start: 2019-09-11 | End: 2020-02-27

## 2019-09-11 RX ORDER — CEPHALEXIN 500 MG/1
500 CAPSULE ORAL EVERY 8 HOURS
Qty: 30 CAPSULE | Refills: 0 | Status: SHIPPED | OUTPATIENT
Start: 2019-09-11 | End: 2020-02-27

## 2019-09-11 RX ORDER — MUPIROCIN 20 MG/G
OINTMENT TOPICAL 3 TIMES DAILY
Qty: 22 G | Refills: 1 | Status: SHIPPED | OUTPATIENT
Start: 2019-09-11 | End: 2020-02-27

## 2019-09-11 RX ORDER — DABIGATRAN ETEXILATE MESYLATE 150 MG/1
150 CAPSULE ORAL 2 TIMES DAILY
COMMUNITY
Start: 2019-07-26 | End: 2019-12-10 | Stop reason: SDUPTHER

## 2019-09-11 RX ORDER — POTASSIUM CHLORIDE 750 MG/1
CAPSULE, EXTENDED RELEASE ORAL
Qty: 30 CAPSULE | Refills: 5 | Status: SHIPPED | OUTPATIENT
Start: 2019-09-11 | End: 2020-02-27

## 2019-09-11 NOTE — PROGRESS NOTES
Subjective:       Patient ID: Shon Asencio is a 69 y.o. male.    Chief Complaint: Leg Swelling (right)    HPI: 70 yo WM c/o --leg swelling--Dr Rodriguez in [place]--pt had 2 veins blocked in legs bilaterally. Due varicose viens. Done 2 months ago-- left leg doing well--right leg swells intermittently.  Patient states occasionally has yellow discharge coming out of 3 scabs 2 on the posterior leg 1 on the anterior shin.   ROS:   Skin: no psoriasis, eczema, skin cancer-see history of present illness  HEENT: No headache, ocular pain, blurred vision, diplopia, epistaxis, hoarseness change in voice, thyroid trouble  Lung: No pneumonia, asthma, Tb, wheezing, SOB, smoking quarter pack per day + BURT on CPAP   Heart: No chest pain, ankle edema, palpitations, MI, barby murmur,+ hypertension, +hyperlipidemia +atrial fib-was cardioverted years ago 2001 but recurred immediately   Abdomen: No nausea, vomiting, diarrhea, constipation, ulcers, hepatitis, gallbladder disease, melena, hematochezia, hematemesis  : no UTI, renal disease, stones prostate  MS: no fractures, +O/A knees elbows fingers but does well with Celebrex,no  lupus, rheumatoid, gout .  Now patient has sciatica see history of present  Neuro: No dizziness, LOC, seizures    No diabetes, no anemia, no anxiety, no depression   , 5 children 13 grandson Retired     Objective:   Physical Exam:  General: Well nourished, well developed, no acute distress + obese   Skin:  3 scabs on the posterior right leg eg 2 larger scabs on the anterior right leg--edema right leg +1-2/4 edema left leg 0-1/4  HEENT: Eyes PERRLA, EOM intact, nose patent, throat non-erythematous   NECK: Supple, no bruits, No JVD, no nodes  Lungs: Clear, no rales, rhonchi, wheezing  Heart: Regular rate and rhythm, no murmurs, gallops, or rubs  Abdomen: flat, bowel sounds positive, no tenderness, or organomegaly  MS: Range of motion and muscle strength intact  Neuro: Alert, CN intact, oriented X  3  Extremities: No cyanosis, clubbing, see skin above fluid appear      Assessment:       1. Unilateral edema of lower extremity    2. BURT (obstructive sleep apnea)    3. Osteoarthritis, unspecified osteoarthritis type, unspecified site    4. Obesity, unspecified classification, unspecified obesity type, unspecified whether serious comorbidity present    5. Hypertension, unspecified type    6. Hyperlipidemia, unspecified hyperlipidemia type        Plan:       Unilateral edema of lower extremity    BURT (obstructive sleep apnea)    Osteoarthritis, unspecified osteoarthritis type, unspecified site    Obesity, unspecified classification, unspecified obesity type, unspecified whether serious comorbidity present    Hypertension, unspecified type    Hyperlipidemia, unspecified hyperlipidemia type        Right leg swelling--recent surgery on varicose veins bilaterally--after surgery little left leg is done extremely well--right leg with episodes of edema--has scabs on the right leg 2 large anterior scabs and 3 small posterior scab--occasionally scabs leak yellow serous sanguinous fluid that runs down the legs probably secondary to the peripheral edema--will treat with Keflex 500 t.i.d. times 10 days/Bactroban ointment to the scabs cover with a Band-Aid in the morning take off at night but apply Bactroban b.i.d.  Limit fluid 2000 cc 1 right leg is swollen--Lasix 40 mg 1 p.o. q.a.m. as long as right leg swollen and can decrease to q.o.d. 0 Q 3 days Micro-K 10 1 p.o. q.d. with each Lasix--elevate feet for 20 min above the heart b.i.d.--patient may need surgical stocking for the right leg---scabs an yellow drainage appear to be secondary to peripheral edema  Low-sodium low-fat diet decrease weight     Redo lab 6 mo CBC,CMP,lipid

## 2019-09-19 ENCOUNTER — TELEPHONE (OUTPATIENT)
Dept: PRIMARY CARE CLINIC | Facility: CLINIC | Age: 69
End: 2019-09-19

## 2019-09-19 NOTE — TELEPHONE ENCOUNTER
----- Message from Jamee Painter sent at 9/19/2019  8:16 AM CDT -----  Contact: SELF/ [phone]  Patient thinks he has kidney stones and need a MRI and because the patient is overweight he will need to go to the main campus. Please call and advise, patient is in a lot of pain.

## 2019-09-19 NOTE — TELEPHONE ENCOUNTER
----- Message from Dena Espinosa sent at 9/19/2019  9:13 AM CDT -----  Contact: Pt self Mobile/Home [phone]   Patient would like a call back in regards to him wanting to speak with you about him having a kidney stone and he would like to put in an order for MRI and have it done at the [facility] location.

## 2019-09-19 NOTE — TELEPHONE ENCOUNTER
----- Message from Shantell Diaz sent at 9/19/2019  9:24 AM CDT -----  Contact: [phone]  Patient is requesting a MRI for his kidney stone. He is asking if he can have a MRI at the main campus.  Please advise, thanks

## 2019-09-20 ENCOUNTER — OFFICE VISIT (OUTPATIENT)
Dept: ORTHOPEDICS | Facility: CLINIC | Age: 69
End: 2019-09-20
Payer: MEDICARE

## 2019-09-20 VITALS — HEIGHT: 75 IN | WEIGHT: 315 LBS | BODY MASS INDEX: 39.17 KG/M2

## 2019-09-20 DIAGNOSIS — M54.50 ACUTE LEFT-SIDED LOW BACK PAIN WITHOUT SCIATICA: ICD-10-CM

## 2019-09-20 DIAGNOSIS — M51.36 DDD (DEGENERATIVE DISC DISEASE), LUMBAR: Primary | ICD-10-CM

## 2019-09-20 PROCEDURE — 99213 PR OFFICE/OUTPT VISIT, EST, LEVL III, 20-29 MIN: ICD-10-PCS | Mod: S$PBB,,, | Performed by: PHYSICIAN ASSISTANT

## 2019-09-20 PROCEDURE — 99213 OFFICE O/P EST LOW 20 MIN: CPT | Mod: PBBFAC | Performed by: PHYSICIAN ASSISTANT

## 2019-09-20 PROCEDURE — 99213 OFFICE O/P EST LOW 20 MIN: CPT | Mod: S$PBB,,, | Performed by: PHYSICIAN ASSISTANT

## 2019-09-20 PROCEDURE — 99999 PR PBB SHADOW E&M-EST. PATIENT-LVL III: CPT | Mod: PBBFAC,,, | Performed by: PHYSICIAN ASSISTANT

## 2019-09-20 PROCEDURE — 99999 PR PBB SHADOW E&M-EST. PATIENT-LVL III: ICD-10-PCS | Mod: PBBFAC,,, | Performed by: PHYSICIAN ASSISTANT

## 2019-09-20 RX ORDER — TRAMADOL HYDROCHLORIDE 50 MG/1
50 TABLET ORAL EVERY 4 HOURS PRN
Qty: 42 TABLET | Refills: 0 | Status: SHIPPED | OUTPATIENT
Start: 2019-09-20 | End: 2019-09-30

## 2019-09-20 NOTE — PROGRESS NOTES
"DATE: 9/20/2019  PATIENT: Shon Asencio    Attending Physician: Jonathan Patterson M.D.    HISTORY:  Shon Asencio is a 69 y.o. male who returns to me today for follow up of back pain.  He was last seen by me 6/4/2019.  Today he is doing well but notes he went to the ED yesterday thinking he had kidney stones.  They told him he had sciatica and to follow up with me.  He thinks it is his kidney.  He says he started the keto diet a couple weeks ago and he didn't have any pain until he started the diet.      The Patient denies myelopathic symptoms such as handwriting changes or difficulty with buttons/coins/keys. Denies perineal paresthesias, bowel/bladder dysfunction.    PMH/PSH/FamHx/SocHx:  Unchanged from prior visit    ROS:  REVIEW OF SYSTEMS:  Constitution: Negative. Negative for chills, fever and night sweats.   HENT: Negative for congestion and headaches.    Eyes: Negative for blurred vision, left vision loss and right vision loss.   Cardiovascular: Negative for chest pain and syncope.   Respiratory: Negative for cough and shortness of breath.    Endocrine: Negative for polydipsia, polyphagia and polyuria.   Hematologic/Lymphatic: Negative for bleeding problem. Does not bruise/bleed easily.   Skin: Negative for dry skin, itching and rash.   Musculoskeletal: Negative for falls and muscle weakness.   Gastrointestinal: Negative for abdominal pain and bowel incontinence.   Allergic/Immunologic: Negative for hives and persistent infections.  Genitourinary: Negative for urinary retention/incontinence and nocturia.   Neurological: Negative for disturbances in coordination, no myelopathic symptoms such as handwriting changes or difficulty with buttons, coins, keys or small objects. No loss of balance and seizures.   Psychiatric/Behavioral: Negative for depression. The patient does not have insomnia.   Denies perineal paresthesias, bowel or bladder incontinence    EXAM:  Ht 6' 3" (1.905 m)   Wt (!) 184 kg " (405 lb 10.3 oz)   BMI 50.70 kg/m²     My physical examination was notable for the following findings:     Normal station and gait, no difficulty with toe or heel walk.   Dorsal lumbar skin negative for rashes, lesions, hairy patches and surgical scars. There is mild left CVA tenderness to palpation.  Lumbar range of motion is acceptable.  Global saggital and coronal spinal balance acceptable, not significant for scoliosis and kyphosis.  No pain with the range of motion of the bilateral hips. No trochanteric tenderness to palpation.  Bilateral lower extremities warm and well perfused, dorsalis pedis pulses 2+ bilaterally.  Normal strength and tone in all major motor groups in the bilateral lower extremities. Normal sensation to light touch in the L2-S1 dermatomes bilaterally.  Deep tendon reflexes symmetric 2+ in the bilateral lower extremities.  Negative Babinski bilaterally. Straight leg raise negative bilaterally.      IMAGING:  No new imaging today.    Today I personally re- reviewed AP, Lat and Flex/Ex  upright L-spine that demonstrate grade I anterolisthesis of L4/5 and mild to moderate degenerative changes throughout the lumbar spine.       Body mass index is 50.7 kg/m².    No results found for: HGBA1C      ASSESSMENT/PLAN:    Shon was seen today for follow-up.    Diagnoses and all orders for this visit:    DDD (degenerative disc disease), lumbar    Acute left-sided low back pain without sciatica    Other orders  -     traMADol (ULTRAM) 50 mg tablet; Take 1 tablet (50 mg total) by mouth every 4 (four) hours as needed.      The patient is having left flank pain.  We discussed getting an MRI lumbar spine.  He would like to give it a week and see if he improves.  He will drink plenty of fluids and discontinue the keto diet.  He will follow up with me if symptoms persist.  We may consider an MRI at that time.     Follow up if symptoms worsen or fail to improve.

## 2019-10-06 ENCOUNTER — PATIENT OUTREACH (OUTPATIENT)
Dept: ADMINISTRATIVE | Facility: HOSPITAL | Age: 69
End: 2019-10-06

## 2019-10-06 DIAGNOSIS — Z11.59 NEED FOR HEPATITIS C SCREENING TEST: Primary | ICD-10-CM

## 2019-10-06 NOTE — PROGRESS NOTES
CE, [facility], and chart review.   Colonoscopy report uploaded to  per [facility].    updated.

## 2019-10-09 DIAGNOSIS — M51.36 DDD (DEGENERATIVE DISC DISEASE), LUMBAR: Primary | ICD-10-CM

## 2019-10-09 DIAGNOSIS — M54.16 LUMBAR RADICULOPATHY: ICD-10-CM

## 2019-10-17 ENCOUNTER — TELEPHONE (OUTPATIENT)
Dept: ORTHOPEDICS | Facility: CLINIC | Age: 69
End: 2019-10-17

## 2019-10-17 ENCOUNTER — HOSPITAL ENCOUNTER (OUTPATIENT)
Dept: RADIOLOGY | Facility: HOSPITAL | Age: 69
Discharge: HOME | End: 2019-10-17
Attending: PHYSICIAN ASSISTANT
Payer: MEDICARE

## 2019-10-17 DIAGNOSIS — M51.36 DDD (DEGENERATIVE DISC DISEASE), LUMBAR: ICD-10-CM

## 2019-10-17 DIAGNOSIS — M54.16 LUMBAR RADICULOPATHY: ICD-10-CM

## 2019-10-17 PROCEDURE — 72148 MRI LUMBAR SPINE W/O DYE: CPT | Mod: TC

## 2019-10-17 PROCEDURE — 72148 MRI LUMBAR SPINE WITHOUT CONTRAST: ICD-10-PCS | Mod: 26,,, | Performed by: RADIOLOGY

## 2019-10-17 PROCEDURE — 72148 MRI LUMBAR SPINE W/O DYE: CPT | Mod: 26,,, | Performed by: RADIOLOGY

## 2019-10-17 NOTE — TELEPHONE ENCOUNTER
----- Message from Cameron Romero sent at 10/17/2019  2:49 PM CDT -----  Contact: Pt  Type:  Needs Medical Advice    Who Called: The Pt would like a call back about the MRI he did today and would like to discuss this with you.    Best Call Back Number: [phone]

## 2019-10-19 ENCOUNTER — PATIENT OUTREACH (OUTPATIENT)
Dept: ADMINISTRATIVE | Facility: OTHER | Age: 69
End: 2019-10-19

## 2019-10-22 ENCOUNTER — OFFICE VISIT (OUTPATIENT)
Dept: SPINE | Facility: CLINIC | Age: 69
End: 2019-10-22
Payer: MEDICARE

## 2019-10-22 VITALS — BODY MASS INDEX: 39.17 KG/M2 | HEIGHT: 75 IN | WEIGHT: 315 LBS

## 2019-10-22 DIAGNOSIS — M43.16 SPONDYLOLISTHESIS OF LUMBAR REGION: ICD-10-CM

## 2019-10-22 DIAGNOSIS — M51.36 DDD (DEGENERATIVE DISC DISEASE), LUMBAR: Primary | ICD-10-CM

## 2019-10-22 PROCEDURE — 99999 PR PBB SHADOW E&M-EST. PATIENT-LVL III: ICD-10-PCS | Mod: PBBFAC,,, | Performed by: PHYSICIAN ASSISTANT

## 2019-10-22 PROCEDURE — 99213 OFFICE O/P EST LOW 20 MIN: CPT | Mod: S$PBB,,, | Performed by: PHYSICIAN ASSISTANT

## 2019-10-22 PROCEDURE — 99999 PR PBB SHADOW E&M-EST. PATIENT-LVL III: CPT | Mod: PBBFAC,,, | Performed by: PHYSICIAN ASSISTANT

## 2019-10-22 PROCEDURE — 99213 PR OFFICE/OUTPT VISIT, EST, LEVL III, 20-29 MIN: ICD-10-PCS | Mod: S$PBB,,, | Performed by: PHYSICIAN ASSISTANT

## 2019-10-22 PROCEDURE — 99213 OFFICE O/P EST LOW 20 MIN: CPT | Mod: PBBFAC,PN | Performed by: PHYSICIAN ASSISTANT

## 2019-10-22 NOTE — H&P (VIEW-ONLY)
"DATE: 10/22/2019  PATIENT: Shon Asencio    Attending Physician: Jonathan Patterson M.D.    HISTORY:  Shon Asencio is a 69 y.o. male who returns to me today for follow up of low back pain.  He was last seen by me 9/20/2019.  Today he is doing well but notes he continues to have about a 2-3/10 low back pain daily.  He is taking celebrex daily.     The Patient denies myelopathic symptoms such as handwriting changes or difficulty with buttons/coins/keys. Denies perineal paresthesias, bowel/bladder dysfunction.    PMH/PSH/FamHx/SocHx:  Unchanged from prior visit    ROS:  REVIEW OF SYSTEMS:  Constitution: Negative. Negative for chills, fever and night sweats.   HENT: Negative for congestion and headaches.    Eyes: Negative for blurred vision, left vision loss and right vision loss.   Cardiovascular: Negative for chest pain and syncope.   Respiratory: Negative for cough and shortness of breath.    Endocrine: Negative for polydipsia, polyphagia and polyuria.   Hematologic/Lymphatic: Negative for bleeding problem. Does not bruise/bleed easily.   Skin: Negative for dry skin, itching and rash.   Musculoskeletal: Negative for falls and muscle weakness.   Gastrointestinal: Negative for abdominal pain and bowel incontinence.   Allergic/Immunologic: Negative for hives and persistent infections.  Genitourinary: Negative for urinary retention/incontinence and nocturia.   Neurological: Negative for disturbances in coordination, no myelopathic symptoms such as handwriting changes or difficulty with buttons, coins, keys or small objects. No loss of balance and seizures.   Psychiatric/Behavioral: Negative for depression. The patient does not have insomnia.   Denies perineal paresthesias, bowel or bladder incontinence    EXAM:  Ht 6' 3" (1.905 m)   Wt (!) 182 kg (401 lb 3.8 oz)   BMI 50.15 kg/m²     Physical exam stable.  Neuro exam stable.       IMAGING:  No new imaging today.    Today I personally re- reviewed AP, Lat " and Flex/Ex  upright L-spine that demonstrate grade I anterolisthesis of L4/5 and mild to moderate degenerative changes throughout the lumbar spine.     MRI lumbar spine demonstrates moderate facet arthropathy at L4/5.     Body mass index is 50.15 kg/m².    No results found for: HGBA1C      ASSESSMENT/PLAN:    Shon was seen today for follow-up.    Diagnoses and all orders for this visit:    DDD (degenerative disc disease), lumbar  -     Procedure Order to [facility]; Future    Spondylolisthesis of lumbar region  -     Procedure Order to [facility]; Future      Today we discussed at length all of the different treatment options including anti-inflammatories, acetaminophen, rest, ice, heat, physical therapy including strengthening and stretching exercises, home exercises, ROM, aerobic conditioning, aqua therapy, other modalities including ultrasound, massage, and dry needling, epidural steroid injections and finally surgical intervention.      The patient does not want to go to physical therapy at this time.  He would like to try an injection.  Orders placed for facet injection today.  Follow up 2 weeks after injection.      Follow up if symptoms worsen or fail to improve.

## 2019-10-22 NOTE — PROGRESS NOTES
"DATE: 10/22/2019  PATIENT: Shon Asencio    Attending Physician: Jonathan Patterson M.D.    HISTORY:  Shon Asencio is a 69 y.o. male who returns to me today for follow up of low back pain.  He was last seen by me 9/20/2019.  Today he is doing well but notes he continues to have about a 2-3/10 low back pain daily.  He is taking celebrex daily.     The Patient denies myelopathic symptoms such as handwriting changes or difficulty with buttons/coins/keys. Denies perineal paresthesias, bowel/bladder dysfunction.    PMH/PSH/FamHx/SocHx:  Unchanged from prior visit    ROS:  REVIEW OF SYSTEMS:  Constitution: Negative. Negative for chills, fever and night sweats.   HENT: Negative for congestion and headaches.    Eyes: Negative for blurred vision, left vision loss and right vision loss.   Cardiovascular: Negative for chest pain and syncope.   Respiratory: Negative for cough and shortness of breath.    Endocrine: Negative for polydipsia, polyphagia and polyuria.   Hematologic/Lymphatic: Negative for bleeding problem. Does not bruise/bleed easily.   Skin: Negative for dry skin, itching and rash.   Musculoskeletal: Negative for falls and muscle weakness.   Gastrointestinal: Negative for abdominal pain and bowel incontinence.   Allergic/Immunologic: Negative for hives and persistent infections.  Genitourinary: Negative for urinary retention/incontinence and nocturia.   Neurological: Negative for disturbances in coordination, no myelopathic symptoms such as handwriting changes or difficulty with buttons, coins, keys or small objects. No loss of balance and seizures.   Psychiatric/Behavioral: Negative for depression. The patient does not have insomnia.   Denies perineal paresthesias, bowel or bladder incontinence    EXAM:  Ht 6' 3" (1.905 m)   Wt (!) 182 kg (401 lb 3.8 oz)   BMI 50.15 kg/m²     Physical exam stable.  Neuro exam stable.       IMAGING:  No new imaging today.    Today I personally re- reviewed AP, Lat " and Flex/Ex  upright L-spine that demonstrate grade I anterolisthesis of L4/5 and mild to moderate degenerative changes throughout the lumbar spine.     MRI lumbar spine demonstrates moderate facet arthropathy at L4/5.     Body mass index is 50.15 kg/m².    No results found for: HGBA1C      ASSESSMENT/PLAN:    Shon was seen today for follow-up.    Diagnoses and all orders for this visit:    DDD (degenerative disc disease), lumbar  -     Procedure Order to [facility]; Future    Spondylolisthesis of lumbar region  -     Procedure Order to [facility]; Future      Today we discussed at length all of the different treatment options including anti-inflammatories, acetaminophen, rest, ice, heat, physical therapy including strengthening and stretching exercises, home exercises, ROM, aerobic conditioning, aqua therapy, other modalities including ultrasound, massage, and dry needling, epidural steroid injections and finally surgical intervention.      The patient does not want to go to physical therapy at this time.  He would like to try an injection.  Orders placed for facet injection today.  Follow up 2 weeks after injection.      Follow up if symptoms worsen or fail to improve.

## 2019-10-23 ENCOUNTER — TELEPHONE (OUTPATIENT)
Dept: PAIN MEDICINE | Facility: CLINIC | Age: 69
End: 2019-10-23

## 2019-10-23 NOTE — TELEPHONE ENCOUNTER
Attempted to contact patient to schedule procedure, unable to leave message due to voice mailbox full.

## 2019-10-24 ENCOUNTER — TELEPHONE (OUTPATIENT)
Dept: PAIN MEDICINE | Facility: CLINIC | Age: 69
End: 2019-10-24

## 2019-10-24 DIAGNOSIS — M51.36 DDD (DEGENERATIVE DISC DISEASE), LUMBAR: Primary | ICD-10-CM

## 2019-10-24 NOTE — TELEPHONE ENCOUNTER
Patient returned call to schedule procedure. Date, time, and instructions given and mailed. Patient verbalized understanding.

## 2019-11-11 ENCOUNTER — HOSPITAL ENCOUNTER (OUTPATIENT)
Facility: OTHER | Age: 69
Discharge: HOME | End: 2019-11-11
Attending: ANESTHESIOLOGY | Admitting: ANESTHESIOLOGY
Payer: MEDICARE

## 2019-11-11 VITALS
RESPIRATION RATE: 16 BRPM | HEART RATE: 90 BPM | DIASTOLIC BLOOD PRESSURE: 79 MMHG | WEIGHT: 315 LBS | OXYGEN SATURATION: 96 % | BODY MASS INDEX: 39.17 KG/M2 | HEIGHT: 75 IN | TEMPERATURE: 98 F | SYSTOLIC BLOOD PRESSURE: 135 MMHG

## 2019-11-11 DIAGNOSIS — M47.816 LUMBAR SPONDYLOSIS: ICD-10-CM

## 2019-11-11 DIAGNOSIS — M54.16 LUMBAR RADICULOPATHY: Primary | ICD-10-CM

## 2019-11-11 PROCEDURE — 64493 PR INJ DX/THER AGNT PARAVERT FACET JOINT,IMG GUIDE,LUMBAR/SAC,1ST LVL: ICD-10-PCS | Mod: 50,,, | Performed by: ANESTHESIOLOGY

## 2019-11-11 PROCEDURE — 64493 INJ PARAVERT F JNT L/S 1 LEV: CPT | Mod: 50,,, | Performed by: ANESTHESIOLOGY

## 2019-11-11 PROCEDURE — 25000003 PHARM REV CODE 250: Performed by: ANESTHESIOLOGY

## 2019-11-11 PROCEDURE — 64493 INJ PARAVERT F JNT L/S 1 LEV: CPT | Mod: 50 | Performed by: ANESTHESIOLOGY

## 2019-11-11 PROCEDURE — 63600175 PHARM REV CODE 636 W HCPCS: Performed by: ANESTHESIOLOGY

## 2019-11-11 PROCEDURE — 63600175 PHARM REV CODE 636 W HCPCS: Performed by: PHYSICAL MEDICINE & REHABILITATION

## 2019-11-11 PROCEDURE — 99152 MOD SED SAME PHYS/QHP 5/>YRS: CPT | Mod: ,,, | Performed by: ANESTHESIOLOGY

## 2019-11-11 PROCEDURE — 25500020 PHARM REV CODE 255: Performed by: ANESTHESIOLOGY

## 2019-11-11 PROCEDURE — 99152 PR MOD CONSCIOUS SEDATION, SAME PHYS, 5+ YRS, FIRST 15 MIN: ICD-10-PCS | Mod: ,,, | Performed by: ANESTHESIOLOGY

## 2019-11-11 RX ORDER — FENTANYL CITRATE 50 UG/ML
INJECTION, SOLUTION INTRAMUSCULAR; INTRAVENOUS
Status: DISCONTINUED | OUTPATIENT
Start: 2019-11-11 | End: 2019-11-11 | Stop reason: HOSPADM

## 2019-11-11 RX ORDER — DEXAMETHASONE SODIUM PHOSPHATE 4 MG/ML
INJECTION, SOLUTION INTRA-ARTICULAR; INTRALESIONAL; INTRAMUSCULAR; INTRAVENOUS; SOFT TISSUE
Status: DISCONTINUED | OUTPATIENT
Start: 2019-11-11 | End: 2019-11-11 | Stop reason: HOSPADM

## 2019-11-11 RX ORDER — BUPIVACAINE HYDROCHLORIDE 2.5 MG/ML
INJECTION, SOLUTION EPIDURAL; INFILTRATION; INTRACAUDAL
Status: DISCONTINUED | OUTPATIENT
Start: 2019-11-11 | End: 2019-11-11 | Stop reason: HOSPADM

## 2019-11-11 RX ORDER — LIDOCAINE HYDROCHLORIDE 10 MG/ML
INJECTION INFILTRATION; PERINEURAL
Status: DISCONTINUED | OUTPATIENT
Start: 2019-11-11 | End: 2019-11-11 | Stop reason: HOSPADM

## 2019-11-11 RX ORDER — MIDAZOLAM HYDROCHLORIDE 1 MG/ML
INJECTION INTRAMUSCULAR; INTRAVENOUS
Status: DISCONTINUED | OUTPATIENT
Start: 2019-11-11 | End: 2019-11-11 | Stop reason: HOSPADM

## 2019-11-11 RX ORDER — SODIUM CHLORIDE 9 MG/ML
500 INJECTION, SOLUTION INTRAVENOUS CONTINUOUS
Status: DISCONTINUED | OUTPATIENT
Start: 2019-11-11 | End: 2019-11-11 | Stop reason: HOSPADM

## 2019-11-11 RX ADMIN — SODIUM CHLORIDE 500 ML: 0.9 INJECTION, SOLUTION INTRAVENOUS at 11:11

## 2019-11-11 NOTE — DISCHARGE SUMMARY
Discharge Note  Short Stay      SUMMARY     Admit Date: 11/11/2019    Attending Physician: Trevin Root      Discharge Physician: Trevin Root      Discharge Date: 11/11/2019 11:47 AM    Procedure(s) (LRB):  FACET JOINT INJECTION (LUMBAR BLOCK) BILATERAL L4/5 (Bilateral)    Final Diagnosis: DDD (degenerative disc disease), lumbar [M51.36]    Disposition: Home or self care    Patient Instructions:   Current Discharge Medication List      CONTINUE these medications which have NOT CHANGED    Details   celecoxib (CELEBREX) 100 MG capsule Take 100 mg by mouth once daily.      cephALEXin (KEFLEX) 500 MG capsule Take 1 capsule (500 mg total) by mouth every 8 (eight) hours.  Qty: 30 capsule, Refills: 0      furosemide (LASIX) 40 MG tablet One p.o. q.a.m. p.r.n. swelling wants edema has decreased can decrease to q.o.d. or Q3 days prn swelling  Qty: 30 tablet, Refills: 5      garlic 1 mg Cap Take 1 tablet by mouth once daily.       HYDROcodone-acetaminophen (NORCO) 5-325 mg per tablet Take 1 tablet by mouth every 8 (eight) hours as needed for Pain.  Qty: 15 tablet, Refills: 0    Comments: Quantity prescribed more than 7 day supply? No  Associated Diagnoses: Abdominal pain; Flank pain      metoprolol succinate (TOPROL-XL) 25 MG 24 hr tablet Take 25 mg by mouth once daily. Pt. Takes 25 mg in morning and 12.5 in the evening.      mupirocin (BACTROBAN) 2 % ointment Apply topically 3 (three) times daily.  Qty: 22 g, Refills: 1      omega 3-dha-epa-fish oil 1,200 (144-216) mg Cap Take by mouth.      potassium chloride (MICRO-K) 10 MEQ CpSR One p.o. q.d. with Lasix only  Qty: 30 capsule, Refills: 5      PRADAXA 150 mg Cap Take 150 mg by mouth 2 (two) times daily.                  Discharge Diagnosis: DDD (degenerative disc disease), lumbar [M51.36]  Condition on Discharge: Stable with no complications to procedure   Diet on Discharge: Same as before.  Activity: as per instruction sheet.  Discharge to: Home with a responsible  adult.  Follow up: 2-4 weeks    Please call my office or pager at [phone] if experienced any weakness or loss of sensation, fever > 101.5, pain uncontrolled with oral medications, persistent nausea/vomiting/or diarrhea, redness or drainage from the incisions, or any other worrisome concerns. If physician on call was not reached or could not communicate with our office for any reason please go to the nearest emergency department

## 2019-11-11 NOTE — OP NOTE
"Patient Name: Shon Asencio  MRN: [id number]    INFORMED CONSENT: The procedure, risks, benefits and options were discussed with patient. There are no contraindications to the procedure. The patient expressed understanding and agreed to proceed. The personnel performing the procedure was discussed. I verify that I personally obtained Shon's consent prior to the start of the procedure and the signed consent can be found on the patient's chart.    Procedure Date: 11/11/2019    Anesthesia: Topical    Pre Procedure diagnosis:   OSTEOARTHRITIS OF THE SPINE M47.9      2. Lumbar spondylosis      Post-Procedure diagnosis: SAME       Sedation: Yes - Fentanyl 50 mcg and Midazolam 2 mg    PROCEDURE: BILATERAL L4/5 FACET JOINT INJECTION      DESCRIPTION OF PROCEDURE:The patient was brought to the procedure room.  After performing time out. IV access was obtained prior to the procedure. The patient was positioned prone on the fluoroscopy table. Continuous hemodynamic monitoring was initiated including blood pressure, EKG, and pulse oximetry. The area of the lumbar spine was prepped with chlorhexidine and draped into a sterile field.Fluoroscopy was used to identify the location of  BILATERAL L4/5   joints respectivly. Skin anesthesia was achieved using 3 cc of Lidocaine 1% over the injection sites. A 25 gauge, 3 1/2" spinal needle was slowly inserted at each joint using APand oblique fluoroscopic imaging. Negative aspiration for blood or CSF was confirmed. 0.5 cc of Omnipaque  dye was inserted to confirm placement A combination of 2 ml bupivacaine 0.25% and 10 mg decadron was injected at all joints. The needles were removed and bleeding was nil. A sterile dressing was applied. No specimens collected. Shon was taken back to the recovery room for further observation.     Blood Loss: Nill  Specimen: None    Trevin Root MD  "

## 2019-11-11 NOTE — DISCHARGE INSTRUCTIONS

## 2019-11-11 NOTE — OR NURSING
Pt heart rate increased to 165 per monitor, EKG done heart rate max 120 , Afib noted in pt with known Afib. Reported to DR Root, pt asymptomatic. Dr root spoke to pt and advised he see his PCP or cardiologist today.

## 2019-12-09 ENCOUNTER — TELEPHONE (OUTPATIENT)
Dept: PRIMARY CARE CLINIC | Facility: CLINIC | Age: 69
End: 2019-12-09

## 2019-12-09 NOTE — TELEPHONE ENCOUNTER
Patient called Dr Bradford cell phone and states that he was having memory loss and missing doses of medications Dr Bradford asked patient to scheduled alon thing tomorrow and come fasting scheduled patient an appointment

## 2019-12-10 ENCOUNTER — OFFICE VISIT (OUTPATIENT)
Dept: PRIMARY CARE CLINIC | Facility: CLINIC | Age: 69
End: 2019-12-10
Payer: MEDICARE

## 2019-12-10 ENCOUNTER — CLINICAL SUPPORT (OUTPATIENT)
Dept: PRIMARY CARE CLINIC | Facility: CLINIC | Age: 69
End: 2019-12-10
Payer: MEDICARE

## 2019-12-10 VITALS
SYSTOLIC BLOOD PRESSURE: 114 MMHG | HEART RATE: 90 BPM | RESPIRATION RATE: 18 BRPM | WEIGHT: 315 LBS | BODY MASS INDEX: 39.17 KG/M2 | HEIGHT: 75 IN | OXYGEN SATURATION: 96 % | DIASTOLIC BLOOD PRESSURE: 62 MMHG

## 2019-12-10 DIAGNOSIS — Z86.79 HISTORY OF ATRIAL FIBRILLATION: ICD-10-CM

## 2019-12-10 DIAGNOSIS — I48.0 PAROXYSMAL ATRIAL FIBRILLATION: ICD-10-CM

## 2019-12-10 DIAGNOSIS — I10 HYPERTENSION, UNSPECIFIED TYPE: ICD-10-CM

## 2019-12-10 DIAGNOSIS — Z11.59 NEED FOR HEPATITIS C SCREENING TEST: ICD-10-CM

## 2019-12-10 DIAGNOSIS — R60.0 UNILATERAL EDEMA OF LOWER EXTREMITY: ICD-10-CM

## 2019-12-10 DIAGNOSIS — E78.5 HYPERLIPIDEMIA, UNSPECIFIED HYPERLIPIDEMIA TYPE: ICD-10-CM

## 2019-12-10 DIAGNOSIS — E66.9 OBESITY, UNSPECIFIED CLASSIFICATION, UNSPECIFIED OBESITY TYPE, UNSPECIFIED WHETHER SERIOUS COMORBIDITY PRESENT: ICD-10-CM

## 2019-12-10 DIAGNOSIS — R41.3 MEMORY LOSS: ICD-10-CM

## 2019-12-10 DIAGNOSIS — G47.33 OSA (OBSTRUCTIVE SLEEP APNEA): ICD-10-CM

## 2019-12-10 DIAGNOSIS — R09.89 OTHER SPECIFIED SYMPTOMS AND SIGNS INVOLVING THE CIRCULATORY AND RESPIRATORY SYSTEMS: ICD-10-CM

## 2019-12-10 DIAGNOSIS — I10 HYPERTENSION, UNSPECIFIED TYPE: Primary | ICD-10-CM

## 2019-12-10 LAB
ALBUMIN SERPL BCP-MCNC: 4 G/DL (ref 3.5–5.2)
ALP SERPL-CCNC: 51 U/L (ref 38–126)
ALT SERPL W/O P-5'-P-CCNC: 18 U/L (ref 17–63)
ANION GAP SERPL CALC-SCNC: 9 MMOL/L (ref 8–16)
AST SERPL-CCNC: 19 U/L (ref 15–41)
BASOPHILS # BLD AUTO: 0.1 K/UL (ref 0–0.2)
BASOPHILS NFR BLD: 1 % (ref 0–1.9)
BILIRUB SERPL-MCNC: 1 MG/DL (ref 0.3–1.2)
BILIRUB SERPL-MCNC: NORMAL MG/DL
BLOOD URINE, POC: NORMAL
BUN SERPL-MCNC: 14 MG/DL (ref 8–23)
CALCIUM SERPL-MCNC: 8.9 MG/DL (ref 8.6–10)
CHLORIDE SERPL-SCNC: 108 MMOL/L (ref 101–111)
CHOLEST SERPL-MCNC: 161 MG/DL (ref 80–200)
CHOLEST/HDLC SERPL: 4.6 {RATIO} (ref 2–5)
CO2 SERPL-SCNC: 23 MMOL/L (ref 23–29)
COLOR, POC UA: YELLOW
CREAT SERPL-MCNC: 0.9 MG/DL (ref 0.5–1.4)
DIFFERENTIAL METHOD: ABNORMAL
EOSINOPHIL # BLD AUTO: 0.3 K/UL (ref 0–0.5)
EOSINOPHIL NFR BLD: 3.4 % (ref 0–8)
ERYTHROCYTE [DISTWIDTH] IN BLOOD BY AUTOMATED COUNT: 14.6 % (ref 11.5–14.5)
EST. GFR  (AFRICAN AMERICAN): >60 ML/MIN/1.73 M^2
EST. GFR  (NON AFRICAN AMERICAN): >60 ML/MIN/1.73 M^2
GLUCOSE SERPL-MCNC: 112 MG/DL (ref 74–118)
GLUCOSE UR QL STRIP: NORMAL
HCT VFR BLD AUTO: 48.7 % (ref 40–54)
HDLC SERPL-MCNC: 35 MG/DL (ref 40–75)
HDLC SERPL: 21.7 % (ref 20–50)
HGB BLD-MCNC: 16.3 G/DL (ref 14–18)
KETONES UR QL STRIP: NORMAL
LDLC SERPL CALC-MCNC: 102 MG/DL
LEUKOCYTE ESTERASE URINE, POC: NORMAL
LYMPHOCYTES # BLD AUTO: 1.8 K/UL (ref 1–4.8)
LYMPHOCYTES NFR BLD: 23.3 % (ref 18–48)
MCH RBC QN AUTO: 30.6 PG (ref 27–31)
MCHC RBC AUTO-ENTMCNC: 33.5 G/DL (ref 32–36)
MCV RBC AUTO: 92 FL (ref 82–98)
MONOCYTES # BLD AUTO: 0.5 K/UL (ref 0.3–1)
MONOCYTES NFR BLD: 6.8 % (ref 4–15)
NEUTROPHILS # BLD AUTO: 5 K/UL (ref 1.8–7.7)
NEUTROPHILS NFR BLD: 65.5 % (ref 38–73)
NITRITE, POC UA: NORMAL
NONHDLC SERPL-MCNC: 126 MG/DL
PH, POC UA: 5
PLATELET # BLD AUTO: 218 K/UL (ref 150–350)
PMV BLD AUTO: 9.4 FL (ref 9.2–12.9)
POTASSIUM SERPL-SCNC: 4.1 MMOL/L (ref 3.5–5.1)
PROT SERPL-MCNC: 7.4 G/DL (ref 6–8.4)
PROTEIN, POC: NORMAL
RBC # BLD AUTO: 5.32 M/UL (ref 4.6–6.2)
SODIUM SERPL-SCNC: 140 MMOL/L (ref 136–145)
SPECIFIC GRAVITY, POC UA: 1.01
T4 FREE SERPL-MCNC: 1.02 NG/DL (ref 0.61–1.12)
TRIGL SERPL-MCNC: 119 MG/DL (ref 30–150)
TSH SERPL DL<=0.005 MIU/L-ACNC: 1.88 UIU/ML (ref 0.45–5.33)
UROBILINOGEN, POC UA: NORMAL
WBC # BLD AUTO: 7.7 K/UL (ref 3.9–12.7)

## 2019-12-10 PROCEDURE — 80053 COMPREHEN METABOLIC PANEL: CPT

## 2019-12-10 PROCEDURE — 84443 ASSAY THYROID STIM HORMONE: CPT

## 2019-12-10 PROCEDURE — 1159F PR MEDICATION LIST DOCUMENTED IN MEDICAL RECORD: ICD-10-PCS | Mod: ,,, | Performed by: FAMILY MEDICINE

## 2019-12-10 PROCEDURE — 99213 PR OFFICE/OUTPT VISIT, EST, LEVL III, 20-29 MIN: ICD-10-PCS | Mod: S$PBB,,, | Performed by: FAMILY MEDICINE

## 2019-12-10 PROCEDURE — 99214 OFFICE O/P EST MOD 30 MIN: CPT | Mod: PBBFAC,PN | Performed by: FAMILY MEDICINE

## 2019-12-10 PROCEDURE — 85025 COMPLETE CBC W/AUTO DIFF WBC: CPT

## 2019-12-10 PROCEDURE — 93005 ELECTROCARDIOGRAM TRACING: CPT | Mod: PBBFAC,PN | Performed by: INTERNAL MEDICINE

## 2019-12-10 PROCEDURE — 81002 URINALYSIS NONAUTO W/O SCOPE: CPT | Mod: PBBFAC,PN | Performed by: FAMILY MEDICINE

## 2019-12-10 PROCEDURE — 99213 OFFICE O/P EST LOW 20 MIN: CPT | Mod: S$PBB,,, | Performed by: FAMILY MEDICINE

## 2019-12-10 PROCEDURE — 99999 PR PBB SHADOW E&M-EST. PATIENT-LVL IV: ICD-10-PCS | Mod: PBBFAC,,, | Performed by: FAMILY MEDICINE

## 2019-12-10 PROCEDURE — 80061 LIPID PANEL: CPT

## 2019-12-10 PROCEDURE — 1126F AMNT PAIN NOTED NONE PRSNT: CPT | Mod: ,,, | Performed by: FAMILY MEDICINE

## 2019-12-10 PROCEDURE — 1126F PR PAIN SEVERITY QUANTIFIED, NO PAIN PRESENT: ICD-10-PCS | Mod: ,,, | Performed by: FAMILY MEDICINE

## 2019-12-10 PROCEDURE — 36415 COLL VENOUS BLD VENIPUNCTURE: CPT | Mod: PBBFAC,PN

## 2019-12-10 PROCEDURE — 86803 HEPATITIS C AB TEST: CPT

## 2019-12-10 PROCEDURE — 99999 PR PBB SHADOW E&M-EST. PATIENT-LVL IV: CPT | Mod: PBBFAC,,, | Performed by: FAMILY MEDICINE

## 2019-12-10 PROCEDURE — 84439 ASSAY OF FREE THYROXINE: CPT

## 2019-12-10 PROCEDURE — 1159F MED LIST DOCD IN RCRD: CPT | Mod: ,,, | Performed by: FAMILY MEDICINE

## 2019-12-10 RX ORDER — DABIGATRAN ETEXILATE MESYLATE 150 MG/1
150 CAPSULE ORAL 2 TIMES DAILY
Qty: 180 CAPSULE | Refills: 1 | Status: SHIPPED | OUTPATIENT
Start: 2019-12-10 | End: 2022-04-19 | Stop reason: SDUPTHER

## 2019-12-10 RX ORDER — CELECOXIB 100 MG/1
100 CAPSULE ORAL DAILY
Qty: 90 CAPSULE | Refills: 1 | Status: SHIPPED | OUTPATIENT
Start: 2019-12-10 | End: 2020-06-22 | Stop reason: SDUPTHER

## 2019-12-10 RX ORDER — METOPROLOL SUCCINATE 25 MG/1
25 TABLET, EXTENDED RELEASE ORAL DAILY
Qty: 135 TABLET | Refills: 1 | Status: SHIPPED | OUTPATIENT
Start: 2019-12-10 | End: 2022-04-19 | Stop reason: SDUPTHER

## 2019-12-10 NOTE — PROGRESS NOTES
Subjective:       Patient ID: Shon Asencio is a 69 y.o. male.    Chief Complaint: Neurologic Problem    HPI: 70 yo WM --yesterday patient was driving from [place] to [place]--after patient arrived at his house--wife was leaving--patient got out of the vehicle--asked wife where she was going.  Told patient she was going [employer]--she asked him where he was going--he said he did not.  Patient states he was supposed to meet his son in law at [employer] [address] but completely forgot was suppose swap vehicles.       No recent trauma--no recent illness.  No headache dizziness passing out seizures.       Was on vacation for the past 2 weeks--medication he has been taking routinely states was stopped.     ROS:   Skin: no psoriasis, eczema, skin cancer  HEENT: No headache, ocular pain, blurred vision, diplopia, epistaxis, hoarseness change in voice, thyroid trouble  Lung: No pneumonia, asthma, Tb, wheezing, SOB, smoking quarter pack per day + BURT on CPAP   Heart: No chest pain, ankle edema, palpitations, MI, barby murmur,+ hypertension, +hyperlipidemia +atrial fib-was cardioverted years ago 2001 but recurred immediately   Abdomen: No nausea, vomiting, diarrhea, constipation, ulcers, hepatitis, gallbladder disease, melena, hematochezia, hematemesis  : no UTI, renal disease, stones prostate  MS: no fractures, +O/A knees elbows fingers but does well with Celebrex,no  lupus, rheumatoid, gout .  Now patient has sciaticaNeuro: No dizziness, LOC, seizures    No diabetes, no anemia, no anxiety, no depression   , 5 children 13 grandson Retired lives with wife     Objective:   Physical Exam:  General: Well nourished, well developed, no acute distress + obese   Skin:  No lesions  HEENT: Eyes PERRLA, EOM intact, nose patent, throat non-erythematous ears TMs clear  NECK: Supple, no bruits, No JVD, no nodes  Lungs: Clear, no rales, rhonchi, wheezing distant sounds  Heart: Regular rate and rhythm, no murmurs,  gallops, or rubs  Abdomen: flat, bowel sounds positive, no tenderness, or organomegaly  MS: Range of motion and muscle strength intact  Neuro: Alert, CN intact, oriented X 3  Extremities: No cyanosis, clubbing, see skin above fluid appear     Assessment:       1. Hypertension, unspecified type    2. Memory loss    3. Hyperlipidemia, unspecified hyperlipidemia type    4. Obesity, unspecified classification, unspecified obesity type, unspecified whether serious comorbidity present    5. BURT (obstructive sleep apnea)    6. Unilateral edema of lower extremity    7. History of atrial fibrillation    8. Other specified symptoms and signs involving the circulatory and respiratory systems     9. Paroxysmal atrial fibrillation     10. Need for hepatitis C screening test        Plan:       Hypertension, unspecified type  -     CBC auto differential; Future; Expected date: 12/10/2019  -     Comprehensive metabolic panel; Future; Expected date: 12/10/2019  -     EKG 12-lead; Future  -     Fecal Immunochemical Test (iFOBT); Future; Expected date: 12/10/2019  -     Lipid panel; Future; Expected date: 12/10/2019  -     X-Ray Chest PA And Lateral; Future; Expected date: 12/10/2019  -     POCT urine dipstick without microscope  -     T4, free; Future; Expected date: 12/10/2019  -     TSH; Future; Expected date: 12/10/2019  -     US Carotid Bilateral; Future; Expected date: 12/10/2019  -     Holter monitor - 24 hour; Future  -     Echo Color Flow Doppler? Yes; Future  -     MRI Brain W WO Contrast; Future; Expected date: 12/10/2019    Memory loss  -     US Carotid Bilateral; Future; Expected date: 12/10/2019  -     Holter monitor - 24 hour; Future  -     Echo Color Flow Doppler? Yes; Future  -     MRI Brain W WO Contrast; Future; Expected date: 12/10/2019    Hyperlipidemia, unspecified hyperlipidemia type  -     US Carotid Bilateral; Future; Expected date: 12/10/2019  -     Holter monitor - 24 hour; Future  -     Echo Color Flow  Doppler? Yes; Future  -     MRI Brain W WO Contrast; Future; Expected date: 12/10/2019    Obesity, unspecified classification, unspecified obesity type, unspecified whether serious comorbidity present  -     Holter monitor - 24 hour; Future  -     Echo Color Flow Doppler? Yes; Future    BURT (obstructive sleep apnea)  -     Holter monitor - 24 hour; Future  -     Echo Color Flow Doppler? Yes; Future    Unilateral edema of lower extremity  -     Holter monitor - 24 hour; Future  -     Echo Color Flow Doppler? Yes; Future    History of atrial fibrillation  -     US Carotid Bilateral; Future; Expected date: 12/10/2019  -     Holter monitor - 24 hour; Future  -     Echo Color Flow Doppler? Yes; Future  -     MRI Brain W WO Contrast; Future; Expected date: 12/10/2019    Other specified symptoms and signs involving the circulatory and respiratory systems   -     US Carotid Bilateral; Future; Expected date: 12/10/2019    Paroxysmal atrial fibrillation   -     Echo Color Flow Doppler? Yes; Future    Need for hepatitis C screening test  -     Hepatitis C antibody; Future; Expected date: 12/10/2019    Other orders  -     celecoxib (CELEBREX) 100 MG capsule; Take 1 capsule (100 mg total) by mouth once daily.  Dispense: 90 capsule; Refill: 1  -     metoprolol succinate (TOPROL-XL) 25 MG 24 hr tablet; Take 1 tablet (25 mg total) by mouth once daily. Pt. Takes 25 mg in morning and 12.5 in the evening.  Dispense: 135 tablet; Refill: 1  -     PRADAXA 150 mg Cap; Take 1 capsule (150 mg total) by mouth 2 (two) times daily.  Dispense: 180 capsule; Refill: 1        Dementia workup--oriented place and time--Judgement --Good, Subtraction fair ---Proverbs good abstract thinking. 4 things --very good--clock drawing very good --no evidence of dementia  Memory loss--see history of present illness--Needs physical CBCs CMP lipids T4 TSH stool guaiac UA chest x-ray EKG is physical  Needs evaluation for possible mini-stroke echocardiogram/24 hr  Holter/carotid ultrasound/MRI of the brain--patient was office medication for about 2 weeks per Pradaxa metoprolol Celebrex Lasix  Low-sodium low-fat diet decrease weight     Health maintenance hepatitis C tetanus pneumococcal vaccine flu vaccine screening for abdominal aortic aneurysm  If workup negative in symptoms persist will consider neurology consult with Dr. Mercado

## 2019-12-11 LAB — HCV AB SERPL QL IA: NEGATIVE

## 2020-01-15 ENCOUNTER — PATIENT OUTREACH (OUTPATIENT)
Dept: ADMINISTRATIVE | Facility: OTHER | Age: 70
End: 2020-01-15

## 2020-01-15 ENCOUNTER — OFFICE VISIT (OUTPATIENT)
Dept: ORTHOPEDICS | Facility: CLINIC | Age: 70
End: 2020-01-15
Payer: MEDICARE

## 2020-01-15 VITALS — WEIGHT: 315 LBS | HEIGHT: 75 IN | BODY MASS INDEX: 39.17 KG/M2

## 2020-01-15 DIAGNOSIS — S83.421A SPRAIN OF LATERAL COLLATERAL LIGAMENT OF RIGHT KNEE, INITIAL ENCOUNTER: ICD-10-CM

## 2020-01-15 DIAGNOSIS — M54.16 LUMBAR RADICULOPATHY: Primary | ICD-10-CM

## 2020-01-15 DIAGNOSIS — M51.36 DDD (DEGENERATIVE DISC DISEASE), LUMBAR: ICD-10-CM

## 2020-01-15 DIAGNOSIS — S76.311A STRAIN OF RIGHT HAMSTRING, INITIAL ENCOUNTER: ICD-10-CM

## 2020-01-15 PROCEDURE — 99999 PR PBB SHADOW E&M-EST. PATIENT-LVL III: CPT | Mod: PBBFAC,,, | Performed by: PHYSICIAN ASSISTANT

## 2020-01-15 PROCEDURE — 1159F MED LIST DOCD IN RCRD: CPT | Mod: ,,, | Performed by: PHYSICIAN ASSISTANT

## 2020-01-15 PROCEDURE — 99213 PR OFFICE/OUTPT VISIT, EST, LEVL III, 20-29 MIN: ICD-10-PCS | Mod: S$PBB,,, | Performed by: PHYSICIAN ASSISTANT

## 2020-01-15 PROCEDURE — 99213 OFFICE O/P EST LOW 20 MIN: CPT | Mod: PBBFAC | Performed by: PHYSICIAN ASSISTANT

## 2020-01-15 PROCEDURE — 1125F AMNT PAIN NOTED PAIN PRSNT: CPT | Mod: ,,, | Performed by: PHYSICIAN ASSISTANT

## 2020-01-15 PROCEDURE — 99999 PR PBB SHADOW E&M-EST. PATIENT-LVL III: ICD-10-PCS | Mod: PBBFAC,,, | Performed by: PHYSICIAN ASSISTANT

## 2020-01-15 PROCEDURE — 99213 OFFICE O/P EST LOW 20 MIN: CPT | Mod: S$PBB,,, | Performed by: PHYSICIAN ASSISTANT

## 2020-01-15 PROCEDURE — 1125F PR PAIN SEVERITY QUANTIFIED, PAIN PRESENT: ICD-10-PCS | Mod: ,,, | Performed by: PHYSICIAN ASSISTANT

## 2020-01-15 PROCEDURE — 1159F PR MEDICATION LIST DOCUMENTED IN MEDICAL RECORD: ICD-10-PCS | Mod: ,,, | Performed by: PHYSICIAN ASSISTANT

## 2020-01-15 RX ORDER — TRAMADOL HYDROCHLORIDE 50 MG/1
50 TABLET ORAL EVERY 4 HOURS PRN
Qty: 42 TABLET | Refills: 0 | Status: SHIPPED | OUTPATIENT
Start: 2020-01-15 | End: 2020-01-25

## 2020-01-15 NOTE — PROGRESS NOTES
"DATE: 1/15/2020  PATIENT: Shon Asencio    Attending Physician: Jonathan Patterson M.D.    HISTORY:  Shon Asencio is a 69 y.o. male who returns to me today for evaluation of right leg pain.  He was last seen by me 10/22/2019.  Today he is doing well but notes his left leg pain improved after the injection 11/11/2019.  About 3 weeks ago he was getting out of his truck and twisted his knee.  He has had hamstring and calf pain since then.  It is intermittent.     The Patient denies myelopathic symptoms such as handwriting changes or difficulty with buttons/coins/keys. Denies perineal paresthesias, bowel/bladder dysfunction.    PMH/PSH/FamHx/SocHx:  Unchanged from prior visit    ROS:  REVIEW OF SYSTEMS:  Constitution: Negative. Negative for chills, fever and night sweats.   HENT: Negative for congestion and headaches.    Eyes: Negative for blurred vision, left vision loss and right vision loss.   Cardiovascular: Negative for chest pain and syncope.   Respiratory: Negative for cough and shortness of breath.    Endocrine: Negative for polydipsia, polyphagia and polyuria.   Hematologic/Lymphatic: Negative for bleeding problem. Does not bruise/bleed easily.   Skin: Negative for dry skin, itching and rash.   Musculoskeletal: Negative for falls and muscle weakness.   Gastrointestinal: Negative for abdominal pain and bowel incontinence.   Allergic/Immunologic: Negative for hives and persistent infections.  Genitourinary: Negative for urinary retention/incontinence and nocturia.   Neurological: Negative for disturbances in coordination, no myelopathic symptoms such as handwriting changes or difficulty with buttons, coins, keys or small objects. No loss of balance and seizures.   Psychiatric/Behavioral: Negative for depression. The patient does not have insomnia.   Denies perineal paresthesias, bowel or bladder incontinence    EXAM:  Ht 6' 3" (1.905 m)   Wt (!) 183.4 kg (404 lb 6.9 oz)   BMI 50.55 kg/m² "     Physical exam stable. Neuro exam stable. There is mild hamstring and calf tenderness.       IMAGING:  No new imaging today.    Today I personally re- reviewed AP, Lat and Flex/Ex  upright L-spine that demonstrate grade I anterolisthesis of L4/5 and mild to moderate degenerative changes throughout the lumbar spine.      MRI lumbar spine demonstrates moderate facet arthropathy at L4/5.     Body mass index is 50.55 kg/m².    No results found for: HGBA1C      ASSESSMENT/PLAN:    Shon was seen today for pain.    Diagnoses and all orders for this visit:    Lumbar radiculopathy    DDD (degenerative disc disease), lumbar    Sprain of lateral collateral ligament of right knee, initial encounter    Strain of right hamstring, initial encounter    Other orders  -     traMADol (ULTRAM) 50 mg tablet; Take 1 tablet (50 mg total) by mouth every 4 (four) hours as needed.      We will give it a couple more weeks with alternating heat and ice.  He will take tramadol as needed.  He will follow up with me in 3-4 weeks if symptoms persist.  We may consider an MRI at that time.     Follow up if symptoms worsen or fail to improve.

## 2020-01-28 ENCOUNTER — LAB VISIT (OUTPATIENT)
Dept: LAB | Facility: HOSPITAL | Age: 70
End: 2020-01-28
Attending: FAMILY MEDICINE
Payer: MEDICARE

## 2020-01-28 DIAGNOSIS — I10 HYPERTENSION, UNSPECIFIED TYPE: ICD-10-CM

## 2020-01-28 PROCEDURE — 82274 ASSAY TEST FOR BLOOD FECAL: CPT

## 2020-02-05 LAB — HEMOCCULT STL QL IA: POSITIVE

## 2020-03-13 PROBLEM — Z86.010 ENCOUNTER FOR COLONOSCOPY DUE TO HISTORY OF ADENOMATOUS COLONIC POLYPS: Status: ACTIVE | Noted: 2020-03-13

## 2020-03-13 PROBLEM — Z12.11 ENCOUNTER FOR COLONOSCOPY DUE TO HISTORY OF ADENOMATOUS COLONIC POLYPS: Status: ACTIVE | Noted: 2020-03-13

## 2020-03-13 PROBLEM — Z86.0101 ENCOUNTER FOR COLONOSCOPY DUE TO HISTORY OF ADENOMATOUS COLONIC POLYPS: Status: ACTIVE | Noted: 2020-03-13

## 2020-04-28 DIAGNOSIS — R42 VERTIGO: Primary | ICD-10-CM

## 2020-04-28 NOTE — TELEPHONE ENCOUNTER
----- Message from Kaylin Gonzalez sent at 4/28/2020  3:02 PM CDT -----  Contact: Pateint [phone]  Patient has been experiencing vertigo for two weeks and is requesting medication.    [facility] [address] [phone] (Phone) [phone] (Fax)

## 2020-04-29 RX ORDER — MECLIZINE HCL 12.5 MG 12.5 MG/1
12.5 TABLET ORAL 3 TIMES DAILY PRN
Qty: 20 TABLET | Refills: 0 | Status: SHIPPED | OUTPATIENT
Start: 2020-04-29 | End: 2021-06-09

## 2020-04-29 RX ORDER — MECLIZINE HCL 12.5 MG 12.5 MG/1
12.5 TABLET ORAL 3 TIMES DAILY PRN
Qty: 20 TABLET | Refills: 0 | Status: SHIPPED | OUTPATIENT
Start: 2020-04-29 | End: 2020-04-29 | Stop reason: SDUPTHER

## 2020-04-29 NOTE — TELEPHONE ENCOUNTER
----- Message from Lyla Singh sent at 4/29/2020  2:54 PM CDT -----  Contact: self/[phone]  Please sent the medication to [facility] - [address] [phone] (Phone) [phone] (Fax). As the patient request it. Please call when it has been sent . Thank you.

## 2020-06-05 RX ORDER — TRAMADOL HYDROCHLORIDE 50 MG/1
50 TABLET ORAL EVERY 6 HOURS PRN
OUTPATIENT
Start: 2020-06-05

## 2020-06-05 NOTE — TELEPHONE ENCOUNTER
----- Message from Primo Knowles sent at 6/5/2020  8:17 AM CDT -----  Contact: pt   Please call pt at [phone]    Refill request for traMADol (ULTRAM) 50 mg tablet    Use [facility] - [address] [phone] (Phone)  [phone] (Fax)    Patient would like to  his RX today (going out of town today)    Thank you

## 2020-06-22 RX ORDER — CELECOXIB 100 MG/1
100 CAPSULE ORAL DAILY
Qty: 90 CAPSULE | Refills: 1 | Status: SHIPPED | OUTPATIENT
Start: 2020-06-22 | End: 2020-06-23

## 2020-06-22 NOTE — TELEPHONE ENCOUNTER
----- Message from Ana Laura Ny sent at 6/22/2020 11:15 AM CDT -----  Contact: Patient  Type:  RX Refill Request    Who Called: Rip, patient  Refill or New Rx:  Refill  RX Name and Strength:  celecoxib (CELEBREX) 100 MG capsule  How is the patient currently taking it? (ex. 1XDay):  Take 1 capsule (100 mg total) by mouth once daily  Is this a 30 day or 90 day RX:  90  Preferred Pharmacy with phone number:    [facility] [place] [address] [address]  Phone: [phone] Fax: [phone]  Local or Mail Order:  Local  Ordering Provider:  Dr Bradford  Would the patient rather a call back or a response via [facility]?   Phone call  Best Call Back Number:  [phone]  Additional Information: Please advise. Thanks.

## 2020-09-29 ENCOUNTER — PATIENT MESSAGE (OUTPATIENT)
Dept: OTHER | Facility: OTHER | Age: 70
End: 2020-09-29

## 2020-11-13 ENCOUNTER — TELEPHONE (OUTPATIENT)
Dept: PRIMARY CARE CLINIC | Facility: CLINIC | Age: 70
End: 2020-11-13

## 2020-11-13 NOTE — TELEPHONE ENCOUNTER
----- Message from Kaylin Gonzalez sent at 11/13/2020  4:40 PM CST -----  Regarding: please call  Contact: patient [phone]  Please call [phone] - regarding a PEC

## 2020-11-13 NOTE — TELEPHONE ENCOUNTER
Spoke with patient, wanting to have his daughter JEFFREY'd. Verbalized that Dr. Bradford is no longer the  and he would have to contact Dr. Derrick Beckham. [facility] contact info given to patient. Patient verbalized understanding.

## 2020-11-20 ENCOUNTER — TELEPHONE (OUTPATIENT)
Dept: PRIMARY CARE CLINIC | Facility: CLINIC | Age: 70
End: 2020-11-20

## 2020-11-20 ENCOUNTER — CLINICAL SUPPORT (OUTPATIENT)
Dept: PRIMARY CARE CLINIC | Facility: CLINIC | Age: 70
End: 2020-11-20
Payer: MEDICARE

## 2020-11-20 DIAGNOSIS — Z23 NEED FOR PROPHYLACTIC VACCINATION AND INOCULATION AGAINST INFLUENZA: Primary | ICD-10-CM

## 2020-11-20 PROCEDURE — 90694 VACC AIIV4 NO PRSRV 0.5ML IM: CPT | Mod: PBBFAC,PN

## 2020-11-20 PROCEDURE — G0008 ADMIN INFLUENZA VIRUS VAC: HCPCS | Mod: PBBFAC,PN

## 2020-11-20 NOTE — PROGRESS NOTES
Verified pt ID using name and . NKDA. Administered Influenza High Dose vaccine in right deltoid per physician order using aseptic technique. Aspirated and no blood return noted. Pt tolerated well with no adverse reactions noted.

## 2020-11-20 NOTE — TELEPHONE ENCOUNTER
----- Message from Ana De León sent at 11/20/2020 12:33 PM CST -----  Please call patient he has a question

## 2020-12-11 ENCOUNTER — PATIENT MESSAGE (OUTPATIENT)
Dept: OTHER | Facility: OTHER | Age: 70
End: 2020-12-11

## 2020-12-18 ENCOUNTER — PES CALL (OUTPATIENT)
Dept: ADMINISTRATIVE | Facility: CLINIC | Age: 70
End: 2020-12-18

## 2021-01-04 ENCOUNTER — PATIENT MESSAGE (OUTPATIENT)
Dept: ADMINISTRATIVE | Facility: HOSPITAL | Age: 71
End: 2021-01-04

## 2021-01-06 ENCOUNTER — TELEPHONE (OUTPATIENT)
Dept: PRIMARY CARE CLINIC | Facility: CLINIC | Age: 71
End: 2021-01-06

## 2021-01-06 DIAGNOSIS — E78.5 HYPERLIPIDEMIA, UNSPECIFIED HYPERLIPIDEMIA TYPE: ICD-10-CM

## 2021-01-06 DIAGNOSIS — E66.9 OBESITY, UNSPECIFIED CLASSIFICATION, UNSPECIFIED OBESITY TYPE, UNSPECIFIED WHETHER SERIOUS COMORBIDITY PRESENT: ICD-10-CM

## 2021-01-06 DIAGNOSIS — I10 HYPERTENSION, UNSPECIFIED TYPE: Primary | ICD-10-CM

## 2021-01-06 DIAGNOSIS — I10 ESSENTIAL (PRIMARY) HYPERTENSION: ICD-10-CM

## 2021-01-07 RX ORDER — ATORVASTATIN CALCIUM 20 MG/1
20 TABLET, FILM COATED ORAL DAILY
Qty: 90 TABLET | Refills: 1 | Status: SHIPPED | OUTPATIENT
Start: 2021-01-07 | End: 2021-08-02 | Stop reason: SDUPTHER

## 2021-01-14 ENCOUNTER — PATIENT OUTREACH (OUTPATIENT)
Dept: ADMINISTRATIVE | Facility: HOSPITAL | Age: 71
End: 2021-01-14

## 2021-01-19 ENCOUNTER — OFFICE VISIT (OUTPATIENT)
Dept: PRIMARY CARE CLINIC | Facility: CLINIC | Age: 71
End: 2021-01-19
Payer: MEDICARE

## 2021-01-19 VITALS
TEMPERATURE: 98 F | SYSTOLIC BLOOD PRESSURE: 116 MMHG | HEART RATE: 88 BPM | WEIGHT: 315 LBS | HEIGHT: 75 IN | OXYGEN SATURATION: 99 % | RESPIRATION RATE: 18 BRPM | BODY MASS INDEX: 39.17 KG/M2 | DIASTOLIC BLOOD PRESSURE: 70 MMHG

## 2021-01-19 DIAGNOSIS — R73.03 BORDERLINE DIABETES: ICD-10-CM

## 2021-01-19 DIAGNOSIS — I10 HYPERTENSION, UNSPECIFIED TYPE: ICD-10-CM

## 2021-01-19 DIAGNOSIS — M54.16 LUMBAR RADICULOPATHY: ICD-10-CM

## 2021-01-19 DIAGNOSIS — Z23 NEED FOR PNEUMOCOCCAL VACCINE: Primary | ICD-10-CM

## 2021-01-19 DIAGNOSIS — G47.33 OSA (OBSTRUCTIVE SLEEP APNEA): ICD-10-CM

## 2021-01-19 DIAGNOSIS — E78.5 HYPERLIPIDEMIA, UNSPECIFIED HYPERLIPIDEMIA TYPE: ICD-10-CM

## 2021-01-19 DIAGNOSIS — E66.9 OBESITY, UNSPECIFIED CLASSIFICATION, UNSPECIFIED OBESITY TYPE, UNSPECIFIED WHETHER SERIOUS COMORBIDITY PRESENT: ICD-10-CM

## 2021-01-19 DIAGNOSIS — Z86.79 HISTORY OF ATRIAL FIBRILLATION: ICD-10-CM

## 2021-01-19 DIAGNOSIS — Z12.5 ENCOUNTER FOR SCREENING FOR MALIGNANT NEOPLASM OF PROSTATE: ICD-10-CM

## 2021-01-19 DIAGNOSIS — R41.3 MEMORY LOSS: ICD-10-CM

## 2021-01-19 PROCEDURE — 3074F PR MOST RECENT SYSTOLIC BLOOD PRESSURE < 130 MM HG: ICD-10-PCS | Mod: CPTII,S$GLB,, | Performed by: FAMILY MEDICINE

## 2021-01-19 PROCEDURE — 3008F PR BODY MASS INDEX (BMI) DOCUMENTED: ICD-10-PCS | Mod: CPTII,S$GLB,, | Performed by: FAMILY MEDICINE

## 2021-01-19 PROCEDURE — 90670 PNEUMOCOCCAL CONJUGATE VACCINE 13-VALENT LESS THAN 5YO & GREATER THAN: ICD-10-PCS | Mod: S$GLB,,, | Performed by: FAMILY MEDICINE

## 2021-01-19 PROCEDURE — 3078F DIAST BP <80 MM HG: CPT | Mod: CPTII,S$GLB,, | Performed by: FAMILY MEDICINE

## 2021-01-19 PROCEDURE — 3288F FALL RISK ASSESSMENT DOCD: CPT | Mod: CPTII,S$GLB,, | Performed by: FAMILY MEDICINE

## 2021-01-19 PROCEDURE — G0009 ADMIN PNEUMOCOCCAL VACCINE: HCPCS | Mod: S$GLB,,, | Performed by: FAMILY MEDICINE

## 2021-01-19 PROCEDURE — 99397 PR PREVENTIVE VISIT,EST,65 & OVER: ICD-10-PCS | Mod: 25,S$GLB,, | Performed by: FAMILY MEDICINE

## 2021-01-19 PROCEDURE — 3078F PR MOST RECENT DIASTOLIC BLOOD PRESSURE < 80 MM HG: ICD-10-PCS | Mod: CPTII,S$GLB,, | Performed by: FAMILY MEDICINE

## 2021-01-19 PROCEDURE — 1101F PT FALLS ASSESS-DOCD LE1/YR: CPT | Mod: CPTII,S$GLB,, | Performed by: FAMILY MEDICINE

## 2021-01-19 PROCEDURE — 1126F PR PAIN SEVERITY QUANTIFIED, NO PAIN PRESENT: ICD-10-PCS | Mod: S$GLB,,, | Performed by: FAMILY MEDICINE

## 2021-01-19 PROCEDURE — 99999 PR PBB SHADOW E&M-EST. PATIENT-LVL IV: CPT | Mod: PBBFAC,,, | Performed by: FAMILY MEDICINE

## 2021-01-19 PROCEDURE — G0009 PNEUMOCOCCAL CONJUGATE VACCINE 13-VALENT LESS THAN 5YO & GREATER THAN: ICD-10-PCS | Mod: S$GLB,,, | Performed by: FAMILY MEDICINE

## 2021-01-19 PROCEDURE — 3008F BODY MASS INDEX DOCD: CPT | Mod: CPTII,S$GLB,, | Performed by: FAMILY MEDICINE

## 2021-01-19 PROCEDURE — 1126F AMNT PAIN NOTED NONE PRSNT: CPT | Mod: S$GLB,,, | Performed by: FAMILY MEDICINE

## 2021-01-19 PROCEDURE — 3074F SYST BP LT 130 MM HG: CPT | Mod: CPTII,S$GLB,, | Performed by: FAMILY MEDICINE

## 2021-01-19 PROCEDURE — 99999 PR PBB SHADOW E&M-EST. PATIENT-LVL IV: ICD-10-PCS | Mod: PBBFAC,,, | Performed by: FAMILY MEDICINE

## 2021-01-19 PROCEDURE — 3288F PR FALLS RISK ASSESSMENT DOCUMENTED: ICD-10-PCS | Mod: CPTII,S$GLB,, | Performed by: FAMILY MEDICINE

## 2021-01-19 PROCEDURE — 1101F PR PT FALLS ASSESS DOC 0-1 FALLS W/OUT INJ PAST YR: ICD-10-PCS | Mod: CPTII,S$GLB,, | Performed by: FAMILY MEDICINE

## 2021-01-19 PROCEDURE — 90670 PCV13 VACCINE IM: CPT | Mod: S$GLB,,, | Performed by: FAMILY MEDICINE

## 2021-01-19 PROCEDURE — 99397 PER PM REEVAL EST PAT 65+ YR: CPT | Mod: 25,S$GLB,, | Performed by: FAMILY MEDICINE

## 2021-01-19 RX ORDER — CELECOXIB 100 MG/1
100 CAPSULE ORAL DAILY
COMMUNITY
Start: 2020-10-19 | End: 2021-01-19 | Stop reason: SDUPTHER

## 2021-01-19 RX ORDER — CELECOXIB 100 MG/1
100 CAPSULE ORAL DAILY
Qty: 90 CAPSULE | Refills: 3 | Status: SHIPPED | OUTPATIENT
Start: 2021-01-19 | End: 2021-01-19 | Stop reason: SDUPTHER

## 2021-01-19 RX ORDER — CELECOXIB 100 MG/1
100 CAPSULE ORAL DAILY
Qty: 90 CAPSULE | Refills: 3 | Status: SHIPPED | OUTPATIENT
Start: 2021-01-19 | End: 2022-02-10 | Stop reason: SDUPTHER

## 2021-01-19 RX ORDER — TRAMADOL HYDROCHLORIDE 50 MG/1
50 TABLET ORAL EVERY 4 HOURS PRN
Qty: 30 TABLET | Refills: 3 | Status: CANCELLED | OUTPATIENT
Start: 2021-01-19

## 2021-01-19 RX ORDER — TRAMADOL HYDROCHLORIDE 50 MG/1
50 TABLET ORAL EVERY 4 HOURS PRN
COMMUNITY
Start: 2020-11-12 | End: 2021-01-19 | Stop reason: SDUPTHER

## 2021-01-19 RX ORDER — TRAMADOL HYDROCHLORIDE 50 MG/1
TABLET ORAL
Qty: 30 TABLET | Refills: 0 | OUTPATIENT
Start: 2021-01-19 | End: 2021-05-30

## 2021-02-11 ENCOUNTER — PES CALL (OUTPATIENT)
Dept: ADMINISTRATIVE | Facility: CLINIC | Age: 71
End: 2021-02-11

## 2021-04-26 ENCOUNTER — PATIENT MESSAGE (OUTPATIENT)
Dept: RESEARCH | Facility: HOSPITAL | Age: 71
End: 2021-04-26

## 2021-04-30 ENCOUNTER — EXTERNAL CHRONIC CARE MANAGEMENT (OUTPATIENT)
Dept: PRIMARY CARE CLINIC | Facility: CLINIC | Age: 71
End: 2021-04-30
Payer: MEDICARE

## 2021-04-30 PROCEDURE — 99490 CHRNC CARE MGMT STAFF 1ST 20: CPT | Mod: S$GLB,,, | Performed by: FAMILY MEDICINE

## 2021-04-30 PROCEDURE — 99490 PR CHRONIC CARE MGMT, 1ST 20 MIN: ICD-10-PCS | Mod: S$GLB,,, | Performed by: FAMILY MEDICINE

## 2021-05-31 ENCOUNTER — EXTERNAL CHRONIC CARE MANAGEMENT (OUTPATIENT)
Dept: PRIMARY CARE CLINIC | Facility: CLINIC | Age: 71
End: 2021-05-31
Payer: MEDICARE

## 2021-05-31 PROCEDURE — 99490 CHRNC CARE MGMT STAFF 1ST 20: CPT | Mod: S$GLB,,, | Performed by: FAMILY MEDICINE

## 2021-05-31 PROCEDURE — 99490 PR CHRONIC CARE MGMT, 1ST 20 MIN: ICD-10-PCS | Mod: S$GLB,,, | Performed by: FAMILY MEDICINE

## 2021-06-08 ENCOUNTER — TELEPHONE (OUTPATIENT)
Dept: PRIMARY CARE CLINIC | Facility: CLINIC | Age: 71
End: 2021-06-08

## 2021-06-09 ENCOUNTER — OFFICE VISIT (OUTPATIENT)
Dept: PRIMARY CARE CLINIC | Facility: CLINIC | Age: 71
End: 2021-06-09
Payer: MEDICARE

## 2021-06-09 VITALS
HEIGHT: 75 IN | WEIGHT: 315 LBS | HEART RATE: 91 BPM | BODY MASS INDEX: 39.17 KG/M2 | DIASTOLIC BLOOD PRESSURE: 82 MMHG | SYSTOLIC BLOOD PRESSURE: 126 MMHG | RESPIRATION RATE: 18 BRPM | OXYGEN SATURATION: 95 % | TEMPERATURE: 98 F

## 2021-06-09 DIAGNOSIS — E66.9 OBESITY, UNSPECIFIED CLASSIFICATION, UNSPECIFIED OBESITY TYPE, UNSPECIFIED WHETHER SERIOUS COMORBIDITY PRESENT: ICD-10-CM

## 2021-06-09 DIAGNOSIS — E78.5 HYPERLIPIDEMIA, UNSPECIFIED HYPERLIPIDEMIA TYPE: ICD-10-CM

## 2021-06-09 DIAGNOSIS — I10 HYPERTENSION, UNSPECIFIED TYPE: ICD-10-CM

## 2021-06-09 DIAGNOSIS — Z86.79 HISTORY OF ATRIAL FIBRILLATION: ICD-10-CM

## 2021-06-09 DIAGNOSIS — R41.3 MEMORY LOSS: Primary | ICD-10-CM

## 2021-06-09 PROCEDURE — 99214 OFFICE O/P EST MOD 30 MIN: CPT | Mod: S$GLB,,, | Performed by: FAMILY MEDICINE

## 2021-06-09 PROCEDURE — 1125F PR PAIN SEVERITY QUANTIFIED, PAIN PRESENT: ICD-10-PCS | Mod: S$GLB,,, | Performed by: FAMILY MEDICINE

## 2021-06-09 PROCEDURE — 3008F BODY MASS INDEX DOCD: CPT | Mod: CPTII,S$GLB,, | Performed by: FAMILY MEDICINE

## 2021-06-09 PROCEDURE — 1101F PT FALLS ASSESS-DOCD LE1/YR: CPT | Mod: CPTII,S$GLB,, | Performed by: FAMILY MEDICINE

## 2021-06-09 PROCEDURE — 1101F PR PT FALLS ASSESS DOC 0-1 FALLS W/OUT INJ PAST YR: ICD-10-PCS | Mod: CPTII,S$GLB,, | Performed by: FAMILY MEDICINE

## 2021-06-09 PROCEDURE — 99214 PR OFFICE/OUTPT VISIT, EST, LEVL IV, 30-39 MIN: ICD-10-PCS | Mod: S$GLB,,, | Performed by: FAMILY MEDICINE

## 2021-06-09 PROCEDURE — 99999 PR PBB SHADOW E&M-EST. PATIENT-LVL IV: ICD-10-PCS | Mod: PBBFAC,,, | Performed by: FAMILY MEDICINE

## 2021-06-09 PROCEDURE — 3288F PR FALLS RISK ASSESSMENT DOCUMENTED: ICD-10-PCS | Mod: CPTII,S$GLB,, | Performed by: FAMILY MEDICINE

## 2021-06-09 PROCEDURE — 1125F AMNT PAIN NOTED PAIN PRSNT: CPT | Mod: S$GLB,,, | Performed by: FAMILY MEDICINE

## 2021-06-09 PROCEDURE — 1159F MED LIST DOCD IN RCRD: CPT | Mod: S$GLB,,, | Performed by: FAMILY MEDICINE

## 2021-06-09 PROCEDURE — 3288F FALL RISK ASSESSMENT DOCD: CPT | Mod: CPTII,S$GLB,, | Performed by: FAMILY MEDICINE

## 2021-06-09 PROCEDURE — 1159F PR MEDICATION LIST DOCUMENTED IN MEDICAL RECORD: ICD-10-PCS | Mod: S$GLB,,, | Performed by: FAMILY MEDICINE

## 2021-06-09 PROCEDURE — 3008F PR BODY MASS INDEX (BMI) DOCUMENTED: ICD-10-PCS | Mod: CPTII,S$GLB,, | Performed by: FAMILY MEDICINE

## 2021-06-09 PROCEDURE — 99999 PR PBB SHADOW E&M-EST. PATIENT-LVL IV: CPT | Mod: PBBFAC,,, | Performed by: FAMILY MEDICINE

## 2021-06-09 RX ORDER — FUROSEMIDE 40 MG/1
TABLET ORAL
Qty: 30 TABLET | Refills: 2 | Status: SHIPPED | OUTPATIENT
Start: 2021-06-09 | End: 2021-06-10 | Stop reason: SDUPTHER

## 2021-06-09 RX ORDER — CEPHALEXIN 250 MG/1
250 CAPSULE ORAL 3 TIMES DAILY
COMMUNITY
Start: 2021-05-13 | End: 2021-06-09

## 2021-06-09 RX ORDER — MUPIROCIN 20 MG/G
OINTMENT TOPICAL
COMMUNITY
Start: 2021-05-24 | End: 2021-07-28 | Stop reason: SDUPTHER

## 2021-06-09 RX ORDER — FUROSEMIDE 40 MG/1
40 TABLET ORAL
COMMUNITY
Start: 2021-05-24 | End: 2021-06-09 | Stop reason: SDUPTHER

## 2021-06-09 RX ORDER — POTASSIUM CHLORIDE 750 MG/1
TABLET, EXTENDED RELEASE ORAL
Qty: 30 TABLET | Refills: 2 | Status: SHIPPED | OUTPATIENT
Start: 2021-06-09 | End: 2021-06-10 | Stop reason: SDUPTHER

## 2021-06-09 RX ORDER — CLINDAMYCIN HYDROCHLORIDE 300 MG/1
300 CAPSULE ORAL 3 TIMES DAILY
Qty: 21 CAPSULE | Refills: 0 | Status: SHIPPED | OUTPATIENT
Start: 2021-06-09 | End: 2021-06-10 | Stop reason: SDUPTHER

## 2021-06-12 RX ORDER — FUROSEMIDE 40 MG/1
TABLET ORAL
Qty: 30 TABLET | Refills: 2 | Status: SHIPPED | OUTPATIENT
Start: 2021-06-12 | End: 2022-03-16 | Stop reason: ALTCHOICE

## 2021-06-12 RX ORDER — POTASSIUM CHLORIDE 750 MG/1
TABLET, EXTENDED RELEASE ORAL
Qty: 30 TABLET | Refills: 2 | Status: SHIPPED | OUTPATIENT
Start: 2021-06-12 | End: 2022-03-16 | Stop reason: ALTCHOICE

## 2021-06-12 RX ORDER — CLINDAMYCIN HYDROCHLORIDE 300 MG/1
300 CAPSULE ORAL 3 TIMES DAILY
Qty: 21 CAPSULE | Refills: 0 | Status: SHIPPED | OUTPATIENT
Start: 2021-06-12 | End: 2021-07-28

## 2021-06-14 ENCOUNTER — TELEPHONE (OUTPATIENT)
Dept: PRIMARY CARE CLINIC | Facility: CLINIC | Age: 71
End: 2021-06-14

## 2021-07-28 ENCOUNTER — OFFICE VISIT (OUTPATIENT)
Dept: PRIMARY CARE CLINIC | Facility: CLINIC | Age: 71
End: 2021-07-28
Payer: MEDICARE

## 2021-07-28 VITALS
WEIGHT: 315 LBS | DIASTOLIC BLOOD PRESSURE: 70 MMHG | SYSTOLIC BLOOD PRESSURE: 124 MMHG | BODY MASS INDEX: 39.17 KG/M2 | RESPIRATION RATE: 18 BRPM | HEIGHT: 75 IN | HEART RATE: 80 BPM | OXYGEN SATURATION: 97 % | TEMPERATURE: 98 F

## 2021-07-28 DIAGNOSIS — Z86.79 HISTORY OF ATRIAL FIBRILLATION: ICD-10-CM

## 2021-07-28 DIAGNOSIS — L98.9 SKIN LESION OF RIGHT LEG: ICD-10-CM

## 2021-07-28 DIAGNOSIS — M54.16 LUMBAR RADICULOPATHY: Primary | ICD-10-CM

## 2021-07-28 DIAGNOSIS — G47.33 OSA (OBSTRUCTIVE SLEEP APNEA): ICD-10-CM

## 2021-07-28 DIAGNOSIS — E66.9 OBESITY, UNSPECIFIED CLASSIFICATION, UNSPECIFIED OBESITY TYPE, UNSPECIFIED WHETHER SERIOUS COMORBIDITY PRESENT: ICD-10-CM

## 2021-07-28 DIAGNOSIS — I10 HYPERTENSION, UNSPECIFIED TYPE: ICD-10-CM

## 2021-07-28 DIAGNOSIS — E78.5 HYPERLIPIDEMIA, UNSPECIFIED HYPERLIPIDEMIA TYPE: ICD-10-CM

## 2021-07-28 PROCEDURE — 99999 PR PBB SHADOW E&M-EST. PATIENT-LVL V: ICD-10-PCS | Mod: PBBFAC,,, | Performed by: FAMILY MEDICINE

## 2021-07-28 PROCEDURE — 1159F MED LIST DOCD IN RCRD: CPT | Mod: CPTII,S$GLB,, | Performed by: FAMILY MEDICINE

## 2021-07-28 PROCEDURE — 1126F PR PAIN SEVERITY QUANTIFIED, NO PAIN PRESENT: ICD-10-PCS | Mod: CPTII,S$GLB,, | Performed by: FAMILY MEDICINE

## 2021-07-28 PROCEDURE — 3008F BODY MASS INDEX DOCD: CPT | Mod: CPTII,S$GLB,, | Performed by: FAMILY MEDICINE

## 2021-07-28 PROCEDURE — 3288F PR FALLS RISK ASSESSMENT DOCUMENTED: ICD-10-PCS | Mod: CPTII,S$GLB,, | Performed by: FAMILY MEDICINE

## 2021-07-28 PROCEDURE — 3008F PR BODY MASS INDEX (BMI) DOCUMENTED: ICD-10-PCS | Mod: CPTII,S$GLB,, | Performed by: FAMILY MEDICINE

## 2021-07-28 PROCEDURE — 99999 PR PBB SHADOW E&M-EST. PATIENT-LVL V: CPT | Mod: PBBFAC,,, | Performed by: FAMILY MEDICINE

## 2021-07-28 PROCEDURE — 1101F PT FALLS ASSESS-DOCD LE1/YR: CPT | Mod: CPTII,S$GLB,, | Performed by: FAMILY MEDICINE

## 2021-07-28 PROCEDURE — 3074F SYST BP LT 130 MM HG: CPT | Mod: CPTII,S$GLB,, | Performed by: FAMILY MEDICINE

## 2021-07-28 PROCEDURE — 99213 OFFICE O/P EST LOW 20 MIN: CPT | Mod: S$GLB,,, | Performed by: FAMILY MEDICINE

## 2021-07-28 PROCEDURE — 1126F AMNT PAIN NOTED NONE PRSNT: CPT | Mod: CPTII,S$GLB,, | Performed by: FAMILY MEDICINE

## 2021-07-28 PROCEDURE — 3074F PR MOST RECENT SYSTOLIC BLOOD PRESSURE < 130 MM HG: ICD-10-PCS | Mod: CPTII,S$GLB,, | Performed by: FAMILY MEDICINE

## 2021-07-28 PROCEDURE — 99213 PR OFFICE/OUTPT VISIT, EST, LEVL III, 20-29 MIN: ICD-10-PCS | Mod: S$GLB,,, | Performed by: FAMILY MEDICINE

## 2021-07-28 PROCEDURE — 1159F PR MEDICATION LIST DOCUMENTED IN MEDICAL RECORD: ICD-10-PCS | Mod: CPTII,S$GLB,, | Performed by: FAMILY MEDICINE

## 2021-07-28 PROCEDURE — 3288F FALL RISK ASSESSMENT DOCD: CPT | Mod: CPTII,S$GLB,, | Performed by: FAMILY MEDICINE

## 2021-07-28 PROCEDURE — 1101F PR PT FALLS ASSESS DOC 0-1 FALLS W/OUT INJ PAST YR: ICD-10-PCS | Mod: CPTII,S$GLB,, | Performed by: FAMILY MEDICINE

## 2021-07-28 PROCEDURE — 3078F PR MOST RECENT DIASTOLIC BLOOD PRESSURE < 80 MM HG: ICD-10-PCS | Mod: CPTII,S$GLB,, | Performed by: FAMILY MEDICINE

## 2021-07-28 PROCEDURE — 3078F DIAST BP <80 MM HG: CPT | Mod: CPTII,S$GLB,, | Performed by: FAMILY MEDICINE

## 2021-07-28 RX ORDER — MUPIROCIN 20 MG/G
OINTMENT TOPICAL 3 TIMES DAILY
Qty: 30 G | Refills: 1 | Status: SHIPPED | OUTPATIENT
Start: 2021-07-28 | End: 2022-04-19

## 2021-07-28 RX ORDER — CLINDAMYCIN HYDROCHLORIDE 300 MG/1
300 CAPSULE ORAL 3 TIMES DAILY
Qty: 30 CAPSULE | Refills: 0 | Status: SHIPPED | OUTPATIENT
Start: 2021-07-28 | End: 2021-09-29

## 2021-07-28 RX ORDER — TRAMADOL HYDROCHLORIDE 50 MG/1
50 TABLET ORAL EVERY 8 HOURS PRN
Qty: 30 TABLET | Refills: 0 | Status: SHIPPED | OUTPATIENT
Start: 2021-07-28 | End: 2022-04-19 | Stop reason: RX

## 2021-08-04 ENCOUNTER — OFFICE VISIT (OUTPATIENT)
Dept: PRIMARY CARE CLINIC | Facility: CLINIC | Age: 71
End: 2021-08-04
Payer: MEDICARE

## 2021-08-04 VITALS
HEART RATE: 90 BPM | BODY MASS INDEX: 39.17 KG/M2 | DIASTOLIC BLOOD PRESSURE: 56 MMHG | HEIGHT: 75 IN | OXYGEN SATURATION: 97 % | WEIGHT: 315 LBS | TEMPERATURE: 98 F | SYSTOLIC BLOOD PRESSURE: 116 MMHG

## 2021-08-04 DIAGNOSIS — E78.5 HYPERLIPIDEMIA, UNSPECIFIED HYPERLIPIDEMIA TYPE: ICD-10-CM

## 2021-08-04 DIAGNOSIS — Z86.79 HISTORY OF ATRIAL FIBRILLATION: ICD-10-CM

## 2021-08-04 DIAGNOSIS — R41.3 MEMORY LOSS: ICD-10-CM

## 2021-08-04 DIAGNOSIS — L98.9 SKIN LESION OF RIGHT LEG: Primary | ICD-10-CM

## 2021-08-04 DIAGNOSIS — I10 HYPERTENSION, UNSPECIFIED TYPE: ICD-10-CM

## 2021-08-04 DIAGNOSIS — E66.9 OBESITY, UNSPECIFIED CLASSIFICATION, UNSPECIFIED OBESITY TYPE, UNSPECIFIED WHETHER SERIOUS COMORBIDITY PRESENT: ICD-10-CM

## 2021-08-04 PROCEDURE — 1159F PR MEDICATION LIST DOCUMENTED IN MEDICAL RECORD: ICD-10-PCS | Mod: CPTII,S$GLB,, | Performed by: FAMILY MEDICINE

## 2021-08-04 PROCEDURE — 99213 OFFICE O/P EST LOW 20 MIN: CPT | Mod: S$GLB,,, | Performed by: FAMILY MEDICINE

## 2021-08-04 PROCEDURE — 1159F MED LIST DOCD IN RCRD: CPT | Mod: CPTII,S$GLB,, | Performed by: FAMILY MEDICINE

## 2021-08-04 PROCEDURE — 1101F PR PT FALLS ASSESS DOC 0-1 FALLS W/OUT INJ PAST YR: ICD-10-PCS | Mod: CPTII,S$GLB,, | Performed by: FAMILY MEDICINE

## 2021-08-04 PROCEDURE — 3008F PR BODY MASS INDEX (BMI) DOCUMENTED: ICD-10-PCS | Mod: CPTII,S$GLB,, | Performed by: FAMILY MEDICINE

## 2021-08-04 PROCEDURE — 99999 PR PBB SHADOW E&M-EST. PATIENT-LVL IV: ICD-10-PCS | Mod: PBBFAC,,, | Performed by: FAMILY MEDICINE

## 2021-08-04 PROCEDURE — 3078F DIAST BP <80 MM HG: CPT | Mod: CPTII,S$GLB,, | Performed by: FAMILY MEDICINE

## 2021-08-04 PROCEDURE — 99213 PR OFFICE/OUTPT VISIT, EST, LEVL III, 20-29 MIN: ICD-10-PCS | Mod: S$GLB,,, | Performed by: FAMILY MEDICINE

## 2021-08-04 PROCEDURE — 3008F BODY MASS INDEX DOCD: CPT | Mod: CPTII,S$GLB,, | Performed by: FAMILY MEDICINE

## 2021-08-04 PROCEDURE — 3288F PR FALLS RISK ASSESSMENT DOCUMENTED: ICD-10-PCS | Mod: CPTII,S$GLB,, | Performed by: FAMILY MEDICINE

## 2021-08-04 PROCEDURE — 1125F PR PAIN SEVERITY QUANTIFIED, PAIN PRESENT: ICD-10-PCS | Mod: CPTII,S$GLB,, | Performed by: FAMILY MEDICINE

## 2021-08-04 PROCEDURE — 3078F PR MOST RECENT DIASTOLIC BLOOD PRESSURE < 80 MM HG: ICD-10-PCS | Mod: CPTII,S$GLB,, | Performed by: FAMILY MEDICINE

## 2021-08-04 PROCEDURE — 1125F AMNT PAIN NOTED PAIN PRSNT: CPT | Mod: CPTII,S$GLB,, | Performed by: FAMILY MEDICINE

## 2021-08-04 PROCEDURE — 3288F FALL RISK ASSESSMENT DOCD: CPT | Mod: CPTII,S$GLB,, | Performed by: FAMILY MEDICINE

## 2021-08-04 PROCEDURE — 1101F PT FALLS ASSESS-DOCD LE1/YR: CPT | Mod: CPTII,S$GLB,, | Performed by: FAMILY MEDICINE

## 2021-08-04 PROCEDURE — 3074F SYST BP LT 130 MM HG: CPT | Mod: CPTII,S$GLB,, | Performed by: FAMILY MEDICINE

## 2021-08-04 PROCEDURE — 3074F PR MOST RECENT SYSTOLIC BLOOD PRESSURE < 130 MM HG: ICD-10-PCS | Mod: CPTII,S$GLB,, | Performed by: FAMILY MEDICINE

## 2021-08-04 PROCEDURE — 99999 PR PBB SHADOW E&M-EST. PATIENT-LVL IV: CPT | Mod: PBBFAC,,, | Performed by: FAMILY MEDICINE

## 2021-08-04 RX ORDER — CEPHALEXIN 250 MG/1
CAPSULE ORAL
COMMUNITY
Start: 2021-05-13 | End: 2021-09-29

## 2021-08-04 RX ORDER — DOXYCYCLINE 100 MG/1
CAPSULE ORAL
COMMUNITY
Start: 2021-02-25 | End: 2021-09-29

## 2021-08-07 RX ORDER — ATORVASTATIN CALCIUM 20 MG/1
20 TABLET, FILM COATED ORAL DAILY
Qty: 90 TABLET | Refills: 1 | Status: SHIPPED | OUTPATIENT
Start: 2021-08-07 | End: 2022-04-19 | Stop reason: SDUPTHER

## 2021-08-10 ENCOUNTER — TELEPHONE (OUTPATIENT)
Dept: PRIMARY CARE CLINIC | Facility: CLINIC | Age: 71
End: 2021-08-10

## 2021-08-17 ENCOUNTER — PATIENT OUTREACH (OUTPATIENT)
Dept: ADMINISTRATIVE | Facility: HOSPITAL | Age: 71
End: 2021-08-17

## 2021-08-18 ENCOUNTER — OFFICE VISIT (OUTPATIENT)
Dept: PRIMARY CARE CLINIC | Facility: CLINIC | Age: 71
End: 2021-08-18
Payer: MEDICARE

## 2021-08-18 VITALS
TEMPERATURE: 98 F | SYSTOLIC BLOOD PRESSURE: 116 MMHG | HEIGHT: 75 IN | HEART RATE: 97 BPM | BODY MASS INDEX: 39.17 KG/M2 | RESPIRATION RATE: 18 BRPM | WEIGHT: 315 LBS | OXYGEN SATURATION: 96 % | DIASTOLIC BLOOD PRESSURE: 72 MMHG

## 2021-08-18 DIAGNOSIS — R42 VERTIGO: Primary | ICD-10-CM

## 2021-08-18 DIAGNOSIS — E78.5 HYPERLIPIDEMIA, UNSPECIFIED HYPERLIPIDEMIA TYPE: ICD-10-CM

## 2021-08-18 DIAGNOSIS — L98.9 SKIN LESION OF RIGHT LEG: ICD-10-CM

## 2021-08-18 DIAGNOSIS — Z86.79 HISTORY OF ATRIAL FIBRILLATION: ICD-10-CM

## 2021-08-18 DIAGNOSIS — L98.9 SKIN LESION: ICD-10-CM

## 2021-08-18 DIAGNOSIS — E66.9 OBESITY, UNSPECIFIED CLASSIFICATION, UNSPECIFIED OBESITY TYPE, UNSPECIFIED WHETHER SERIOUS COMORBIDITY PRESENT: ICD-10-CM

## 2021-08-18 DIAGNOSIS — I10 HYPERTENSION, UNSPECIFIED TYPE: ICD-10-CM

## 2021-08-18 PROCEDURE — 99213 OFFICE O/P EST LOW 20 MIN: CPT | Mod: S$GLB,,, | Performed by: FAMILY MEDICINE

## 2021-08-18 PROCEDURE — 99999 PR PBB SHADOW E&M-EST. PATIENT-LVL IV: CPT | Mod: PBBFAC,,, | Performed by: FAMILY MEDICINE

## 2021-08-18 PROCEDURE — 99999 PR PBB SHADOW E&M-EST. PATIENT-LVL IV: ICD-10-PCS | Mod: PBBFAC,,, | Performed by: FAMILY MEDICINE

## 2021-08-18 PROCEDURE — 1126F PR PAIN SEVERITY QUANTIFIED, NO PAIN PRESENT: ICD-10-PCS | Mod: CPTII,S$GLB,, | Performed by: FAMILY MEDICINE

## 2021-08-18 PROCEDURE — 1159F MED LIST DOCD IN RCRD: CPT | Mod: CPTII,S$GLB,, | Performed by: FAMILY MEDICINE

## 2021-08-18 PROCEDURE — 3288F FALL RISK ASSESSMENT DOCD: CPT | Mod: CPTII,S$GLB,, | Performed by: FAMILY MEDICINE

## 2021-08-18 PROCEDURE — 3074F PR MOST RECENT SYSTOLIC BLOOD PRESSURE < 130 MM HG: ICD-10-PCS | Mod: CPTII,S$GLB,, | Performed by: FAMILY MEDICINE

## 2021-08-18 PROCEDURE — 3008F BODY MASS INDEX DOCD: CPT | Mod: CPTII,S$GLB,, | Performed by: FAMILY MEDICINE

## 2021-08-18 PROCEDURE — 3078F PR MOST RECENT DIASTOLIC BLOOD PRESSURE < 80 MM HG: ICD-10-PCS | Mod: CPTII,S$GLB,, | Performed by: FAMILY MEDICINE

## 2021-08-18 PROCEDURE — 99213 PR OFFICE/OUTPT VISIT, EST, LEVL III, 20-29 MIN: ICD-10-PCS | Mod: S$GLB,,, | Performed by: FAMILY MEDICINE

## 2021-08-18 PROCEDURE — 3008F PR BODY MASS INDEX (BMI) DOCUMENTED: ICD-10-PCS | Mod: CPTII,S$GLB,, | Performed by: FAMILY MEDICINE

## 2021-08-18 PROCEDURE — 1101F PT FALLS ASSESS-DOCD LE1/YR: CPT | Mod: CPTII,S$GLB,, | Performed by: FAMILY MEDICINE

## 2021-08-18 PROCEDURE — 1101F PR PT FALLS ASSESS DOC 0-1 FALLS W/OUT INJ PAST YR: ICD-10-PCS | Mod: CPTII,S$GLB,, | Performed by: FAMILY MEDICINE

## 2021-08-18 PROCEDURE — 1126F AMNT PAIN NOTED NONE PRSNT: CPT | Mod: CPTII,S$GLB,, | Performed by: FAMILY MEDICINE

## 2021-08-18 PROCEDURE — 3288F PR FALLS RISK ASSESSMENT DOCUMENTED: ICD-10-PCS | Mod: CPTII,S$GLB,, | Performed by: FAMILY MEDICINE

## 2021-08-18 PROCEDURE — 3078F DIAST BP <80 MM HG: CPT | Mod: CPTII,S$GLB,, | Performed by: FAMILY MEDICINE

## 2021-08-18 PROCEDURE — 1159F PR MEDICATION LIST DOCUMENTED IN MEDICAL RECORD: ICD-10-PCS | Mod: CPTII,S$GLB,, | Performed by: FAMILY MEDICINE

## 2021-08-18 PROCEDURE — 3074F SYST BP LT 130 MM HG: CPT | Mod: CPTII,S$GLB,, | Performed by: FAMILY MEDICINE

## 2021-08-18 RX ORDER — MECLIZINE HYDROCHLORIDE 25 MG/1
TABLET ORAL
Qty: 30 TABLET | Refills: 0 | Status: SHIPPED | OUTPATIENT
Start: 2021-08-18 | End: 2022-04-19 | Stop reason: SDUPTHER

## 2021-09-29 ENCOUNTER — OFFICE VISIT (OUTPATIENT)
Dept: PRIMARY CARE CLINIC | Facility: CLINIC | Age: 71
End: 2021-09-29
Payer: MEDICARE

## 2021-09-29 VITALS
RESPIRATION RATE: 18 BRPM | BODY MASS INDEX: 39.17 KG/M2 | DIASTOLIC BLOOD PRESSURE: 70 MMHG | OXYGEN SATURATION: 96 % | HEART RATE: 96 BPM | WEIGHT: 315 LBS | HEIGHT: 75 IN | SYSTOLIC BLOOD PRESSURE: 116 MMHG

## 2021-09-29 DIAGNOSIS — L98.9 SKIN LESION: Primary | ICD-10-CM

## 2021-09-29 DIAGNOSIS — M19.90 OSTEOARTHRITIS, UNSPECIFIED OSTEOARTHRITIS TYPE, UNSPECIFIED SITE: ICD-10-CM

## 2021-09-29 DIAGNOSIS — G47.33 OSA (OBSTRUCTIVE SLEEP APNEA): ICD-10-CM

## 2021-09-29 DIAGNOSIS — E66.9 OBESITY, UNSPECIFIED CLASSIFICATION, UNSPECIFIED OBESITY TYPE, UNSPECIFIED WHETHER SERIOUS COMORBIDITY PRESENT: ICD-10-CM

## 2021-09-29 DIAGNOSIS — I10 HYPERTENSION, UNSPECIFIED TYPE: ICD-10-CM

## 2021-09-29 DIAGNOSIS — Z86.79 HISTORY OF ATRIAL FIBRILLATION: ICD-10-CM

## 2021-09-29 DIAGNOSIS — R41.3 MEMORY LOSS: ICD-10-CM

## 2021-09-29 DIAGNOSIS — E78.5 HYPERLIPIDEMIA, UNSPECIFIED HYPERLIPIDEMIA TYPE: ICD-10-CM

## 2021-09-29 PROCEDURE — 3074F SYST BP LT 130 MM HG: CPT | Mod: CPTII,S$GLB,, | Performed by: FAMILY MEDICINE

## 2021-09-29 PROCEDURE — 99213 OFFICE O/P EST LOW 20 MIN: CPT | Mod: S$GLB,,, | Performed by: FAMILY MEDICINE

## 2021-09-29 PROCEDURE — 1101F PR PT FALLS ASSESS DOC 0-1 FALLS W/OUT INJ PAST YR: ICD-10-PCS | Mod: CPTII,S$GLB,, | Performed by: FAMILY MEDICINE

## 2021-09-29 PROCEDURE — 1159F MED LIST DOCD IN RCRD: CPT | Mod: CPTII,S$GLB,, | Performed by: FAMILY MEDICINE

## 2021-09-29 PROCEDURE — 1101F PT FALLS ASSESS-DOCD LE1/YR: CPT | Mod: CPTII,S$GLB,, | Performed by: FAMILY MEDICINE

## 2021-09-29 PROCEDURE — 3008F PR BODY MASS INDEX (BMI) DOCUMENTED: ICD-10-PCS | Mod: CPTII,S$GLB,, | Performed by: FAMILY MEDICINE

## 2021-09-29 PROCEDURE — 3288F PR FALLS RISK ASSESSMENT DOCUMENTED: ICD-10-PCS | Mod: CPTII,S$GLB,, | Performed by: FAMILY MEDICINE

## 2021-09-29 PROCEDURE — 99213 PR OFFICE/OUTPT VISIT, EST, LEVL III, 20-29 MIN: ICD-10-PCS | Mod: S$GLB,,, | Performed by: FAMILY MEDICINE

## 2021-09-29 PROCEDURE — 3008F BODY MASS INDEX DOCD: CPT | Mod: CPTII,S$GLB,, | Performed by: FAMILY MEDICINE

## 2021-09-29 PROCEDURE — 1126F AMNT PAIN NOTED NONE PRSNT: CPT | Mod: CPTII,S$GLB,, | Performed by: FAMILY MEDICINE

## 2021-09-29 PROCEDURE — 1159F PR MEDICATION LIST DOCUMENTED IN MEDICAL RECORD: ICD-10-PCS | Mod: CPTII,S$GLB,, | Performed by: FAMILY MEDICINE

## 2021-09-29 PROCEDURE — 99999 PR PBB SHADOW E&M-EST. PATIENT-LVL III: CPT | Mod: PBBFAC,,, | Performed by: FAMILY MEDICINE

## 2021-09-29 PROCEDURE — 3078F PR MOST RECENT DIASTOLIC BLOOD PRESSURE < 80 MM HG: ICD-10-PCS | Mod: CPTII,S$GLB,, | Performed by: FAMILY MEDICINE

## 2021-09-29 PROCEDURE — 3078F DIAST BP <80 MM HG: CPT | Mod: CPTII,S$GLB,, | Performed by: FAMILY MEDICINE

## 2021-09-29 PROCEDURE — 1126F PR PAIN SEVERITY QUANTIFIED, NO PAIN PRESENT: ICD-10-PCS | Mod: CPTII,S$GLB,, | Performed by: FAMILY MEDICINE

## 2021-09-29 PROCEDURE — 3074F PR MOST RECENT SYSTOLIC BLOOD PRESSURE < 130 MM HG: ICD-10-PCS | Mod: CPTII,S$GLB,, | Performed by: FAMILY MEDICINE

## 2021-09-29 PROCEDURE — 3288F FALL RISK ASSESSMENT DOCD: CPT | Mod: CPTII,S$GLB,, | Performed by: FAMILY MEDICINE

## 2021-09-29 PROCEDURE — 99999 PR PBB SHADOW E&M-EST. PATIENT-LVL III: ICD-10-PCS | Mod: PBBFAC,,, | Performed by: FAMILY MEDICINE

## 2021-10-07 ENCOUNTER — IMMUNIZATION (OUTPATIENT)
Dept: PRIMARY CARE CLINIC | Facility: CLINIC | Age: 71
End: 2021-10-07
Payer: MEDICARE

## 2021-10-07 DIAGNOSIS — Z23 NEED FOR VACCINATION: Primary | ICD-10-CM

## 2021-10-07 PROCEDURE — 91301 COVID-19, MRNA, LNP-S, PF, 100 MCG/0.5 ML DOSE VACCINE: CPT | Mod: PBBFAC | Performed by: EMERGENCY MEDICINE

## 2021-10-29 NOTE — ANESTHESIA PREPROCEDURE EVALUATION
03/21/2018  Shon Asencio is a 66 y.o., male.    [facility] Anesthesia Evaluation    I have reviewed the Patient Summary Reports.    I have reviewed the Nursing Notes.   I have reviewed the Medications.     Review of Systems  Anesthesia Hx:  No problems with previous Anesthesia    Social:  Former Smoker, Social Alcohol Use    Hematology/Oncology:  Hematology Normal   Oncology Normal     EENT/Dental:EENT/Dental Normal   Cardiovascular:   Exercise tolerance: good Dysrhythmias atrial fibrillation    Renal/:  Renal/ Normal     Hepatic/GI:  Hepatic/GI Normal    Neurological:  Neurology Normal    Endocrine:  Endocrine Normal    Dermatological:  Skin Normal    Psych:  Psychiatric Normal           Physical Exam  General:  Morbid Obesity    Airway/Jaw/Neck:  Airway Findings: Mouth Opening: Normal Tongue: Large  General Airway Assessment: Adult, Possible difficult mask airway, Possible difficult intubation  Mallampati: III  TM Distance: 4 - 6 cm      Dental:  Dental Findings: In tact, upper front caps        Mental Status:  Mental Status Findings:  Cooperative, Alert and Oriented         Anesthesia Plan  Type of Anesthesia, risks & benefits discussed:  Anesthesia Type:  general  Patient's Preference:   Intra-op Monitoring Plan:   Intra-op Monitoring Plan Comments:   Post Op Pain Control Plan:   Post Op Pain Control Plan Comments:   Induction:    Beta Blocker:         Informed Consent: Patient understands risks and agrees with Anesthesia plan.  Questions answered. Anesthesia consent signed with patient.  ASA Score: 3     Day of Surgery Review of History & Physical:    H&P update referred to the surgeon.     Anesthesia Plan Notes: Cleared by Dr. Tang. Labs, EKG and ECHO are all normal.        Ready For Surgery From Anesthesia Perspective.        Detail Level: Detailed Quality 130: Documentation Of Current Medications In The Medical Record: Current Medications Documented Quality 110: Preventive Care And Screening: Influenza Immunization: Influenza Immunization not Administered for Documented Reasons. Quality 111:Pneumonia Vaccination Status For Older Adults: Pneumococcal Vaccination not Administered or Previously Received, Reason not Otherwise Specified

## 2022-02-10 NOTE — TELEPHONE ENCOUNTER
----- Message from Ana Laura Ny sent at 2/10/2022  9:42 AM CST -----  Contact: Patient, [phone]  Requesting an RX refill or new RX.  Is this a refill or new RX: Refill  RX name and strength (copy/paste from chart):  Jenny with [facility] phone [phone] fax [phone]  Is this a 30 day or 90 day RX: 90  Pharmacy name and phone # (copy/paste from chart):    [facility], [place] - [address]  [address] [facility] [place] [address]  Phone: [phone] Fax: [phone]  The doctors have asked that we provide their patients with the following 2 reminders -- prescription refills can take up to 72 hours, and a friendly reminder that in the future you can use your [facility] account to request refills: Yes

## 2022-02-11 RX ORDER — CELECOXIB 100 MG/1
100 CAPSULE ORAL DAILY
Qty: 90 CAPSULE | Refills: 3 | Status: SHIPPED | OUTPATIENT
Start: 2022-02-11 | End: 2022-09-08 | Stop reason: SDUPTHER

## 2022-04-19 ENCOUNTER — OFFICE VISIT (OUTPATIENT)
Dept: PRIMARY CARE CLINIC | Facility: CLINIC | Age: 72
End: 2022-04-19
Payer: MEDICARE

## 2022-04-19 VITALS
DIASTOLIC BLOOD PRESSURE: 84 MMHG | SYSTOLIC BLOOD PRESSURE: 128 MMHG | OXYGEN SATURATION: 95 % | HEIGHT: 75 IN | RESPIRATION RATE: 18 BRPM | WEIGHT: 315 LBS | HEART RATE: 76 BPM | BODY MASS INDEX: 39.17 KG/M2

## 2022-04-19 DIAGNOSIS — M54.16 LUMBAR RADICULOPATHY: ICD-10-CM

## 2022-04-19 DIAGNOSIS — Z86.79 HISTORY OF ATRIAL FIBRILLATION: ICD-10-CM

## 2022-04-19 DIAGNOSIS — G47.33 OSA (OBSTRUCTIVE SLEEP APNEA): ICD-10-CM

## 2022-04-19 DIAGNOSIS — I10 HYPERTENSION, UNSPECIFIED TYPE: Primary | ICD-10-CM

## 2022-04-19 DIAGNOSIS — R07.89 ATYPICAL CHEST PAIN: ICD-10-CM

## 2022-04-19 DIAGNOSIS — E78.5 HYPERLIPIDEMIA, UNSPECIFIED HYPERLIPIDEMIA TYPE: ICD-10-CM

## 2022-04-19 PROCEDURE — 1101F PT FALLS ASSESS-DOCD LE1/YR: CPT | Mod: CPTII,S$GLB,, | Performed by: FAMILY MEDICINE

## 2022-04-19 PROCEDURE — 1101F PR PT FALLS ASSESS DOC 0-1 FALLS W/OUT INJ PAST YR: ICD-10-PCS | Mod: CPTII,S$GLB,, | Performed by: FAMILY MEDICINE

## 2022-04-19 PROCEDURE — 3074F SYST BP LT 130 MM HG: CPT | Mod: CPTII,S$GLB,, | Performed by: FAMILY MEDICINE

## 2022-04-19 PROCEDURE — 1159F MED LIST DOCD IN RCRD: CPT | Mod: CPTII,S$GLB,, | Performed by: FAMILY MEDICINE

## 2022-04-19 PROCEDURE — 3074F PR MOST RECENT SYSTOLIC BLOOD PRESSURE < 130 MM HG: ICD-10-PCS | Mod: CPTII,S$GLB,, | Performed by: FAMILY MEDICINE

## 2022-04-19 PROCEDURE — 99214 PR OFFICE/OUTPT VISIT, EST, LEVL IV, 30-39 MIN: ICD-10-PCS | Mod: S$GLB,,, | Performed by: FAMILY MEDICINE

## 2022-04-19 PROCEDURE — 1126F AMNT PAIN NOTED NONE PRSNT: CPT | Mod: CPTII,S$GLB,, | Performed by: FAMILY MEDICINE

## 2022-04-19 PROCEDURE — 3008F BODY MASS INDEX DOCD: CPT | Mod: CPTII,S$GLB,, | Performed by: FAMILY MEDICINE

## 2022-04-19 PROCEDURE — 99999 PR PBB SHADOW E&M-EST. PATIENT-LVL III: CPT | Mod: PBBFAC,,, | Performed by: FAMILY MEDICINE

## 2022-04-19 PROCEDURE — 99999 PR PBB SHADOW E&M-EST. PATIENT-LVL III: ICD-10-PCS | Mod: PBBFAC,,, | Performed by: FAMILY MEDICINE

## 2022-04-19 PROCEDURE — 1159F PR MEDICATION LIST DOCUMENTED IN MEDICAL RECORD: ICD-10-PCS | Mod: CPTII,S$GLB,, | Performed by: FAMILY MEDICINE

## 2022-04-19 PROCEDURE — 3008F PR BODY MASS INDEX (BMI) DOCUMENTED: ICD-10-PCS | Mod: CPTII,S$GLB,, | Performed by: FAMILY MEDICINE

## 2022-04-19 PROCEDURE — 3288F PR FALLS RISK ASSESSMENT DOCUMENTED: ICD-10-PCS | Mod: CPTII,S$GLB,, | Performed by: FAMILY MEDICINE

## 2022-04-19 PROCEDURE — 99214 OFFICE O/P EST MOD 30 MIN: CPT | Mod: S$GLB,,, | Performed by: FAMILY MEDICINE

## 2022-04-19 PROCEDURE — 1126F PR PAIN SEVERITY QUANTIFIED, NO PAIN PRESENT: ICD-10-PCS | Mod: CPTII,S$GLB,, | Performed by: FAMILY MEDICINE

## 2022-04-19 PROCEDURE — 3288F FALL RISK ASSESSMENT DOCD: CPT | Mod: CPTII,S$GLB,, | Performed by: FAMILY MEDICINE

## 2022-04-19 PROCEDURE — 3079F DIAST BP 80-89 MM HG: CPT | Mod: CPTII,S$GLB,, | Performed by: FAMILY MEDICINE

## 2022-04-19 PROCEDURE — 3079F PR MOST RECENT DIASTOLIC BLOOD PRESSURE 80-89 MM HG: ICD-10-PCS | Mod: CPTII,S$GLB,, | Performed by: FAMILY MEDICINE

## 2022-04-19 RX ORDER — METOPROLOL SUCCINATE 25 MG/1
25 TABLET, EXTENDED RELEASE ORAL DAILY
Qty: 135 TABLET | Refills: 1 | Status: SHIPPED | OUTPATIENT
Start: 2022-04-19 | End: 2022-09-08 | Stop reason: SDUPTHER

## 2022-04-19 RX ORDER — DABIGATRAN ETEXILATE MESYLATE 150 MG/1
150 CAPSULE ORAL 2 TIMES DAILY
Qty: 180 CAPSULE | Refills: 1 | Status: SHIPPED | OUTPATIENT
Start: 2022-04-19 | End: 2022-09-08 | Stop reason: SDUPTHER

## 2022-04-19 RX ORDER — TRAMADOL HYDROCHLORIDE 50 MG/1
50 TABLET ORAL EVERY 6 HOURS PRN
Qty: 30 EACH | Refills: 1 | Status: SHIPPED | OUTPATIENT
Start: 2022-04-19 | End: 2023-05-02 | Stop reason: SDUPTHER

## 2022-04-19 RX ORDER — FUROSEMIDE 40 MG/1
TABLET ORAL
COMMUNITY
Start: 2021-06-22 | End: 2022-04-28 | Stop reason: SDUPTHER

## 2022-04-19 RX ORDER — MECLIZINE HYDROCHLORIDE 25 MG/1
TABLET ORAL
Qty: 30 TABLET | Refills: 0 | Status: SHIPPED | OUTPATIENT
Start: 2022-04-19 | End: 2022-09-08 | Stop reason: SDUPTHER

## 2022-04-19 RX ORDER — TRAMADOL HYDROCHLORIDE 50 MG/1
50 TABLET ORAL EVERY 8 HOURS PRN
Qty: 30 TABLET | Refills: 0 | Status: CANCELLED | OUTPATIENT
Start: 2022-04-19

## 2022-04-19 RX ORDER — ATORVASTATIN CALCIUM 20 MG/1
20 TABLET, FILM COATED ORAL DAILY
Qty: 90 TABLET | Refills: 1 | Status: SHIPPED | OUTPATIENT
Start: 2022-04-19 | End: 2022-09-08 | Stop reason: SDUPTHER

## 2022-04-19 NOTE — PROGRESS NOTES
HPI:  71-year-old male--in for six-month checkup--lesion on back of the leg is healed--seen in the emergency room [id number] for atypical chest pain--left upper chest area--quality aching--- severity--9/10--frequency continuous--woke up feeling fine pain started about 10:30 a.m.--went to the emergency room 11:30 AM --slight nausea--no shortness of birth--no diaphoresis.  History of hypertension hyperlipidemia atrial fibrillation--in emergency room lab was done CBCs CMP BNP troponin chest x-ray EKG--BMP slightly increased suggesting slight congestive heart failure chest x-ray shows site increase interstitial markings suggestive of congestive heart failure EKG showed atrial fibrillation with heart rate 104 labeled rapid ventricular response.  Troponin was neg told nothing acutely wrong . Told ?? Muscular.       Got Lab for Dr. Tang fall several days ago had lab sent here           ROS:    Skin: no psoriasis, eczema, skin cancer--skin lesion right lower leg --healed --gone   HEENT: no HA  , no ocular pain, blurred vision, diplopia, epistaxis, hoarseness change in voice, + hypothyroidism  Lung: No pneumonia, asthma, Tb, wheezing, SOB, no smoking  Heart: No chest pain, ankle edema, palpitations, MI, barby murmur,+ hypertension,no  Hyperlipidemia--no stent bypass arrhythmia  Abdomen: No nausea, vomiting, diarrhea, constipation, ulcers, hepatitis, gallbladder disease, melena, hematochezia, hematemesis  : no renal disease, stones --hx UTI last visit   GYN last mammogram done Nov--problem with machine--got letter needed to have a repeated but pandemic her-- no PAP long time no S&S    MS: no fractures, +O/A--both knees- little better  no lupus, rheumatoid, gout right shoulder pain comes and goes some days worse than others using Aspercreme  Neuro: no dizziness--, no LOC, seizures   No diabetes, no anemia, no anxiety, no n depression prefers no medication tries to get over things on her own  , 2 children, lives with   --not working times 41 years  Objective:   Physical Exam:    General: Well nourished, well developed, no acute distress + overweight  Skin: lesion right lower leg--good eschar--no erythema swelling or discharge  HEENT: Eyes PERRLA, EOM intact, nose clear  throat nonerythematous ears TMs clear  NECK: Supple, no bruits, No JVD, no nodes  Lungs: Clear, no rales, rhonchi, wheezing   Heart: Regular rate and rhythm, no murmurs, gallops, or rubs  Abdomen: flat, bowel sounds positive, no tenderness, or organomegaly  MS:  Pain in knees bilaterally--difficulty getting up from a chair due to pain in knees--flexion extension popping sensation right knee crepitus in the knees bilaterally pain with squatting can only squat [facility] down able ambulate but some disco  Neuro: Alert, CN intact, oriented X 3 Romberg negative heel-toe intact  Extremities: No cyanosis, clubbing, or edema         Assessment:       1. Hyperlipidemia, unspecified hyperlipidemia type    2. Essential hypertension        Plan:       Hyperlipidemia, unspecified hyperlipidemia type    Essential hypertension  -     lisinopriL (PRINIVIL,ZESTRIL) 20 MG tablet; Take 1 tablet (20 mg total) by mouth once daily.  Dispense: 30 tablet; Refill: 0  History of atrial fibrillation  Chronic back pain with new exacerbation  Lumbar radiculopathy  Morbid obesity  Osteoarthritis  Obstructive sleep apnea      Main Reason here-  Six-month checkup  Follow-up emergency room visit 03/16/2022 had atypical chest pain--lab done CBCs CMP BNP troponin chest x-ray EKG--BMP slightly elevated 111 suggestive CHF--chest x-ray with increased interstitial markings suggesting CHF--EKG with atrial fib with rapid ventricular response 104--told had muscular component to chest pain no cardiac disease at this time has appoint with Dr. Tang--had lab done 2-3 weeks ago for Dr. aTng supposed to be sent here  Skin lesion right lower leg --totally healed  History lumbosacral strain--had  nerve block that lasted 2 years--going to [place] needs some Ultram for trip to see neurosurgeon for possible epidural steroid injection again   Other medical issues   bilateral osteoarthritis knees --x-ray both knees--see DR Verdugo --?  Inject both knees--discuss knee replacement  Hypertension/hyperlipidemia-- Low-sodium low-fat diet/exercise/try maintain  ideal body weight--Norvasc and Zestoretic and Micro-K for blood pressure Lipitor for hyperlipidemia  Hypothyroidism on Synthroid  Lab done by Dr. Tang  Health maintenance colonoscopy  Six months refills all medications

## 2022-04-28 DIAGNOSIS — M25.471 EDEMA OF BOTH ANKLES: ICD-10-CM

## 2022-04-28 DIAGNOSIS — M25.472 EDEMA OF BOTH ANKLES: ICD-10-CM

## 2022-04-28 DIAGNOSIS — I10 HYPERTENSION, UNSPECIFIED TYPE: Primary | ICD-10-CM

## 2022-04-28 NOTE — TELEPHONE ENCOUNTER
I spoke with the patient and he wanted to see if we can refill his Lasix. His ankles have been swelling up

## 2022-04-28 NOTE — TELEPHONE ENCOUNTER
----- Message from Shelly Hussein sent at 4/28/2022 11:29 AM CDT -----  Contact: Self/[phone]  Pt said that he Is calling in regards to needing to speak with the nurse about some medications that the doctor ordered for him on last week. Please advise

## 2022-04-28 NOTE — TELEPHONE ENCOUNTER
Care Due:                  Date            Visit Type   Department     Provider  --------------------------------------------------------------------------------                                EP -                              PRIMARY [facility]  Last Visit: 04-      CARE ([facility])   PRIMARY CARE   Driss Bradford                              EP - [facility]IMARY [facility]  Next Visit: 10-      CARE ([facility])   PRIMARY CARE   Driss Bradford                                                            Last  Test          Frequency    Reason                     Performed    Due Date  --------------------------------------------------------------------------------    Lipid Panel.  12 months..  atorvastatin.............  Not Found    Overdue    Powered by [employer]. Reference number: [id number].   4/28/2022 1:33:06 PM CDT

## 2022-04-29 RX ORDER — FUROSEMIDE 40 MG/1
40 TABLET ORAL DAILY PRN
Qty: 90 TABLET | Refills: 3 | Status: SHIPPED | OUTPATIENT
Start: 2022-04-29 | End: 2022-09-08 | Stop reason: SDUPTHER

## 2022-05-02 ENCOUNTER — TELEPHONE (OUTPATIENT)
Dept: PRIMARY CARE CLINIC | Facility: CLINIC | Age: 72
End: 2022-05-02
Payer: MEDICARE

## 2022-05-02 NOTE — TELEPHONE ENCOUNTER
----- Message from Lyla Singh sent at 5/2/2022  8:56 AM CDT -----  Contact: [phone]  Patient called, requested a courtesy call from Dr Bradford's ma in regards Friday's conversation. Please call and advise. Thank you

## 2022-05-02 NOTE — TELEPHONE ENCOUNTER
Returned patients call. Patient stated that the pharmacy didn't have the medication. I confirmed with patient that the medication was sent. He stated he was going to [facility] pharmacy and see what they say.

## 2022-05-05 ENCOUNTER — PATIENT MESSAGE (OUTPATIENT)
Dept: SMOKING CESSATION | Facility: CLINIC | Age: 72
End: 2022-05-05
Payer: MEDICARE

## 2022-08-30 NOTE — TELEPHONE ENCOUNTER
Care Due:                  Date            Visit Type   Department     Provider  --------------------------------------------------------------------------------                                EP -                              PRIMARY [facility]  Last Visit: 04-      CARE ([facility])   PRIMARY CARE   Driss Bradford                              EP - [facility]IMARY [facility]  Next Visit: 10-      CARE ([facility])   PRIMARY CARE   Driss Bradford                                                            Last  Test          Frequency    Reason                     Performed    Due Date  --------------------------------------------------------------------------------    Lipid Panel.  12 months..  atorvastatin.............  Not Found    Overdue    [employer] Embedded Care Gaps. Reference number: [id number]. 8/30/2022   12:44:38 PM CDT

## 2022-08-30 NOTE — TELEPHONE ENCOUNTER
----- Message from Kaylin Gonzalez sent at 8/30/2022 12:02 PM CDT -----  Regarding: refill - change  Contact: patient [phone]  Requesting an RX refill or new RX.  Is this a refill or new RX: refill  RX name and strength potassium chloride (KLOR-CON) 10 MEQ TbSR (Discontinued)    Is this a 30 day or 90 day RX: 90  Pharmacy name and phone # (     [id number] - [place] - [address] AT [facility] [address]  [address] [address]  Phone: [phone] Fax: [phone]    The doctors have asked that we provide their patients with the following 2 reminders -- prescription refills can take up to 72 hours, and a friendly reminder that in the future you can use your [facility] account to request refills: yes

## 2022-08-31 RX ORDER — POTASSIUM CHLORIDE 750 MG/1
CAPSULE, EXTENDED RELEASE ORAL
Qty: 30 CAPSULE | Refills: 5 | OUTPATIENT
Start: 2022-08-31

## 2022-09-01 NOTE — TELEPHONE ENCOUNTER
----- Message from Ana Laura Ny sent at 9/1/2022  8:45 AM CDT -----  Contact: Patient, [phone]  Calling to inquire about refill request for Rx  potassium chloride (KLOR-CON) 10 MEQ TbSR . Please call him. Thanks.

## 2022-09-01 NOTE — TELEPHONE ENCOUNTER
No new care gaps identified.  [facility] Embedded Care Gaps. Reference number: [id number]. 9/01/2022   10:39:24 AM CDT

## 2022-09-02 RX ORDER — POTASSIUM CHLORIDE 750 MG/1
CAPSULE, EXTENDED RELEASE ORAL
Qty: 90 CAPSULE | Refills: 0 | Status: SHIPPED | OUTPATIENT
Start: 2022-09-02 | End: 2022-09-08 | Stop reason: SDUPTHER

## 2022-09-08 DIAGNOSIS — M25.472 EDEMA OF BOTH ANKLES: ICD-10-CM

## 2022-09-08 DIAGNOSIS — E78.5 HYPERLIPIDEMIA, UNSPECIFIED HYPERLIPIDEMIA TYPE: ICD-10-CM

## 2022-09-08 DIAGNOSIS — I10 HYPERTENSION, UNSPECIFIED TYPE: ICD-10-CM

## 2022-09-08 DIAGNOSIS — M25.471 EDEMA OF BOTH ANKLES: ICD-10-CM

## 2022-09-08 RX ORDER — POTASSIUM CHLORIDE 750 MG/1
CAPSULE, EXTENDED RELEASE ORAL
Qty: 90 CAPSULE | Refills: 0 | Status: SHIPPED | OUTPATIENT
Start: 2022-09-08 | End: 2022-12-12 | Stop reason: SDUPTHER

## 2022-09-08 RX ORDER — METOPROLOL SUCCINATE 25 MG/1
25 TABLET, EXTENDED RELEASE ORAL DAILY
Qty: 135 TABLET | Refills: 1 | Status: SHIPPED | OUTPATIENT
Start: 2022-09-08 | End: 2022-11-08 | Stop reason: SDUPTHER

## 2022-09-08 RX ORDER — ATORVASTATIN CALCIUM 20 MG/1
20 TABLET, FILM COATED ORAL DAILY
Qty: 90 TABLET | Refills: 1 | Status: SHIPPED | OUTPATIENT
Start: 2022-09-08 | End: 2024-02-08

## 2022-09-08 RX ORDER — FUROSEMIDE 40 MG/1
40 TABLET ORAL DAILY PRN
Qty: 90 TABLET | Refills: 3 | Status: SHIPPED | OUTPATIENT
Start: 2022-09-08 | End: 2024-02-08

## 2022-09-08 RX ORDER — MECLIZINE HYDROCHLORIDE 25 MG/1
TABLET ORAL
Qty: 30 TABLET | Refills: 0 | Status: SHIPPED | OUTPATIENT
Start: 2022-09-08

## 2022-09-08 RX ORDER — CELECOXIB 100 MG/1
100 CAPSULE ORAL DAILY
Qty: 90 CAPSULE | Refills: 3 | Status: SHIPPED | OUTPATIENT
Start: 2022-09-08 | End: 2023-10-24 | Stop reason: SDUPTHER

## 2022-09-08 RX ORDER — DABIGATRAN ETEXILATE MESYLATE 150 MG/1
150 CAPSULE ORAL 2 TIMES DAILY
Qty: 180 CAPSULE | Refills: 1 | Status: SHIPPED | OUTPATIENT
Start: 2022-09-08 | End: 2023-07-12 | Stop reason: SDUPTHER

## 2022-09-08 NOTE — TELEPHONE ENCOUNTER
----- Message from Ana De León sent at 9/8/2022  8:20 AM CDT -----  Pt wants to change his pharmacy to the [facility] because [facility] is charging him and [facility] is free. Send for all meds until further notice.

## 2022-09-08 NOTE — TELEPHONE ENCOUNTER
No new care gaps identified.  [facility] Embedded Care Gaps. Reference number: [id number]. 9/08/2022   9:24:01 AM JASONT

## 2022-09-27 ENCOUNTER — PATIENT MESSAGE (OUTPATIENT)
Dept: PRIMARY CARE CLINIC | Facility: CLINIC | Age: 72
End: 2022-09-27
Payer: MEDICARE

## 2022-11-08 DIAGNOSIS — I10 HYPERTENSION, UNSPECIFIED TYPE: ICD-10-CM

## 2022-11-08 RX ORDER — METOPROLOL SUCCINATE 25 MG/1
25 TABLET, EXTENDED RELEASE ORAL DAILY
Qty: 135 TABLET | Refills: 1 | Status: SHIPPED | OUTPATIENT
Start: 2022-11-08 | End: 2022-11-09 | Stop reason: SDUPTHER

## 2022-11-08 NOTE — TELEPHONE ENCOUNTER
----- Message from Earline Chris sent at 11/8/2022  9:04 AM CST -----  Contact: [phone] Patient  Requesting an RX refill or new RX.  Is this a refill or new RX: new  RX name and strength (copy/paste from chart):  metoprolol succinate (TOPROL-XL) 25 MG 24 hr tablet  Is this a 30 day or 90 day RX:   Pharmacy name and phone # (copy/paste from chart):    [facility] - [address] [address]  Phone: [phone] Fax: [phone]     The doctors have asked that we provide their patients with the following 2 reminders -- prescription refills can take up to 72 hours, and a friendly reminder that in the future you can use your [facility] account to request refills: yes    Pt is requesting a call back from the nurse about the Rx being sent electronically.

## 2022-11-08 NOTE — TELEPHONE ENCOUNTER
No new care gaps identified.  [facility] Embedded Care Gaps. Reference number: [id number]. 11/08/2022   9:22:21 AM CST

## 2022-11-09 ENCOUNTER — TELEPHONE (OUTPATIENT)
Dept: SURGERY | Facility: CLINIC | Age: 72
End: 2022-11-09
Payer: MEDICARE

## 2022-11-09 ENCOUNTER — OFFICE VISIT (OUTPATIENT)
Dept: PRIMARY CARE CLINIC | Facility: CLINIC | Age: 72
End: 2022-11-09
Payer: MEDICARE

## 2022-11-09 VITALS
BODY MASS INDEX: 39.17 KG/M2 | SYSTOLIC BLOOD PRESSURE: 120 MMHG | HEIGHT: 75 IN | RESPIRATION RATE: 18 BRPM | HEART RATE: 55 BPM | DIASTOLIC BLOOD PRESSURE: 78 MMHG | TEMPERATURE: 97 F | OXYGEN SATURATION: 96 % | WEIGHT: 315 LBS

## 2022-11-09 DIAGNOSIS — Z12.11 SPECIAL SCREENING FOR MALIGNANT NEOPLASM OF COLON: ICD-10-CM

## 2022-11-09 DIAGNOSIS — I10 HYPERTENSION, UNSPECIFIED TYPE: ICD-10-CM

## 2022-11-09 DIAGNOSIS — R41.3 MEMORY LOSS: ICD-10-CM

## 2022-11-09 DIAGNOSIS — D69.6 THROMBOCYTOPENIA: ICD-10-CM

## 2022-11-09 DIAGNOSIS — Z12.2 ENCOUNTER FOR SCREENING FOR LUNG CANCER: ICD-10-CM

## 2022-11-09 DIAGNOSIS — M54.16 LUMBAR RADICULOPATHY: ICD-10-CM

## 2022-11-09 DIAGNOSIS — Z13.6 ENCOUNTER FOR SCREENING FOR ABDOMINAL AORTIC ANEURYSM (AAA) IN PATIENT 50 YEARS OF AGE OR OLDER WITHOUT OTHER RISK FACTORS FOR AAA: ICD-10-CM

## 2022-11-09 DIAGNOSIS — E78.2 MODERATE MIXED HYPERLIPIDEMIA NOT REQUIRING STATIN THERAPY: Primary | ICD-10-CM

## 2022-11-09 DIAGNOSIS — Z86.79 HISTORY OF ATRIAL FIBRILLATION: ICD-10-CM

## 2022-11-09 DIAGNOSIS — R42 VERTIGO: ICD-10-CM

## 2022-11-09 PROCEDURE — 3008F PR BODY MASS INDEX (BMI) DOCUMENTED: ICD-10-PCS | Mod: CPTII,S$GLB,, | Performed by: FAMILY MEDICINE

## 2022-11-09 PROCEDURE — 99999 PR PBB SHADOW E&M-EST. PATIENT-LVL IV: CPT | Mod: PBBFAC,,, | Performed by: FAMILY MEDICINE

## 2022-11-09 PROCEDURE — 1159F MED LIST DOCD IN RCRD: CPT | Mod: CPTII,S$GLB,, | Performed by: FAMILY MEDICINE

## 2022-11-09 PROCEDURE — 1126F AMNT PAIN NOTED NONE PRSNT: CPT | Mod: CPTII,S$GLB,, | Performed by: FAMILY MEDICINE

## 2022-11-09 PROCEDURE — 3078F PR MOST RECENT DIASTOLIC BLOOD PRESSURE < 80 MM HG: ICD-10-PCS | Mod: CPTII,S$GLB,, | Performed by: FAMILY MEDICINE

## 2022-11-09 PROCEDURE — 99214 PR OFFICE/OUTPT VISIT, EST, LEVL IV, 30-39 MIN: ICD-10-PCS | Mod: S$GLB,,, | Performed by: FAMILY MEDICINE

## 2022-11-09 PROCEDURE — 3074F SYST BP LT 130 MM HG: CPT | Mod: CPTII,S$GLB,, | Performed by: FAMILY MEDICINE

## 2022-11-09 PROCEDURE — 99214 OFFICE O/P EST MOD 30 MIN: CPT | Mod: S$GLB,,, | Performed by: FAMILY MEDICINE

## 2022-11-09 PROCEDURE — 3078F DIAST BP <80 MM HG: CPT | Mod: CPTII,S$GLB,, | Performed by: FAMILY MEDICINE

## 2022-11-09 PROCEDURE — 3008F BODY MASS INDEX DOCD: CPT | Mod: CPTII,S$GLB,, | Performed by: FAMILY MEDICINE

## 2022-11-09 PROCEDURE — 3044F HG A1C LEVEL LT 7.0%: CPT | Mod: CPTII,S$GLB,, | Performed by: FAMILY MEDICINE

## 2022-11-09 PROCEDURE — 1101F PT FALLS ASSESS-DOCD LE1/YR: CPT | Mod: CPTII,S$GLB,, | Performed by: FAMILY MEDICINE

## 2022-11-09 PROCEDURE — 1101F PR PT FALLS ASSESS DOC 0-1 FALLS W/OUT INJ PAST YR: ICD-10-PCS | Mod: CPTII,S$GLB,, | Performed by: FAMILY MEDICINE

## 2022-11-09 PROCEDURE — 3288F FALL RISK ASSESSMENT DOCD: CPT | Mod: CPTII,S$GLB,, | Performed by: FAMILY MEDICINE

## 2022-11-09 PROCEDURE — 1159F PR MEDICATION LIST DOCUMENTED IN MEDICAL RECORD: ICD-10-PCS | Mod: CPTII,S$GLB,, | Performed by: FAMILY MEDICINE

## 2022-11-09 PROCEDURE — 1126F PR PAIN SEVERITY QUANTIFIED, NO PAIN PRESENT: ICD-10-PCS | Mod: CPTII,S$GLB,, | Performed by: FAMILY MEDICINE

## 2022-11-09 PROCEDURE — 3044F PR MOST RECENT HEMOGLOBIN A1C LEVEL <7.0%: ICD-10-PCS | Mod: CPTII,S$GLB,, | Performed by: FAMILY MEDICINE

## 2022-11-09 PROCEDURE — 3074F PR MOST RECENT SYSTOLIC BLOOD PRESSURE < 130 MM HG: ICD-10-PCS | Mod: CPTII,S$GLB,, | Performed by: FAMILY MEDICINE

## 2022-11-09 PROCEDURE — 3288F PR FALLS RISK ASSESSMENT DOCUMENTED: ICD-10-PCS | Mod: CPTII,S$GLB,, | Performed by: FAMILY MEDICINE

## 2022-11-09 PROCEDURE — 99999 PR PBB SHADOW E&M-EST. PATIENT-LVL IV: ICD-10-PCS | Mod: PBBFAC,,, | Performed by: FAMILY MEDICINE

## 2022-11-09 RX ORDER — METOPROLOL SUCCINATE 25 MG/1
25 TABLET, EXTENDED RELEASE ORAL DAILY
Qty: 180 TABLET | Refills: 3 | Status: SHIPPED | OUTPATIENT
Start: 2022-11-09 | End: 2022-11-09 | Stop reason: SDUPTHER

## 2022-11-09 NOTE — TELEPHONE ENCOUNTER
This patient was called re: to a referral to have a Colonoscopy. Patient states I just had a Colonoscopy in 2019. Patient prefers to consult with PCP before scheduling.

## 2022-11-09 NOTE — PROGRESS NOTES
HPI:  71-year-old male--6 month  checkup --eating well--+BM--ambulation--needs refill        ROS:    Skin: no psoriasis, eczema, skin cancer  HEENT: no HA  , no ocular pain, blurred vision, diplopia, epistaxis, hoarseness change in voice, + hypothyroidism  Lung: No pneumonia, asthma, Tb, wheezing, SOB, no smoking  Heart: No chest pain, ankle edema, palpitations, MI, barby murmur,+ hypertension,no  Hyperlipidemia--no stent bypass arrhythmia  Abdomen: No nausea, vomiting, diarrhea, constipation, ulcers, hepatitis, gallbladder disease, melena, hematochezia, hematemesis  : no renal disease, stones --hx UTI last visit   GYN last mammogram done Nov--problem with machine--got letter needed to have a repeated but pandemic her-- no PAP long time no S&S    MS: no fractures, +O/A--both knees- little better  no lupus, rheumatoid, gout right shoulder pain comes and goes some days worse than others using Aspercreme  Neuro: no dizziness--, no LOC, seizures   No diabetes, no anemia, no anxiety, no n depression prefers no medication tries to get over things on her own  , 2 children, lives with  --not working times 41 years  Objective:   Physical Exam:    General: Well nourished, well developed, no acute distress + overweight  Skin: lesion right lower leg--good eschar--no erythema swelling or discharge  HEENT: Eyes PERRLA, EOM intact, nose clear  throat nonerythematous ears TMs clear  NECK: Supple, no bruits, No JVD, no nodes  Lungs: Clear, no rales, rhonchi, wheezing   Heart: Regular rate and rhythm, no murmurs, gallops, or rubs  Abdomen: flat, bowel sounds positive, no tenderness, or organomegaly  MS:  Pain in knees bilaterally--difficulty getting up from a chair due to pain in knees--flexion extension popping sensation right knee crepitus in the knees bilaterally pain with squatting can only squat [facility] down able ambulate but some disco  Neuro: Alert, CN intact, oriented X 3 Romberg negative heel-toe  intact  Extremities: No cyanosis, clubbing, or edema         Assessment:       1. Hyperlipidemia, unspecified hyperlipidemia type    2. Essential hypertension        Plan:       Hyperlipidemia, unspecified hyperlipidemia type    Essential hypertension  -     lisinopriL (PRINIVIL,ZESTRIL) 20 MG tablet; Take 1 tablet (20 mg total) by mouth once daily.  Dispense: 30 tablet; Refill: 0  History of atrial fibrillation  Chronic back pain with new exacerbation  Lumbar radiculopathy  Morbid obesity  Osteoarthritis  Obstructive sleep apnea      Main Reason here-  Six-month checkup  Hyper lipidemia--patient with cholesterol of 88 HDL 27 wants to be off atorvastatin for 6 months recheck labs see if able to get off atorvastatin  Hypertension metoprolol8  Peripheral edema on Lasix  History lumbosacral strain--had nerve block that lasted 2 years--going to [place] needs some Ultram for trip to see neurosurgeon for possible epidural steroid injection again   Other medical issues   bilateral osteoarthritis knees --x-ray both knees--see DR Verdugo --?  Inject both knees--discuss knee replacement  Hypertension/hyperlipidemia-- Low-sodium low-fat diet/exercise/try maintain  ideal body weight--Norvasc and Zestoretic and Micro-K for blood pressure Lipitor for hyperlipidemia  Hypothyroidism on Synthroid  Lab done by Dr. Tang  Health maintenance colonoscopy  Six months refills all medications

## 2022-11-13 RX ORDER — METOPROLOL SUCCINATE 25 MG/1
25 TABLET, EXTENDED RELEASE ORAL DAILY
Qty: 180 TABLET | Refills: 3 | Status: SHIPPED | OUTPATIENT
Start: 2022-11-13 | End: 2024-04-02 | Stop reason: SDUPTHER

## 2022-11-30 ENCOUNTER — TELEPHONE (OUTPATIENT)
Dept: PRIMARY CARE CLINIC | Facility: CLINIC | Age: 72
End: 2022-11-30
Payer: MEDICARE

## 2022-11-30 NOTE — TELEPHONE ENCOUNTER
----- Message from Aga Romo sent at 11/30/2022 10:21 AM CST -----  Contact: pt [phone]  Type: Test Results    What test was performed? US    Who ordered the test? You    When and where were the test performed? 11/22/2022 [facility]    Would you like response via [employer]: no    Comments: Request a call today    Please call and advise.    Thank You

## 2022-12-12 NOTE — TELEPHONE ENCOUNTER
No new care gaps identified.  [facility] Embedded Care Gaps. Reference number: [id number]. 12/12/2022   7:52:33 AM CST

## 2022-12-12 NOTE — TELEPHONE ENCOUNTER
----- Message from Cayla Stringer sent at 12/12/2022  7:31 AM CST -----  Contact: KAIT MABRY [[id number]]@ [phone]   Requesting an RX refill or new RX.  Is this a refill or new RX: Refill  RX name and strength (copy/paste from chart):  potassium chloride (MICRO-K) 10 MEQ CpSR  Is this a 30 day or 90 day RX: 90  Pharmacy name and phone # (copy/paste from chart): [facility]  Phone: [phone] Fax: [phone]  The doctors have asked that we provide their patients with the following 2 reminders -- prescription refills can take up to 72 hours, and a friendly reminder that in the future you can use your [facility] account to request refills:

## 2022-12-14 RX ORDER — POTASSIUM CHLORIDE 750 MG/1
CAPSULE, EXTENDED RELEASE ORAL
Qty: 90 CAPSULE | Refills: 3 | Status: SHIPPED | OUTPATIENT
Start: 2022-12-14

## 2023-01-23 ENCOUNTER — TELEPHONE (OUTPATIENT)
Dept: GASTROENTEROLOGY | Facility: CLINIC | Age: 73
End: 2023-01-23
Payer: MEDICARE

## 2023-01-23 NOTE — TELEPHONE ENCOUNTER
Patient is not sure he needs another colonoscopy right now. He wants to talk to his PCP about this in 5/2023.

## 2023-03-23 ENCOUNTER — TELEPHONE (OUTPATIENT)
Dept: PRIMARY CARE CLINIC | Facility: CLINIC | Age: 73
End: 2023-03-23
Payer: MEDICARE

## 2023-03-23 NOTE — TELEPHONE ENCOUNTER
----- Message from Dena Espinosa sent at 3/23/2023  1:54 PM CDT -----  Contact: Pt Mobile [phone]  Patient would like to come in to have himself weight please.     Patient is asking to speak with a Ana,I'm not jj who Ana is?

## 2023-03-24 NOTE — TELEPHONE ENCOUNTER
----- Message from Slime Hickey MA sent at 3/23/2023  4:59 PM CDT -----  Contact: [employer] Mobile [phone]    ----- Message -----  From: Dena Espinosa  Sent: 3/23/2023   1:55 PM CDT  To: Suzette Naqvi Staff    Patient would like to come in to have himself weight please.     Patient is asking to speak with a Ana,I'm not jj who Ana is?

## 2023-03-24 NOTE — TELEPHONE ENCOUNTER
Called patient to informed him that we are unable to have him come in and weigh himself. Patient was being rude. I ended call with patient.

## 2023-04-26 ENCOUNTER — TELEPHONE (OUTPATIENT)
Dept: PRIMARY CARE CLINIC | Facility: CLINIC | Age: 73
End: 2023-04-26
Payer: MEDICARE

## 2023-04-27 DIAGNOSIS — E78.00 PURE HYPERCHOLESTEROLEMIA: Primary | ICD-10-CM

## 2023-05-02 ENCOUNTER — OFFICE VISIT (OUTPATIENT)
Dept: PRIMARY CARE CLINIC | Facility: CLINIC | Age: 73
End: 2023-05-02
Payer: MEDICARE

## 2023-05-02 VITALS
DIASTOLIC BLOOD PRESSURE: 60 MMHG | HEART RATE: 84 BPM | BODY MASS INDEX: 39.17 KG/M2 | WEIGHT: 315 LBS | RESPIRATION RATE: 16 BRPM | OXYGEN SATURATION: 95 % | SYSTOLIC BLOOD PRESSURE: 110 MMHG | HEIGHT: 75 IN

## 2023-05-02 DIAGNOSIS — Z12.5 ENCOUNTER FOR SCREENING FOR MALIGNANT NEOPLASM OF PROSTATE: ICD-10-CM

## 2023-05-02 DIAGNOSIS — M15.8 OTHER OSTEOARTHRITIS INVOLVING MULTIPLE JOINTS: ICD-10-CM

## 2023-05-02 DIAGNOSIS — E78.00 PURE HYPERCHOLESTEROLEMIA: ICD-10-CM

## 2023-05-02 DIAGNOSIS — I48.11 LONGSTANDING PERSISTENT ATRIAL FIBRILLATION: ICD-10-CM

## 2023-05-02 DIAGNOSIS — Z86.79 HISTORY OF ATRIAL FIBRILLATION: ICD-10-CM

## 2023-05-02 DIAGNOSIS — I10 HYPERTENSION, UNSPECIFIED TYPE: Primary | ICD-10-CM

## 2023-05-02 DIAGNOSIS — E66.01 SEVERE OBESITY (BMI 35.0-39.9) WITH COMORBIDITY: ICD-10-CM

## 2023-05-02 DIAGNOSIS — M54.16 LUMBAR RADICULOPATHY: ICD-10-CM

## 2023-05-02 DIAGNOSIS — R41.3 MEMORY LOSS: ICD-10-CM

## 2023-05-02 PROBLEM — D69.6 THROMBOCYTOPENIA: Status: RESOLVED | Noted: 2017-10-19 | Resolved: 2023-05-02

## 2023-05-02 PROCEDURE — 3074F PR MOST RECENT SYSTOLIC BLOOD PRESSURE < 130 MM HG: ICD-10-PCS | Mod: CPTII,S$GLB,, | Performed by: FAMILY MEDICINE

## 2023-05-02 PROCEDURE — 99999 PR PBB SHADOW E&M-EST. PATIENT-LVL III: ICD-10-PCS | Mod: PBBFAC,,, | Performed by: FAMILY MEDICINE

## 2023-05-02 PROCEDURE — 1101F PT FALLS ASSESS-DOCD LE1/YR: CPT | Mod: CPTII,S$GLB,, | Performed by: FAMILY MEDICINE

## 2023-05-02 PROCEDURE — 99999 PR PBB SHADOW E&M-EST. PATIENT-LVL III: CPT | Mod: PBBFAC,,, | Performed by: FAMILY MEDICINE

## 2023-05-02 PROCEDURE — 1159F PR MEDICATION LIST DOCUMENTED IN MEDICAL RECORD: ICD-10-PCS | Mod: CPTII,S$GLB,, | Performed by: FAMILY MEDICINE

## 2023-05-02 PROCEDURE — 3078F DIAST BP <80 MM HG: CPT | Mod: CPTII,S$GLB,, | Performed by: FAMILY MEDICINE

## 2023-05-02 PROCEDURE — 3288F FALL RISK ASSESSMENT DOCD: CPT | Mod: CPTII,S$GLB,, | Performed by: FAMILY MEDICINE

## 2023-05-02 PROCEDURE — 99214 OFFICE O/P EST MOD 30 MIN: CPT | Mod: S$GLB,,, | Performed by: FAMILY MEDICINE

## 2023-05-02 PROCEDURE — 3074F SYST BP LT 130 MM HG: CPT | Mod: CPTII,S$GLB,, | Performed by: FAMILY MEDICINE

## 2023-05-02 PROCEDURE — 3288F PR FALLS RISK ASSESSMENT DOCUMENTED: ICD-10-PCS | Mod: CPTII,S$GLB,, | Performed by: FAMILY MEDICINE

## 2023-05-02 PROCEDURE — 3078F PR MOST RECENT DIASTOLIC BLOOD PRESSURE < 80 MM HG: ICD-10-PCS | Mod: CPTII,S$GLB,, | Performed by: FAMILY MEDICINE

## 2023-05-02 PROCEDURE — 3008F PR BODY MASS INDEX (BMI) DOCUMENTED: ICD-10-PCS | Mod: CPTII,S$GLB,, | Performed by: FAMILY MEDICINE

## 2023-05-02 PROCEDURE — 1126F AMNT PAIN NOTED NONE PRSNT: CPT | Mod: CPTII,S$GLB,, | Performed by: FAMILY MEDICINE

## 2023-05-02 PROCEDURE — 1159F MED LIST DOCD IN RCRD: CPT | Mod: CPTII,S$GLB,, | Performed by: FAMILY MEDICINE

## 2023-05-02 PROCEDURE — 99214 PR OFFICE/OUTPT VISIT, EST, LEVL IV, 30-39 MIN: ICD-10-PCS | Mod: S$GLB,,, | Performed by: FAMILY MEDICINE

## 2023-05-02 PROCEDURE — 1101F PR PT FALLS ASSESS DOC 0-1 FALLS W/OUT INJ PAST YR: ICD-10-PCS | Mod: CPTII,S$GLB,, | Performed by: FAMILY MEDICINE

## 2023-05-02 PROCEDURE — 3008F BODY MASS INDEX DOCD: CPT | Mod: CPTII,S$GLB,, | Performed by: FAMILY MEDICINE

## 2023-05-02 PROCEDURE — 1126F PR PAIN SEVERITY QUANTIFIED, NO PAIN PRESENT: ICD-10-PCS | Mod: CPTII,S$GLB,, | Performed by: FAMILY MEDICINE

## 2023-05-02 RX ORDER — ALPRAZOLAM 0.5 MG/1
1 TABLET ORAL
COMMUNITY
Start: 2023-03-29 | End: 2024-02-08

## 2023-05-02 RX ORDER — TRAMADOL HYDROCHLORIDE 50 MG/1
50 TABLET ORAL EVERY 6 HOURS PRN
Qty: 30 EACH | Refills: 1 | Status: SHIPPED | OUTPATIENT
Start: 2023-05-02 | End: 2023-12-05 | Stop reason: SDUPTHER

## 2023-05-02 NOTE — PROGRESS NOTES
HPI:  72-year-old male----eating well--+ BM-ambulating well--needs medication refills   Patient sees Dr. Tang in 2 weeks  Hypertension on metoprolol  Hyperlipidemia on Lipitor  Peripheral edema on Lasix  History atrial fib on Pradaxa  History of sciatica has Celebrex needs Ultram refill  History of varicose vein surgery right leg 6 weeks ago left leg 3 weeks ago--saw vascular surgeon about 2-3 weeks ago told that the erythema and the site will go        ROS:    Skin: no psoriasis, eczema, skin cancer  HEENT: no HA  , no ocular pain, blurred vision, diplopia, epistaxis, hoarseness change in voice, + hypothyroidism  Lung: No pneumonia, asthma, Tb, wheezing, SOB, no smoking  Heart: No chest pain, ankle edema, palpitations, MI, barby murmur,+ hypertension,+  Hyperlipidemia--no stent bypass arrhythmia + atrial fibrillation   Abdomen: No nausea, vomiting, diarrhea, constipation, ulcers, hepatitis, gallbladder disease, melena, hematochezia, hematemesis  : no renal disease, stones --hx UTI last visit   MS: no fractures, +O/A--both knees- little better  no lupus, rheumatoid, gout right shoulder pain comes and goes some days worse than others using Aspercreme  Neuro: no dizziness--, no LOC, seizures   No diabetes, no anemia, no anxiety, no n depression prefers no medication tries to get over things on her own  , 2 children, lives with  --not working times 41 years  Objective:   Physical Exam:    General: Well nourished, well developed, no acute distress + overweight  Skin: lesion right lower leg--good eschar--no erythema swelling or discharge  HEENT: Eyes PERRLA, EOM intact, nose clear  throat nonerythematous ears TMs clear  NECK: Supple, no bruits, No JVD, no nodes  Lungs: Clear, no rales, rhonchi, wheezing   Heart: Regular rate and rhythm, no murmurs, gallops, or rubs  Abdomen: flat, bowel sounds positive, no tenderness, or organomegaly  MS:  Pain in knees bilaterally--difficulty getting up from a chair  due to pain in knees--flexion extension popping sensation right knee crepitus in the knees bilaterally pain with squatting can only squat [facility] down able ambulate but some disco  Neuro: Alert, CN intact, oriented X 3 Romberg negative heel-toe intact  Extremities: No cyanosis, clubbing, or edema         Assessment:       1. Hyperlipidemia, unspecified hyperlipidemia type    2. Essential hypertension        Plan:       Hyperlipidemia, unspecified hyperlipidemia type    Essential hypertension  -     lisinopriL (PRINIVIL,ZESTRIL) 20 MG tablet; Take 1 tablet (20 mg total) by mouth once daily.  Dispense: 30 tablet; Refill: 0  History of atrial fibrillation  Chronic back pain with new exacerbation  Lumbar radiculopathy  Morbid obesity  Osteoarthritis  Obstructive sleep apnea      Main Reason here-  Six-month checkup  Hyper lipidemia--patient with cholesterol of 88 HDL 27 wants to be off atorvastatin for 6 months recheck labs see if able to get off atorvastatin  Hypertension metoprolol8  Peripheral edema on Lasix  History lumbosacral strain--had nerve block that lasted 2 years--going to [place] needs some Ultram for trip to see neurosurgeon for possible epidural steroid injection again   Other medical issues   bilateral osteoarthritis knees --x-ray both knees--see DR Verdugo --?  Inject both knees--discuss knee replacement  Hypertension/hyperlipidemia-- Low-sodium low-fat diet/exercise/try maintain  ideal body weight--Norvasc and Zestoretic and Micro-K for blood pressure Lipitor for hyperlipidemia  Hypothyroidism on Synthroid  Lab done by Dr. Tang  Health maintenance colonoscopy  Six months refills all medications

## 2023-05-03 ENCOUNTER — TELEPHONE (OUTPATIENT)
Dept: SURGERY | Facility: CLINIC | Age: 73
End: 2023-05-03
Payer: MEDICARE

## 2023-05-09 ENCOUNTER — TELEPHONE (OUTPATIENT)
Dept: SURGERY | Facility: CLINIC | Age: 73
End: 2023-05-09
Payer: MEDICARE

## 2023-05-09 RX ORDER — SODIUM PICOSULFATE, MAGNESIUM OXIDE, AND ANHYDROUS CITRIC ACID 10; 3.5; 12 MG/160ML; G/160ML; G/160ML
LIQUID ORAL
Status: CANCELLED | OUTPATIENT
Start: 2023-05-09

## 2023-05-09 NOTE — TELEPHONE ENCOUNTER
Called patient in reference to a referral to Colorectal Surgery for colon cancer screening. Patient verbally consented to a Colonoscopy and requested to be scheduled for a Colonoscopy on 06/12/223 Patient was advised a designated  is required on the day of the Colonoscopy to drive the patient home and the  must be at least. 18 years old.Colonoscopy Prep instructions were thoroughly explained and discussed with the patient.It was emphasized, and reiterated to the patient, to please not to follow the bowel prep instructions that comes with the bowel prep package.However, to please follow the prep instructions that will be received in the mail,or via the [employer] portal, or by both modes of delivery, which ever method of delivery the patient prefers,from the [facility] L[facility]   Patient acknowledges understanding Prep instructions as explained and discussed on the phone.. Patient was advised the Colonoscopy Prep instructions discussed and explained on the phone,are being mailed out to the patient's verified address on file,or put onto the [employer] portal,or both methods of delivery, whichever the patient prefers. Patient's address on file was verified with the patient for accuracy of mailing. Patient's medications on file was reviewed with the patient for accuracy of information. Patient denies taking  any other medications other than those listed and verified on medication profile.Patient was explained the Colonoscopy will be performed here at [facility]. Patient was further explained the Pre-Op will call one day prior to the procedure date, to discuss Pre-Op instructions;and what time to report for the Colonoscopy. The patient was given the opportunity to ask any questions about the Colonoscopy. No further issues were discussed.

## 2023-05-10 RX ORDER — SODIUM PICOSULFATE, MAGNESIUM OXIDE, AND ANHYDROUS CITRIC ACID 10; 3.5; 12 MG/160ML; G/160ML; G/160ML
LIQUID ORAL
Qty: 320 ML | Refills: 0 | Status: ON HOLD | OUTPATIENT
Start: 2023-05-10 | End: 2023-06-12 | Stop reason: HOSPADM

## 2023-06-02 ENCOUNTER — DOCUMENTATION ONLY (OUTPATIENT)
Dept: SURGERY | Facility: CLINIC | Age: 73
End: 2023-06-02
Payer: MEDICARE

## 2023-06-02 NOTE — PROGRESS NOTES
Good afternoon Dr Driss Bradford MD  Bharat Bradford Staff, - the attached patient is scheduled to have a Colonoscopy on 07/12/2023. The patient is noted to have Pradaxa 150 MG PO TWICE daily in the patient's medication profile. Please ascribe as to how this patient is to take the afore listed medication Pre,and Post Colonoscopy procedure.   ThanksCameron

## 2023-06-05 ENCOUNTER — TELEPHONE (OUTPATIENT)
Dept: GASTROENTEROLOGY | Facility: CLINIC | Age: 73
End: 2023-06-05
Payer: MEDICARE

## 2023-06-05 NOTE — TELEPHONE ENCOUNTER
Spoke with pt and informed that Dr. Tang said ok to hold pradaxa 3 days prior procedure. It will be scanned on . Verbal understanding

## 2023-06-06 ENCOUNTER — TELEPHONE (OUTPATIENT)
Dept: PRIMARY CARE CLINIC | Facility: CLINIC | Age: 73
End: 2023-06-06
Payer: MEDICARE

## 2023-06-06 NOTE — TELEPHONE ENCOUNTER
----- Message from Cameron Gaston LPN sent at 6/2/2023  1:27 PM CDT -----  Regarding: Colonoscopy/Pradaxa  Good afternoon MD barak Laughlin Brian Staff, - the attached patient is scheduled to have a Colonoscopy on 07/12/2023. The patient is noted to have Pradaxa 150 MG PO TWICE daily in the patient's medication profile. Please ascribe as to how this patient is to take the afore listed medication Pre,and Post Colonoscopy procedure.   Thanks,  Cameron

## 2023-06-14 ENCOUNTER — PATIENT MESSAGE (OUTPATIENT)
Dept: GASTROENTEROLOGY | Facility: CLINIC | Age: 73
End: 2023-06-14
Payer: MEDICARE

## 2023-06-15 ENCOUNTER — TELEPHONE (OUTPATIENT)
Dept: GASTROENTEROLOGY | Facility: CLINIC | Age: 73
End: 2023-06-15
Payer: MEDICARE

## 2023-06-21 ENCOUNTER — TELEPHONE (OUTPATIENT)
Dept: GASTROENTEROLOGY | Facility: CLINIC | Age: 73
End: 2023-06-21
Payer: MEDICARE

## 2023-07-12 RX ORDER — DABIGATRAN ETEXILATE MESYLATE 150 MG/1
150 CAPSULE ORAL 2 TIMES DAILY
Qty: 180 CAPSULE | Refills: 1 | Status: SHIPPED | OUTPATIENT
Start: 2023-07-12 | End: 2024-01-19 | Stop reason: SDUPTHER

## 2023-07-17 ENCOUNTER — TELEPHONE (OUTPATIENT)
Dept: PRIMARY CARE CLINIC | Facility: CLINIC | Age: 73
End: 2023-07-17
Payer: MEDICARE

## 2023-07-17 NOTE — TELEPHONE ENCOUNTER
----- Message from Alvaro Griggs sent at 7/17/2023  9:32 AM CDT -----  Contact: [phone]  1MEDICALADVICE     Patient is calling for Medical Advice regarding: pain in left side kidney close to back and nausea     How long has patient had these symptoms: about 3-4 days     [facility] and phone#:   [facility] [address]  [facility] [address]  [facility] [address]  Phone: [phone] Fax: [phone]       Would like response via [place]:  no     Comments:

## 2023-07-19 NOTE — TELEPHONE ENCOUNTER
Called patient in regards to message. Patient said that his still experiencing the pain. Patient was informed to go to the ED. Patient verbalized understand.

## 2023-09-18 ENCOUNTER — PATIENT MESSAGE (OUTPATIENT)
Dept: PRIMARY CARE CLINIC | Facility: CLINIC | Age: 73
End: 2023-09-18
Payer: MEDICARE

## 2023-10-18 ENCOUNTER — PATIENT MESSAGE (OUTPATIENT)
Dept: CARDIOLOGY | Facility: CLINIC | Age: 73
End: 2023-10-18
Payer: MEDICARE

## 2023-10-24 RX ORDER — CELECOXIB 100 MG/1
100 CAPSULE ORAL DAILY
Qty: 90 CAPSULE | Refills: 3 | Status: SHIPPED | OUTPATIENT
Start: 2023-10-24

## 2023-10-24 NOTE — TELEPHONE ENCOUNTER
No care due was identified.  [facility] Embedded Care Due Messages. Reference number: [id number].   10/24/2023 11:39:24 AM CDT

## 2023-10-24 NOTE — TELEPHONE ENCOUNTER
----- Message from Zully Wild sent at 10/24/2023 10:02 AM CDT -----  Contact: Pt [phone]  Requesting an RX refill or new RX.  Is this a refill or new RX: Refill  RX name and strength celecoxib (CELEBREX) 100 MG capsule  Is this a 30 day or 90 day RX:   Pharmacy:  [facility] - [place] [address] [address] Phone:  [phone] Fax:  [phone]

## 2023-12-05 ENCOUNTER — OFFICE VISIT (OUTPATIENT)
Dept: PRIMARY CARE CLINIC | Facility: CLINIC | Age: 73
End: 2023-12-05
Payer: MEDICARE

## 2023-12-05 VITALS
WEIGHT: 307.56 LBS | OXYGEN SATURATION: 95 % | HEART RATE: 79 BPM | BODY MASS INDEX: 38.24 KG/M2 | DIASTOLIC BLOOD PRESSURE: 82 MMHG | RESPIRATION RATE: 18 BRPM | SYSTOLIC BLOOD PRESSURE: 128 MMHG | HEIGHT: 75 IN

## 2023-12-05 DIAGNOSIS — M54.16 LUMBAR RADICULOPATHY: ICD-10-CM

## 2023-12-05 DIAGNOSIS — E78.00 PURE HYPERCHOLESTEROLEMIA: ICD-10-CM

## 2023-12-05 DIAGNOSIS — Z86.79 HISTORY OF ATRIAL FIBRILLATION: ICD-10-CM

## 2023-12-05 DIAGNOSIS — M25.562 CHRONIC PAIN OF LEFT KNEE: ICD-10-CM

## 2023-12-05 DIAGNOSIS — E66.01 SEVERE OBESITY (BMI 35.0-39.9) WITH COMORBIDITY: ICD-10-CM

## 2023-12-05 DIAGNOSIS — I10 HYPERTENSION, UNSPECIFIED TYPE: Primary | ICD-10-CM

## 2023-12-05 DIAGNOSIS — G47.33 OSA (OBSTRUCTIVE SLEEP APNEA): ICD-10-CM

## 2023-12-05 DIAGNOSIS — G89.29 CHRONIC PAIN OF LEFT KNEE: ICD-10-CM

## 2023-12-05 DIAGNOSIS — Z23 FLU VACCINE NEED: ICD-10-CM

## 2023-12-05 PROBLEM — I48.11 LONGSTANDING PERSISTENT ATRIAL FIBRILLATION: Status: RESOLVED | Noted: 2023-05-02 | Resolved: 2023-12-05

## 2023-12-05 PROBLEM — E78.5 HYPERLIPIDEMIA: Status: RESOLVED | Noted: 2017-10-19 | Resolved: 2023-12-05

## 2023-12-05 PROCEDURE — 3079F PR MOST RECENT DIASTOLIC BLOOD PRESSURE 80-89 MM HG: ICD-10-PCS | Mod: CPTII,S$GLB,, | Performed by: FAMILY MEDICINE

## 2023-12-05 PROCEDURE — 99214 OFFICE O/P EST MOD 30 MIN: CPT | Mod: S$GLB,,, | Performed by: FAMILY MEDICINE

## 2023-12-05 PROCEDURE — 1101F PT FALLS ASSESS-DOCD LE1/YR: CPT | Mod: CPTII,S$GLB,, | Performed by: FAMILY MEDICINE

## 2023-12-05 PROCEDURE — G0008 ADMIN INFLUENZA VIRUS VAC: HCPCS | Mod: S$GLB,,, | Performed by: FAMILY MEDICINE

## 2023-12-05 PROCEDURE — 1126F PR PAIN SEVERITY QUANTIFIED, NO PAIN PRESENT: ICD-10-PCS | Mod: CPTII,S$GLB,, | Performed by: FAMILY MEDICINE

## 2023-12-05 PROCEDURE — 1159F PR MEDICATION LIST DOCUMENTED IN MEDICAL RECORD: ICD-10-PCS | Mod: CPTII,S$GLB,, | Performed by: FAMILY MEDICINE

## 2023-12-05 PROCEDURE — G0008 FLU VACCINE - QUADRIVALENT - ADJUVANTED: ICD-10-PCS | Mod: S$GLB,,, | Performed by: FAMILY MEDICINE

## 2023-12-05 PROCEDURE — 3288F PR FALLS RISK ASSESSMENT DOCUMENTED: ICD-10-PCS | Mod: CPTII,S$GLB,, | Performed by: FAMILY MEDICINE

## 2023-12-05 PROCEDURE — 1101F PR PT FALLS ASSESS DOC 0-1 FALLS W/OUT INJ PAST YR: ICD-10-PCS | Mod: CPTII,S$GLB,, | Performed by: FAMILY MEDICINE

## 2023-12-05 PROCEDURE — 90694 FLU VACCINE - QUADRIVALENT - ADJUVANTED: ICD-10-PCS | Mod: S$GLB,,, | Performed by: FAMILY MEDICINE

## 2023-12-05 PROCEDURE — 99214 PR OFFICE/OUTPT VISIT, EST, LEVL IV, 30-39 MIN: ICD-10-PCS | Mod: S$GLB,,, | Performed by: FAMILY MEDICINE

## 2023-12-05 PROCEDURE — 3008F BODY MASS INDEX DOCD: CPT | Mod: CPTII,S$GLB,, | Performed by: FAMILY MEDICINE

## 2023-12-05 PROCEDURE — 3288F FALL RISK ASSESSMENT DOCD: CPT | Mod: CPTII,S$GLB,, | Performed by: FAMILY MEDICINE

## 2023-12-05 PROCEDURE — 3008F PR BODY MASS INDEX (BMI) DOCUMENTED: ICD-10-PCS | Mod: CPTII,S$GLB,, | Performed by: FAMILY MEDICINE

## 2023-12-05 PROCEDURE — 1159F MED LIST DOCD IN RCRD: CPT | Mod: CPTII,S$GLB,, | Performed by: FAMILY MEDICINE

## 2023-12-05 PROCEDURE — 3074F PR MOST RECENT SYSTOLIC BLOOD PRESSURE < 130 MM HG: ICD-10-PCS | Mod: CPTII,S$GLB,, | Performed by: FAMILY MEDICINE

## 2023-12-05 PROCEDURE — 1126F AMNT PAIN NOTED NONE PRSNT: CPT | Mod: CPTII,S$GLB,, | Performed by: FAMILY MEDICINE

## 2023-12-05 PROCEDURE — 99999 PR PBB SHADOW E&M-EST. PATIENT-LVL III: ICD-10-PCS | Mod: PBBFAC,,, | Performed by: FAMILY MEDICINE

## 2023-12-05 PROCEDURE — 90694 VACC AIIV4 NO PRSRV 0.5ML IM: CPT | Mod: S$GLB,,, | Performed by: FAMILY MEDICINE

## 2023-12-05 PROCEDURE — 99999 PR PBB SHADOW E&M-EST. PATIENT-LVL III: CPT | Mod: PBBFAC,,, | Performed by: FAMILY MEDICINE

## 2023-12-05 PROCEDURE — 3079F DIAST BP 80-89 MM HG: CPT | Mod: CPTII,S$GLB,, | Performed by: FAMILY MEDICINE

## 2023-12-05 PROCEDURE — 3074F SYST BP LT 130 MM HG: CPT | Mod: CPTII,S$GLB,, | Performed by: FAMILY MEDICINE

## 2023-12-05 RX ORDER — TRAMADOL HYDROCHLORIDE 50 MG/1
50 TABLET ORAL EVERY 6 HOURS PRN
Qty: 30 EACH | Refills: 1 | Status: SHIPPED | OUTPATIENT
Start: 2023-12-05

## 2023-12-05 NOTE — PROGRESS NOTES
HPI: 72 yo WM in for 6 mo checkup-- eating well-- + BM-- ambulating well   Sees Dr. Tang next week cardiology   History of hypertension blood pressure 128/82 patient on metoprolol   History of hyperlipidemia-- taken off of amlodipine   Peripheral edema-- doing well no need to take Lasix   History of atrial fib on Pradaxa   History of sciatica on Celebrex and Ultram-- in osteoarthritis in both knees   History varicose vein surgery  both legs doing well   Patient requesting handicap parking---- difficulty walking more than 600 ft without having to rest   Lab CBCs CMP lipids PSA TSH all basically normal      ROS:    Skin: no psoriasis, eczema, skin cancer  HEENT: no HA  , no ocular pain, blurred vision, diplopia, epistaxis, hoarseness change in voice, + hypothyroidism  Lung: No pneumonia, asthma, Tb, wheezing, SOB, no smoking  Heart: No chest pain, ankle edema, palpitations, MI, barby murmur,+ hypertension,+  Hyperlipidemia--no stent bypass arrhythmia + atrial fibrillation   Abdomen: No nausea, vomiting, diarrhea, constipation, ulcers, hepatitis, gallbladder disease, melena, hematochezia, hematemesis  : no renal disease, stones --hx UTI last visit   MS: no fractures, +O/A--both knees- little better  no lupus, rheumatoid, gout right shoulder pain comes and goes some days worse than others using Aspercreme  Neuro: no dizziness--, no LOC, seizures   No diabetes, no anemia, no anxiety, no n depression prefers no medication tries to get over things on her own  , 2 children, lives with  --not working times 41 years  Objective:   Physical Exam:    General: Well nourished, well developed, no acute distress + overweight  Skin: lesion right lower leg--good eschar--no erythema swelling or discharge  HEENT: Eyes PERRLA, EOM intact, nose clear  throat nonerythematous ears TMs clear  NECK: Supple, no bruits, No JVD, no nodes  Lungs: Clear, no rales, rhonchi, wheezing   Heart: Regular rate and rhythm, no murmurs,  gallops, or rubs  Abdomen: flat, bowel sounds positive, no tenderness, or organomegaly  MS:  Pain in knees bilaterally--difficulty getting up from a chair due to pain in knees--flexion extension popping sensation right knee crepitus in the knees bilaterally pain with squatting can only squat senior care down able ambulate but some disco  Neuro: Alert, CN intact, oriented X 3 Romberg negative heel-toe intact  Extremities: No cyanosis, clubbing, or edema         Assessment:       1. Hyperlipidemia, unspecified hyperlipidemia type    2. Essential hypertension        Plan:       Hyperlipidemia, unspecified hyperlipidemia type    Essential hypertension  -     lisinopriL (PRINIVIL,ZESTRIL) 20 MG tablet; Take 1 tablet (20 mg total) by mouth once daily.  Dispense: 30 tablet; Refill: 0  History of atrial fibrillation  Chronic back pain with new exacerbation  Lumbar radiculopathy  Morbid obesity  Osteoarthritis  Obstructive sleep apnea      Main Reason here-  Six-month checkup  Hyper lipidemia-- lipids within normal limits patient  off of atorvastatin  Hypertension metoprolol  128/82  Peripheral edema on Lasix-- has not had to take recently  History lumbosacral strain--had nerve block that lasted 2 years--going to [place] needs some Ultram for trip to see neurosurgeon for possible epidural steroid injection again   Osteoarthritis both knees patient Needs handicap license only able walk sees Dr. Ray who injects patient's knees did discuss a knee replacement   Other medical issues   bilateral osteoarthritis knees --x-ray both knees--see DR Verdugo --?  Inject both knees--discuss knee replacement  Lab done by Dr. Tang  Health maintenance colonoscopy  Six months refills all medications

## 2023-12-05 NOTE — PROGRESS NOTES
Subjective:       Patient ID: Shon Asencio is a 73 y.o. male.    Chief Complaint: 6 month check up     HPI  Review of Systems    Objective:      Physical Exam    Assessment:       1. Lumbar radiculopathy    2. Flu vaccine need        Plan:       Lumbar radiculopathy    Flu vaccine need  -     Influenza (FLUAD) - Quadrivalent (Adjuvanted) *Preferred* (65+) (PF)

## 2023-12-05 NOTE — PROGRESS NOTES
Verified pt ID using name and . NKDA. Administered HD Flu Vaccine in RD per physician order using aseptic technique. Aspirated and no blood return noted. Pt tolerated well with no adverse reactions noted.

## 2023-12-13 ENCOUNTER — TELEPHONE (OUTPATIENT)
Dept: DERMATOLOGY | Facility: CLINIC | Age: 73
End: 2023-12-13
Payer: MEDICARE

## 2024-01-03 ENCOUNTER — OFFICE VISIT (OUTPATIENT)
Dept: DERMATOLOGY | Facility: CLINIC | Age: 74
End: 2024-01-03
Payer: MEDICARE

## 2024-01-03 DIAGNOSIS — L57.0 AK (ACTINIC KERATOSIS): ICD-10-CM

## 2024-01-03 DIAGNOSIS — D48.5 NEOPLASM OF UNCERTAIN BEHAVIOR OF SKIN: Primary | ICD-10-CM

## 2024-01-03 DIAGNOSIS — Z85.828 HISTORY OF NONMELANOMA SKIN CANCER: ICD-10-CM

## 2024-01-03 PROCEDURE — 99203 OFFICE O/P NEW LOW 30 MIN: CPT | Mod: 25,S$GLB,, | Performed by: STUDENT IN AN ORGANIZED HEALTH CARE EDUCATION/TRAINING PROGRAM

## 2024-01-03 PROCEDURE — 1126F AMNT PAIN NOTED NONE PRSNT: CPT | Mod: CPTII,S$GLB,, | Performed by: STUDENT IN AN ORGANIZED HEALTH CARE EDUCATION/TRAINING PROGRAM

## 2024-01-03 PROCEDURE — 1159F MED LIST DOCD IN RCRD: CPT | Mod: CPTII,S$GLB,, | Performed by: STUDENT IN AN ORGANIZED HEALTH CARE EDUCATION/TRAINING PROGRAM

## 2024-01-03 PROCEDURE — 88305 TISSUE EXAM BY PATHOLOGIST: CPT | Mod: 26,,, | Performed by: PATHOLOGY

## 2024-01-03 PROCEDURE — 11103 TANGNTL BX SKIN EA SEP/ADDL: CPT | Mod: S$GLB,,, | Performed by: STUDENT IN AN ORGANIZED HEALTH CARE EDUCATION/TRAINING PROGRAM

## 2024-01-03 PROCEDURE — 88305 TISSUE EXAM BY PATHOLOGIST: CPT | Performed by: PATHOLOGY

## 2024-01-03 PROCEDURE — 99999 PR PBB SHADOW E&M-EST. PATIENT-LVL III: CPT | Mod: PBBFAC,,, | Performed by: STUDENT IN AN ORGANIZED HEALTH CARE EDUCATION/TRAINING PROGRAM

## 2024-01-03 PROCEDURE — 11102 TANGNTL BX SKIN SINGLE LES: CPT | Mod: S$GLB,,, | Performed by: STUDENT IN AN ORGANIZED HEALTH CARE EDUCATION/TRAINING PROGRAM

## 2024-01-03 PROCEDURE — 17000 DESTRUCT PREMALG LESION: CPT | Mod: XS,S$GLB,, | Performed by: STUDENT IN AN ORGANIZED HEALTH CARE EDUCATION/TRAINING PROGRAM

## 2024-01-03 PROCEDURE — 1160F RVW MEDS BY RX/DR IN RCRD: CPT | Mod: CPTII,S$GLB,, | Performed by: STUDENT IN AN ORGANIZED HEALTH CARE EDUCATION/TRAINING PROGRAM

## 2024-01-03 PROCEDURE — 1101F PT FALLS ASSESS-DOCD LE1/YR: CPT | Mod: CPTII,S$GLB,, | Performed by: STUDENT IN AN ORGANIZED HEALTH CARE EDUCATION/TRAINING PROGRAM

## 2024-01-03 PROCEDURE — 17003 DESTRUCT PREMALG LES 2-14: CPT | Mod: S$GLB,,, | Performed by: STUDENT IN AN ORGANIZED HEALTH CARE EDUCATION/TRAINING PROGRAM

## 2024-01-03 PROCEDURE — 3288F FALL RISK ASSESSMENT DOCD: CPT | Mod: CPTII,S$GLB,, | Performed by: STUDENT IN AN ORGANIZED HEALTH CARE EDUCATION/TRAINING PROGRAM

## 2024-01-03 RX ORDER — CHLORHEXIDINE GLUCONATE ORAL RINSE 1.2 MG/ML
SOLUTION DENTAL
COMMUNITY
Start: 2023-12-07

## 2024-01-03 NOTE — PROGRESS NOTES
Subjective:      Patient ID:  Shon Asencio is a 73 y.o. male who presents for   Chief Complaint   Patient presents with    Lesion     Left arm, Right temple    Dry Skin     Face     Shon Asencio is a 73 y.o. male who presents for: evaluation of skin lesions.    New patient- transferring care to [facility] was seen by Dr. Leslie (sp?)    The patient has the following lesions of concern:  Location: spot on Left arm  Duration: 1 yr  Symptoms: tender, growing  Relieving factors/Previous treatments: OTC wart treatment (did not help)    Patient with new complaint of lesion(s)  Location: spot on right temple  Duration: 1 yr  Symptoms: none  Relieving factors/Previous treatments: OTC wart treatment (did not help)    Patient with new complaint of lesion(s)  Location: dryness of face (forehead and cheeks)  Duration: 20 yrs  Symptoms: very dry  Relieving factors/Previous treatments: CeraVe moisturizer (not helping much)        Hx NMSC on left nose s/p Mohs with Dr. Rosenberg 2012   He thinks he has also had efudex and PDT before as well      Review of Systems    Objective:   Physical Exam   Skin:                    Diagram Legend     Erythematous scaling macule/papule c/w actinic keratosis       Vascular papule c/w angioma      Pigmented verrucoid papule/plaque c/w seborrheic keratosis      Yellow umbilicated papule c/w sebaceous hyperplasia      Irregularly shaped tan macule c/w lentigo     1-2 mm smooth white papules consistent with Milia      Movable subcutaneous cyst with punctum c/w epidermal inclusion cyst      Subcutaneous movable cyst c/w pilar cyst      Firm pink to brown papule c/w dermatofibroma      Pedunculated fleshy papule(s) c/w skin tag(s)      Evenly pigmented macule c/w junctional nevus     Mildly variegated pigmented, slightly irregular-bordered macule c/w mildly atypical nevus      Flesh colored to evenly pigmented papule c/w intradermal nevus       Pink pearly papule/plaque c/w basal cell  carcinoma      Erythematous hyperkeratotic cursted plaque c/w SCC      Surgical scar with no sign of skin cancer recurrence      Open and closed comedones      Inflammatory papules and pustules      Verrucoid papule consistent consistent with wart     Erythematous eczematous patches and plaques     Dystrophic onycholytic nail with subungual debris c/w onychomycosis     Umbilicated papule    Erythematous-base heme-crusted tan verrucoid plaque consistent with inflamed seborrheic keratosis     Erythematous Silvery Scaling Plaque c/w Psoriasis     See annotation                Assessment / Plan:      Pathology Orders:       Normal Orders This Visit    Specimen to Pathology, Dermatology     Questions:    Procedure Type: Dermatology and skin neoplasms    Number of Specimens: 2    ------------------------: -------------------------    Spec 1 Procedure: Biopsy    Spec 1 Clinical Impression: erythematous hyperkeratotic nodule- 1 cm- r/o scc    Spec 1 Source: left forearm    ------------------------: -------------------------    Spec 2 Procedure: Biopsy    Spec 2 Clinical Impression: erythematous hyperkeratotic papule- r/o scc    Spec 2 Source: right preauricular cheek    Clinical Information: erythematous hyperkeratotic papule- r/o scc    Release to patient: Immediate          Neoplasm of uncertain behavior of skin  -     Specimen to Pathology, Dermatology  Shave biopsy procedure note:    Shave biopsy performed after verbal consent including risk of infection, scar, recurrence, need for additional treatment of site. Area prepped with alcohol, anesthetized with approximately 1.0cc of 1% lidocaine with epinephrine. Lesional tissue shaved with razor blade. Hemostasis achieved with application of aluminum chloride followed by hyfrecation. No complications. Dressing applied. Wound care explained.    If face + will send to Mohs  If forearm + will excise myself    AK (actinic keratosis)  -     Photodynamic Therapy;  Future  Cryosurgery Procedure Note    Verbal consent from the patient is obtained including, but not limited to, risk of hypopigmentation/hyperpigmentation, scar, recurrence of lesion. The patient is aware of the precancerous quality and need for treatment of these lesions. Liquid nitrogen cryosurgery is applied to the 7 actinic keratoses, as detailed in the physical exam, to produce a freeze injury. The patient is aware that blisters may form and is instructed on wound care with gentle cleansing and use of vaseline ointment to keep moist until healed.    Treatment of AKs and HAKs on arms with cryotherapy  Field cancerization and diffuse actinic keratoses over face (Forehead >>>) and dorsal forerams/hands  Recommend field therapy  PDT to face ordered if covered- if not covered will send efudex + calcipotriene bid x 7 days to forehead/nose through     History of NMSC  Examined areas of prior scars, no evidence of recurrence  - Continue skin exams at least annually, increased risk of additional skin cancers developing in the future  - I discussed the importance of sun protection with the patient. Recommend daily use of SPF 30+ physical or mineral sunscreen, apply 15 minutes before going outdoors and reapply every 2 hours. Discussed wide-brimmed hats and UPF 50+ clothing.    FBSE deferred by pt today    RTC 3 months FBSE and follow up PDT or efudex

## 2024-01-08 LAB
FINAL PATHOLOGIC DIAGNOSIS: NORMAL
GROSS: NORMAL
Lab: NORMAL
MICROSCOPIC EXAM: NORMAL

## 2024-01-19 NOTE — TELEPHONE ENCOUNTER
----- Message from Zully Wild sent at 1/19/2024  9:59 AM CST -----  Contact: Pt [facility] - [place] [address] [address]  Phone: [phone] Fax: [phone]  Requesting an RX refill or new RX.  Is this a refill or new RX: Refill  RX name and strength PRADAXA 150 mg Cap   Is this a 30 day or 90 day RX:   Pharmacy name and phone # [facility] - [place] [address] [address]  Phone: [phone] Fax: [phone]

## 2024-01-20 RX ORDER — DABIGATRAN ETEXILATE MESYLATE 150 MG/1
150 CAPSULE ORAL 2 TIMES DAILY
Qty: 180 CAPSULE | Refills: 1 | Status: SHIPPED | OUTPATIENT
Start: 2024-01-20

## 2024-02-08 ENCOUNTER — OFFICE VISIT (OUTPATIENT)
Dept: UROLOGY | Facility: CLINIC | Age: 74
End: 2024-02-08
Payer: MEDICARE

## 2024-02-08 VITALS
WEIGHT: 309.44 LBS | DIASTOLIC BLOOD PRESSURE: 80 MMHG | SYSTOLIC BLOOD PRESSURE: 127 MMHG | HEART RATE: 91 BPM | HEIGHT: 75 IN | BODY MASS INDEX: 38.47 KG/M2

## 2024-02-08 DIAGNOSIS — Z12.5 PROSTATE CANCER SCREENING: Primary | ICD-10-CM

## 2024-02-08 PROCEDURE — 3079F DIAST BP 80-89 MM HG: CPT | Mod: CPTII,S$GLB,, | Performed by: STUDENT IN AN ORGANIZED HEALTH CARE EDUCATION/TRAINING PROGRAM

## 2024-02-08 PROCEDURE — 3288F FALL RISK ASSESSMENT DOCD: CPT | Mod: CPTII,S$GLB,, | Performed by: STUDENT IN AN ORGANIZED HEALTH CARE EDUCATION/TRAINING PROGRAM

## 2024-02-08 PROCEDURE — 99999 PR PBB SHADOW E&M-EST. PATIENT-LVL III: CPT | Mod: PBBFAC,,, | Performed by: STUDENT IN AN ORGANIZED HEALTH CARE EDUCATION/TRAINING PROGRAM

## 2024-02-08 PROCEDURE — 1126F AMNT PAIN NOTED NONE PRSNT: CPT | Mod: CPTII,S$GLB,, | Performed by: STUDENT IN AN ORGANIZED HEALTH CARE EDUCATION/TRAINING PROGRAM

## 2024-02-08 PROCEDURE — 99202 OFFICE O/P NEW SF 15 MIN: CPT | Mod: S$GLB,,, | Performed by: STUDENT IN AN ORGANIZED HEALTH CARE EDUCATION/TRAINING PROGRAM

## 2024-02-08 PROCEDURE — 1159F MED LIST DOCD IN RCRD: CPT | Mod: CPTII,S$GLB,, | Performed by: STUDENT IN AN ORGANIZED HEALTH CARE EDUCATION/TRAINING PROGRAM

## 2024-02-08 PROCEDURE — 3008F BODY MASS INDEX DOCD: CPT | Mod: CPTII,S$GLB,, | Performed by: STUDENT IN AN ORGANIZED HEALTH CARE EDUCATION/TRAINING PROGRAM

## 2024-02-08 PROCEDURE — 3074F SYST BP LT 130 MM HG: CPT | Mod: CPTII,S$GLB,, | Performed by: STUDENT IN AN ORGANIZED HEALTH CARE EDUCATION/TRAINING PROGRAM

## 2024-02-08 PROCEDURE — 1100F PTFALLS ASSESS-DOCD GE2>/YR: CPT | Mod: CPTII,S$GLB,, | Performed by: STUDENT IN AN ORGANIZED HEALTH CARE EDUCATION/TRAINING PROGRAM

## 2024-02-08 NOTE — PROGRESS NOTES
"[facility] - Urology [place] [address]   Clinic Note    SUBJECTIVE:     Chief Complaint: "prostate exam"    History of Present Illness:  Shon Asencio is a 73 y.o. male who presents to clinic for prostate exam. He is new to our clinic referred by Aaareferral Self.     PSA 10/2023 was 3.4. Has never been elevated. No issues with voiding. No gross hematuria or dysuria.    IPSS Questionnaire (AUA-SS):  Over the past month    1)  Incomplete Emptying - How often have you had a sensation of not emptying your bladder completely after you finish urinating?  0 - Not at all   2)  Frequency - How often have you had to urinate again less than two hours after you finished urinating? 2 - Less than half the time   3)  Intermittency - How often have you found you stopped and started again several times when you urinated?  0 - Not at all   4) Urgency - How difficult have you found it to postpone urination?  3 - About half the time   5) Weak Stream - How often have you had a weak urinary stream?  0 - Not at all   6) Straining  - How often have you had to push or strain to begin urination?  0 - Not at all   7) Nocturia - How many times did you most typically get up to urinate from the time you went to bed until the time you got up in the morning?  2 - 2 times   Total score:  0-7 mild, 8-19 moderate, 20-35 severe 8   Quality of Life:  1 - Pleased            OBJECTIVE:     Estimated body mass index is 38.67 kg/m² as calculated from the following:    Height as of this encounter: 6' 3" (1.905 m).    Weight as of this encounter: 140.4 kg (309 lb 6.7 oz).    Vital Signs (Most Recent)  Pulse: 91 (02/08/24 1317)  BP: 127/80 (02/08/24 1317)    Physical Exam  Constitutional:       Appearance: Normal appearance. He is obese.   HENT:      Head: Normocephalic and atraumatic.   Eyes:      Conjunctiva/sclera: Conjunctivae normal.   Cardiovascular:      Rate and Rhythm: Normal rate.   Genitourinary:     Comments: Prostate 25-30g, nontender, no " nodules  Musculoskeletal:         General: Normal range of motion.   Skin:     General: Skin is warm and dry.   Neurological:      General: No focal deficit present.      Mental Status: He is alert and oriented to person, place, and time.   Psychiatric:         Mood and Affect: Mood normal.         Behavior: Behavior normal.         Thought Content: Thought content normal.         Judgment: Judgment normal.         Lab Results   Component Value Date    BUN 10 10/24/2023    CREATININE 0.9 10/24/2023    WBC 6.86 10/24/2023    HGB 16.0 10/24/2023    HCT 47.4 10/24/2023     10/24/2023    AST 23 10/24/2023    ALT 25 10/24/2023    ALKPHOS 77 10/24/2023    ALBUMIN 3.9 10/24/2023    HGBA1C 5.1 11/02/2022        Lab Results   Component Value Date    PSA 3.4 10/24/2023    PSA 3.1 11/08/2018    PSA 3.2 04/18/2018         ASSESSMENT     1. Prostate cancer screening      PLAN:   1. Prostate cancer screening     Discussed with patient that at his age, prostate cancer screening is no longer recommended. Patient would like some form of prostate cancer screening to continue, so will plan for DENIS every other year. With normal DENIS, likelihood of clinically significant prostate cancer detected due to elevated PSA is low, so recommend cessation of PSA screening.    Peter Jimenez MD     Letter to Self, Aaareferral

## 2024-02-14 NOTE — TELEPHONE ENCOUNTER
----- Message from Cole Stevens MD sent at 6/15/2023 12:33 PM CDT -----  Pathology from recent colonoscopy reviewed.  Colon polyp(s) benign.  Repeat colonoscopy in 3 years.     lr

## 2024-03-13 ENCOUNTER — TELEPHONE (OUTPATIENT)
Dept: DERMATOLOGY | Facility: CLINIC | Age: 74
End: 2024-03-13
Payer: MEDICARE

## 2024-03-13 NOTE — TELEPHONE ENCOUNTER
After going through Dr. Hough's past biopsy/specimen log, I called and LVM for the pt regarding untreated Aks. Informed pt that Dr. Hough recommends treating these spots via cryosurgery at his follow up appointment on 4/3/24. Also stated that Dr. Hough recommends the PDT for his face or efudex if pt did not want to come to office for treatment. Informed pt to give us a call back to discuss further and stated that we can follow up again at his April appointment.

## 2024-04-02 DIAGNOSIS — I10 HYPERTENSION, UNSPECIFIED TYPE: ICD-10-CM

## 2024-04-03 ENCOUNTER — OFFICE VISIT (OUTPATIENT)
Dept: DERMATOLOGY | Facility: CLINIC | Age: 74
End: 2024-04-03
Payer: MEDICARE

## 2024-04-03 DIAGNOSIS — L57.0 AK (ACTINIC KERATOSIS): Primary | ICD-10-CM

## 2024-04-03 DIAGNOSIS — Z85.828 HISTORY OF NONMELANOMA SKIN CANCER: ICD-10-CM

## 2024-04-03 PROCEDURE — 17004 DESTROY PREMAL LESIONS 15/>: CPT | Mod: S$GLB,,, | Performed by: STUDENT IN AN ORGANIZED HEALTH CARE EDUCATION/TRAINING PROGRAM

## 2024-04-03 PROCEDURE — 1160F RVW MEDS BY RX/DR IN RCRD: CPT | Mod: CPTII,S$GLB,, | Performed by: STUDENT IN AN ORGANIZED HEALTH CARE EDUCATION/TRAINING PROGRAM

## 2024-04-03 PROCEDURE — 3288F FALL RISK ASSESSMENT DOCD: CPT | Mod: CPTII,S$GLB,, | Performed by: STUDENT IN AN ORGANIZED HEALTH CARE EDUCATION/TRAINING PROGRAM

## 2024-04-03 PROCEDURE — 1126F AMNT PAIN NOTED NONE PRSNT: CPT | Mod: CPTII,S$GLB,, | Performed by: STUDENT IN AN ORGANIZED HEALTH CARE EDUCATION/TRAINING PROGRAM

## 2024-04-03 PROCEDURE — 99213 OFFICE O/P EST LOW 20 MIN: CPT | Mod: 25,S$GLB,, | Performed by: STUDENT IN AN ORGANIZED HEALTH CARE EDUCATION/TRAINING PROGRAM

## 2024-04-03 PROCEDURE — 99999 PR PBB SHADOW E&M-EST. PATIENT-LVL II: CPT | Mod: PBBFAC,,, | Performed by: STUDENT IN AN ORGANIZED HEALTH CARE EDUCATION/TRAINING PROGRAM

## 2024-04-03 PROCEDURE — 1159F MED LIST DOCD IN RCRD: CPT | Mod: CPTII,S$GLB,, | Performed by: STUDENT IN AN ORGANIZED HEALTH CARE EDUCATION/TRAINING PROGRAM

## 2024-04-03 PROCEDURE — 1101F PT FALLS ASSESS-DOCD LE1/YR: CPT | Mod: CPTII,S$GLB,, | Performed by: STUDENT IN AN ORGANIZED HEALTH CARE EDUCATION/TRAINING PROGRAM

## 2024-04-03 RX ORDER — METOPROLOL SUCCINATE 25 MG/1
25 TABLET, EXTENDED RELEASE ORAL DAILY
Qty: 180 TABLET | Refills: 2 | Status: SHIPPED | OUTPATIENT
Start: 2024-04-03 | End: 2024-04-19 | Stop reason: SDUPTHER

## 2024-04-03 NOTE — TELEPHONE ENCOUNTER
Refill Decision Note   Shon Elif  is requesting a refill authorization.  Brief Assessment and Rationale for Refill:  Approve     Medication Therapy Plan:         Comments:     Note composed:2:45 PM 04/03/2024            
----- Message from Vinicio Tillman sent at 4/2/2024 11:09 AM CDT -----  Contact: [phone]@patient  Requesting an RX refill or new RX.metoprolol succinate (TOPROL-XL) 25 MG 24 hr tablet  Is this a refill or new RX: refill   RX name and strength (copy/paste from chart):    Is this a 30 day or 90 day RX: 90 days   Pharmacy name and phone # [id number] - [place], [place] - [facility] [address] [facility] [address]   Phone: [phone]  The doctors have asked that we provide their patients with the following 2 reminders -- prescription refills can take up to 72 hours, and a friendly reminder that in the future you can use your [facility] account to request refills:       
No care due was identified.  Health [facility] Embedded Care Due Messages. Reference number: [id number].   4/02/2024 11:18:16 AM CDT  
no

## 2024-04-03 NOTE — PROGRESS NOTES
Subjective:      Patient ID:  Shon Asencio is a 73 y.o. male who presents for   Chief Complaint   Patient presents with    Skin Check     UBSE     Shon Asencio is a 73 y.o. male who presents for: UBSE screening exam for skin cancer.    Last office visit 1/3/24 with me for evaluation of skin lesions and biopsies:   1. Skin, left forearm, shave biopsy:   -HYPERTROPHIC ACTINIC KERATOSIS, BROADLY TRANSECTED THE BASE   2. Skin, right preauricular cheek, shave biopsy:   -HYPERTROPHIC ACTINIC KERATOSIS, INVOLVING HAIR FOLLICLES AND EXTENDING TO THE BASE OF THE BIOPSY     Pt states he was never contacted about scheduling PDT.  Here for cryotherapy treatment.  The patient has no specific concerns today.    Personal history of skin cancer: Yes  Hx NMSC on left nose s/p Mohs with Dr. Rosenberg 2012   He thinks he has also had efudex and PDT before as well    Review of Systems   Skin:  Positive for activity-related sunscreen use. Negative for daily sunscreen use and recent sunburn.   Hematologic/Lymphatic: Does not bruise/bleed easily.       Objective:   Physical Exam   Skin:   Areas Examined (abnormalities noted in diagram):   Head / Face Inspection Performed  Neck Inspection Performed  Back Inspection Performed  RUE Inspected  LUE Inspection Performed                 Diagram Legend     Erythematous scaling macule/papule c/w actinic keratosis       Vascular papule c/w angioma      Pigmented verrucoid papule/plaque c/w seborrheic keratosis      Yellow umbilicated papule c/w sebaceous hyperplasia      Irregularly shaped tan macule c/w lentigo     1-2 mm smooth white papules consistent with Milia      Movable subcutaneous cyst with punctum c/w epidermal inclusion cyst      Subcutaneous movable cyst c/w pilar cyst      Firm pink to brown papule c/w dermatofibroma      Pedunculated fleshy papule(s) c/w skin tag(s)      Evenly pigmented macule c/w junctional nevus     Mildly variegated pigmented, slightly  irregular-bordered macule c/w mildly atypical nevus      Flesh colored to evenly pigmented papule c/w intradermal nevus       Pink pearly papule/plaque c/w basal cell carcinoma      Erythematous hyperkeratotic cursted plaque c/w SCC      Surgical scar with no sign of skin cancer recurrence      Open and closed comedones      Inflammatory papules and pustules      Verrucoid papule consistent consistent with wart     Erythematous eczematous patches and plaques     Dystrophic onycholytic nail with subungual debris c/w onychomycosis     Umbilicated papule    Erythematous-base heme-crusted tan verrucoid plaque consistent with inflamed seborrheic keratosis     Erythematous Silvery Scaling Plaque c/w Psoriasis     See annotation      Assessment / Plan:        Actinic keratoses  Treated two HAKs biopsied LV- well healed today no evidence SCC  Cryosurgery Procedure Note    Verbal consent from the patient is obtained including, but not limited to, risk of hypopigmentation/hyperpigmentation, scar, recurrence of lesion. The patient is aware of the precancerous quality and need for treatment of these lesions. Liquid nitrogen cryosurgery is applied to the 15 actinic keratoses, as detailed in the physical exam, to produce a freeze injury. The patient is aware that blisters may form and is instructed on wound care with gentle cleansing and use of vaseline ointment to keep moist until healed. The patient is supplied a handout on cryosurgery and is instructed to call if lesions do not completely resolve.    Recommend PDT to face, dorsal forearms/hands due to diffuse field cancerization/photodamage- sent message to Jacque AVALOS to schedule  Strict sun protection 48 h after    History of NMSC  Examined areas of prior scars, no evidence of recurrence  - Continue skin exams at least annually, increased risk of additional skin cancers developing in the future  - I discussed the importance of sun protection with the patient. Recommend daily  use of SPF 30+ physical or mineral sunscreen, apply 15 minutes before going outdoors and reapply every 2 hours. Discussed wide-brimmed hats and UPF 50+ clothing.    RTC 6 months, sooner prn

## 2024-04-09 ENCOUNTER — TELEPHONE (OUTPATIENT)
Dept: PRIMARY CARE CLINIC | Facility: CLINIC | Age: 74
End: 2024-04-09
Payer: MEDICARE

## 2024-04-09 NOTE — TELEPHONE ENCOUNTER
----- Message from Johan Benedict sent at 4/9/2024  8:18 AM CDT -----  Contact: Pt  [phone]  Pt called in regards to medication metoprolol succinate (TOPROL-XL) 25 MG 24 hr tablet hw would like to know why the dosage has been changed please advise

## 2024-04-10 ENCOUNTER — CLINICAL SUPPORT (OUTPATIENT)
Dept: DERMATOLOGY | Facility: CLINIC | Age: 74
End: 2024-04-10
Payer: MEDICARE

## 2024-04-10 DIAGNOSIS — L57.0 AK (ACTINIC KERATOSIS): Primary | ICD-10-CM

## 2024-04-10 PROCEDURE — 96567 PDT DSTR PRMLG LES SKN: CPT | Mod: S$GLB,,, | Performed by: STUDENT IN AN ORGANIZED HEALTH CARE EDUCATION/TRAINING PROGRAM

## 2024-04-10 PROCEDURE — 99999 PR PBB SHADOW E&M-EST. PATIENT-LVL I: CPT | Mod: PBBFAC,,,

## 2024-04-10 NOTE — PATIENT INSTRUCTIONS
PHOTODYNAMIC THERAPY POST-OP INSTRUCTIONS      Great Job! You have finished your Will-Light treatment. The treatment you have completed will destroy precancerous cells in the skin and reduce your risk of developing skin cancer in the future.    What to expect when you leave the office:  You may notice some redness and swelling similar to a sunburn. This is normal and typical with the Will-Light procedure. In some cases, crusting or blistering of the skin that was treated may occur. This also is normal, and is actually a good sign that the treatment is working to destroy cancerous and precancerous cells. These symptoms typically resolve within a few days, but in some cases more robust reactions may last as long as 1-2 weeks.    Daily Instructions:  Your skin will be very sensitive to sunlight for up to 7 days. You will need to wear sunscreen (SPF30 or higher), as well as clothing that will protect your skin from the sun (wide-brimmed hat, long-sleeved shirts, etc). We recommend that you plan to use sunscreen forever! You must avoid direct sunlight for 48 hours following PDT treatment. Window light should also be avoided during the first 48 hours.    At Night:  If your skin is dry/red/tender/crusting, apply Vaseline/petroleum jelly to the area to moisturize the skin. If needed, you may also soak the treatment area in Epson Salt/Domboro bath for a few minutes to help relieve itching and remove crust or scale.    Itching/ Burning Sensations can be treated by:  Keeping the treatment area covered with a layer of Vaseline/Pretoleum jelly/aquaphor.  Extra strength Tylenol and Benadryl  Epson Salt or Domboro Soaks    Future Appointments:  Remember while the Will-Light treatment greatly reduces your risk of skin cancer and precancerous growths, it does not eliminate the risk completely. It remains important you have close follow-up with your dermatologist.     You should still have regular exams to detect skin cancer as early as  possible and treat precancers not destroyed by the Will-Light treatment. A follow up appointment should be made 1 to 3 months following your Will-Light treatment.    Call us with any questions or problems at [phone]              POST PDT       Do not pick at the skin while it is peeling.    Use gentle unscented mild cleanser such as cetaphil.   Ibuprofen can be taken as directed for any pain or discomfort.   Do not let skin dry out on the treated area. Apply a thin layer of Aquaphor or    Vaseline jelly   frequently / as needed.   Keep treated skin away from direct sunlight (outside and windows) for 48    hours after treatment.   Apply SPF everyday   Notify our office if you have any questions or concerns at [phone].

## 2024-04-10 NOTE — PROGRESS NOTES
Photodynamic Therapy Note.     PDT ordered per Dr. Hough     Patient here today for treatment of actinic keratoses using photodynamic therapy.  Risks including but not limited to burning, stinging, redness, swelling, crusting or blistering of the skin of the area treated were discussed with patient.  Patient elects to proceed with photodynamic therapy.     Treatment area:  Face  Treatment area cleaned with rubbing alcohol, 1 Levulan Kerastick (NDC: [id number]) applied evenly to entire surface and allowed to absorb for 60 minutes.  Patient then placed under Will-U light for 16 minutes 40 seconds.     Patient tolerated treatment well with only mild but tolerable symptoms of discomfort.  Area washed gently with mild soap and water; Zinc oxide sunscreen applied.  Patient advised to avoid any significant light exposure (sun and artificial) for next 48 hours.     RTC:  In 1 month or sooner if any problems arise.

## 2024-04-19 DIAGNOSIS — I10 HYPERTENSION, UNSPECIFIED TYPE: ICD-10-CM

## 2024-04-19 RX ORDER — METOPROLOL SUCCINATE 25 MG/1
25 TABLET, EXTENDED RELEASE ORAL DAILY
Qty: 180 TABLET | Refills: 2 | Status: SHIPPED | OUTPATIENT
Start: 2024-04-19

## 2024-04-19 NOTE — TELEPHONE ENCOUNTER
No care due was identified.  Health [facility] Embedded Care Due Messages. Reference number: [id number].   4/19/2024 11:01:58 AM CDT

## 2024-05-08 ENCOUNTER — CLINICAL SUPPORT (OUTPATIENT)
Dept: DERMATOLOGY | Facility: CLINIC | Age: 74
End: 2024-05-08
Payer: MEDICARE

## 2024-05-08 DIAGNOSIS — L57.0 AK (ACTINIC KERATOSIS): Primary | ICD-10-CM

## 2024-05-08 PROCEDURE — 96567 PDT DSTR PRMLG LES SKN: CPT | Mod: S$GLB,,, | Performed by: STUDENT IN AN ORGANIZED HEALTH CARE EDUCATION/TRAINING PROGRAM

## 2024-05-08 PROCEDURE — 99999 PR PBB SHADOW E&M-EST. PATIENT-LVL II: CPT | Mod: PBBFAC,,,

## 2024-05-08 NOTE — PROGRESS NOTES
Photodynamic Therapy Note.    PDT ordered per Dr. Hough    Patient here today for treatment of actinic keratoses using photodynamic therapy.  Risks including but not limited to burning, stinging, redness, swelling, crusting or blistering of the skin of the area treated were discussed with patient.  Patient elects to proceed with photodynamic therapy.    Treatment area:  dorsal forearms and hands  Treatment area cleaned with rubbing alcohol, 2 Levulan Kerastick (NDC: [id number]) applied evenly to entire surface and allowed to absorb for 60 minutes.  Patient then placed under Will-U light for 16 minutes 40 seconds.    Patient tolerated treatment well with only mild but tolerable symptoms of discomfort.  Area washed gently with mild soap and water; Zinc oxide sunscreen applied.  Patient advised to avoid any significant light exposure (sun and artificial) for next 48 hours.    RTC:  In 1 month or sooner if any problems arise.

## 2024-05-08 NOTE — PATIENT INSTRUCTIONS
PHOTODYNAMIC THERAPY POST-OP INSTRUCTIONS      Great Job! You have finished your Will-Light treatment. The treatment you have completed will destroy precancerous cells in the skin and reduce your risk of developing skin cancer in the future.    What to expect when you leave the office:  You may notice some redness and swelling similar to a sunburn. This is normal and typical with the Will-Light procedure. In some cases, crusting or blistering of the skin that was treated may occur. This also is normal, and is actually a good sign that the treatment is working to destroy cancerous and precancerous cells. These symptoms typically resolve within a few days, but in some cases more robust reactions may last as long as 1-2 weeks.    Daily Instructions:  Your skin will be very sensitive to sunlight for up to 7 days. You will need to wear sunscreen (SPF30 or higher), as well as clothing that will protect your skin from the sun (wide-brimmed hat, long-sleeved shirts, etc). We recommend that you plan to use sunscreen forever! You must avoid direct sunlight for 48 hours following PDT treatment. Window light should also be avoided during the first 48 hours.    At Night:  If your skin is dry/red/tender/crusting, apply Vaseline/petroleum jelly to the area to moisturize the skin. If needed, you may also soak the treatment area in Epson Salt/Domboro bath for a few minutes to help relieve itching and remove crust or scale.    Itching/ Burning Sensations can be treated by:  Keeping the treatment area covered with a layer of Vaseline/Pretoleum jelly/aquaphor.  Extra strength Tylenol and Benadryl  Epson Salt or Domboro Soaks    Future Appointments:  Remember while the Will-Light treatment greatly reduces your risk of skin cancer and precancerous growths, it does not eliminate the risk completely. It remains important you have close follow-up with your dermatologist.     You should still have regular exams to detect skin cancer as early as  possible and treat precancers not destroyed by the Will-Light treatment. A follow up appointment should be made 1 to 3 months following your Will-Light treatment.    Call us with any questions or problems at [phone]              POST PDT       Do not pick at the skin while it is peeling.    Use gentle unscented mild cleanser such as cetaphil.   Ibuprofen can be taken as directed for any pain or discomfort.   Do not let skin dry out on the treated area. Apply a thin layer of Aquaphor or    Vaseline jelly   frequently / as needed.   Keep treated skin away from direct sunlight (outside and windows) for 48    hours after treatment.   Apply SPF everyday   Notify our office if you have any questions or concerns at [phone].

## 2024-05-10 ENCOUNTER — TELEPHONE (OUTPATIENT)
Dept: PRIMARY CARE CLINIC | Facility: CLINIC | Age: 74
End: 2024-05-10
Payer: MEDICARE

## 2024-05-10 NOTE — TELEPHONE ENCOUNTER
----- Message from Horacio Castillo sent at 5/10/2024  1:47 PM CDT -----  Contact: [phone]  Requesting an RX refill or new RX.  Is this a refill or new RX: refill   RX name and strength (copy/paste from chart):  metoprolol succinate (TOPROL-XL) 25 MG 24 hr tablet  Is this a 30 day or 90 day RX: 90  Pharmacy name and phone # (copy/paste from chart):      [id number] - [place] - [address] AT [facility] [address]  [address] [place] [address]  Phone: [phone] Fax: [phone]       The doctors have asked that we provide their patients with the following 2 reminders -- prescription refills can take up to 72 hours, and a friendly reminder that in the future you can use your [facility] account to request refills: y

## 2024-06-05 ENCOUNTER — OFFICE VISIT (OUTPATIENT)
Dept: PRIMARY CARE CLINIC | Facility: CLINIC | Age: 74
End: 2024-06-05
Payer: MEDICARE

## 2024-06-05 VITALS
OXYGEN SATURATION: 94 % | DIASTOLIC BLOOD PRESSURE: 86 MMHG | HEART RATE: 84 BPM | SYSTOLIC BLOOD PRESSURE: 136 MMHG | WEIGHT: 315 LBS | HEIGHT: 75 IN | BODY MASS INDEX: 39.17 KG/M2 | RESPIRATION RATE: 18 BRPM

## 2024-06-05 DIAGNOSIS — G89.29 CHRONIC PAIN OF LEFT KNEE: Primary | ICD-10-CM

## 2024-06-05 DIAGNOSIS — E66.01 SEVERE OBESITY (BMI 35.0-39.9) WITH COMORBIDITY: ICD-10-CM

## 2024-06-05 DIAGNOSIS — M54.16 LUMBAR RADICULOPATHY: ICD-10-CM

## 2024-06-05 DIAGNOSIS — M25.562 CHRONIC PAIN OF LEFT KNEE: Primary | ICD-10-CM

## 2024-06-05 DIAGNOSIS — I10 HYPERTENSION, UNSPECIFIED TYPE: ICD-10-CM

## 2024-06-05 DIAGNOSIS — E80.6 HYPERBILIRUBINEMIA: ICD-10-CM

## 2024-06-05 DIAGNOSIS — D69.6 THROMBOCYTOPENIA: ICD-10-CM

## 2024-06-05 DIAGNOSIS — G47.33 OSA (OBSTRUCTIVE SLEEP APNEA): ICD-10-CM

## 2024-06-05 PROCEDURE — 99214 OFFICE O/P EST MOD 30 MIN: CPT | Mod: S$GLB,,, | Performed by: FAMILY MEDICINE

## 2024-06-05 PROCEDURE — 3075F SYST BP GE 130 - 139MM HG: CPT | Mod: CPTII,S$GLB,, | Performed by: FAMILY MEDICINE

## 2024-06-05 PROCEDURE — 3288F FALL RISK ASSESSMENT DOCD: CPT | Mod: CPTII,S$GLB,, | Performed by: FAMILY MEDICINE

## 2024-06-05 PROCEDURE — 1101F PT FALLS ASSESS-DOCD LE1/YR: CPT | Mod: CPTII,S$GLB,, | Performed by: FAMILY MEDICINE

## 2024-06-05 PROCEDURE — 1126F AMNT PAIN NOTED NONE PRSNT: CPT | Mod: CPTII,S$GLB,, | Performed by: FAMILY MEDICINE

## 2024-06-05 PROCEDURE — 99999 PR PBB SHADOW E&M-EST. PATIENT-LVL III: CPT | Mod: PBBFAC,,, | Performed by: FAMILY MEDICINE

## 2024-06-05 PROCEDURE — 1159F MED LIST DOCD IN RCRD: CPT | Mod: CPTII,S$GLB,, | Performed by: FAMILY MEDICINE

## 2024-06-05 PROCEDURE — 3079F DIAST BP 80-89 MM HG: CPT | Mod: CPTII,S$GLB,, | Performed by: FAMILY MEDICINE

## 2024-06-05 RX ORDER — TRAMADOL HYDROCHLORIDE 50 MG/1
50 TABLET ORAL EVERY 6 HOURS PRN
Qty: 30 EACH | Refills: 1 | Status: SHIPPED | OUTPATIENT
Start: 2024-06-05

## 2024-06-05 NOTE — PROGRESS NOTES
HPI:   73-year-old white male in for six-month checkup--- eating well-- +BM-- ambulating well   Hypertension blood pressure 136/86 on metoprolol   Hyperlipidemia no longer on cholesterol medication   Peripheral edema has not had to take Lasix   Atrial fib on Pradaxa and metoprolol   Sciatica doing well tramadol   History varicose vein surgery doing well   Sees Dr. Tang cardiology        ROS:    Skin: no psoriasis, eczema, skin cancer  HEENT: no HA  , no ocular pain, blurred vision, diplopia, epistaxis, hoarseness change in voice, + hypothyroidism  Lung: No pneumonia, asthma, Tb, wheezing, SOB, no smoking  Heart: No chest pain, ankle edema, palpitations, MI, barby murmur,+ hypertension,+  Hyperlipidemia--no stent bypass arrhythmia + atrial fibrillation   Abdomen: No nausea, vomiting, diarrhea, constipation, ulcers, hepatitis, gallbladder disease, melena, hematochezia, hematemesis  : no renal disease, stones --hx UTI last visit   MS: no fractures, +O/A--both knees- little better  no lupus, rheumatoid, gout right shoulder pain comes and goes some days worse than others using Aspercreme  Neuro: no dizziness--, no LOC, seizures   No diabetes, no anemia, no anxiety, no n depression prefers no medication tries to get over things on her own  , 2 children, lives with  --not working times 41 years  Objective:   Physical Exam:    General: Well nourished, well developed, no acute distress + overweight  Skin: lesion right lower leg--good eschar--no erythema swelling or discharge  HEENT: Eyes PERRLA, EOM intact, nose clear  throat nonerythematous ears TMs clear  NECK: Supple, no bruits, No JVD, no nodes  Lungs: Clear, no rales, rhonchi, wheezing   Heart: Regular rate and rhythm, no murmurs, gallops, or rubs  Abdomen: flat, bowel sounds positive, no tenderness, or organomegaly  MS:  Pain in knees bilaterally--difficulty getting up from a chair due to pain in knees--flexion extension popping sensation right  knee crepitus in the knees bilaterally pain with squatting can only squat senior care down able ambulate but some disco  Neuro: Alert, CN intact, oriented X 3 Romberg negative heel-toe intact  Extremities: No cyanosis, clubbing, or edema         Assessment:       1. Hyperlipidemia, unspecified hyperlipidemia type    2. Essential hypertension        Plan:       Hyperlipidemia, unspecified hyperlipidemia type    Essential hypertension  -     lisinopriL (PRINIVIL,ZESTRIL) 20 MG tablet; Take 1 tablet (20 mg total) by mouth once daily.  Dispense: 30 tablet; Refill: 0  History of atrial fibrillation  Chronic back pain with new exacerbation  Lumbar radiculopathy  Morbid obesity  Osteoarthritis  Obstructive sleep apnea      Main Reason here-  Six-month checkup  Hyper lipidemia-- lipids within normal limits patient  off of atorvastatin-- will probably remove this diagnosis next visit if still normal  Hypertension metoprolol   136/86  Peripheral edema on Lasix-- has not had to take recently  History lumbosacral strain--had nerve block that lasted 2 years--going to [place] [place] [employer] [place] needs some Ultram for trip to see neurosurgeon for possible epidural steroid injection again   Osteoarthritis both knees patient Needs handicap license only able walk sees Dr. Ray who injects patient's knees did discuss a knee replacement    Thrombocytopenia platelets 129   Hyperbilirubinemia bilirubin 1.1   bilateral osteoarthritis knees --x-ray both knees--see DR Verdugo --?  Inject both knees--discuss knee replacement  Lab done by Dr. Tang  [facility] maintenance [facility]  Six months refills all medications

## 2024-08-02 ENCOUNTER — HOSPITAL ENCOUNTER (OUTPATIENT)
Dept: RADIOLOGY | Facility: HOSPITAL | Age: 74
Discharge: HOME | End: 2024-08-02
Attending: ORTHOPAEDIC SURGERY
Payer: MEDICARE

## 2024-08-02 ENCOUNTER — OFFICE VISIT (OUTPATIENT)
Dept: SPORTS MEDICINE | Facility: CLINIC | Age: 74
End: 2024-08-02
Payer: MEDICARE

## 2024-08-02 VITALS
SYSTOLIC BLOOD PRESSURE: 144 MMHG | BODY MASS INDEX: 39.17 KG/M2 | HEART RATE: 82 BPM | WEIGHT: 315 LBS | DIASTOLIC BLOOD PRESSURE: 102 MMHG | HEIGHT: 75 IN

## 2024-08-02 DIAGNOSIS — M25.511 RIGHT SHOULDER PAIN, UNSPECIFIED CHRONICITY: Primary | ICD-10-CM

## 2024-08-02 DIAGNOSIS — M25.511 RIGHT SHOULDER PAIN, UNSPECIFIED CHRONICITY: ICD-10-CM

## 2024-08-02 PROCEDURE — 73030 X-RAY EXAM OF SHOULDER: CPT | Mod: 26,RT,, | Performed by: INTERNAL MEDICINE

## 2024-08-02 PROCEDURE — 73030 X-RAY EXAM OF SHOULDER: CPT | Mod: TC,RT

## 2024-08-02 PROCEDURE — 99999 PR PBB SHADOW E&M-EST. PATIENT-LVL III: CPT | Mod: PBBFAC,,, | Performed by: ORTHOPAEDIC SURGERY

## 2024-08-02 RX ORDER — TRIAMCINOLONE ACETONIDE 40 MG/ML
40 INJECTION, SUSPENSION INTRA-ARTICULAR; INTRAMUSCULAR
Status: DISCONTINUED | OUTPATIENT
Start: 2024-08-02 | End: 2024-08-02 | Stop reason: HOSPADM

## 2024-08-02 RX ADMIN — TRIAMCINOLONE ACETONIDE 40 MG: 40 INJECTION, SUSPENSION INTRA-ARTICULAR; INTRAMUSCULAR at 09:08

## 2024-08-02 NOTE — PROCEDURES
Large Joint Aspiration/Injection: R subacromial bursa    Date/Time: 8/2/2024 9:15 AM    Performed by: Aris Lopez MD  Authorized by: Aris Lopez MD    Consent Done?:  Yes (Verbal)  Indications:  Pain  Site marked: the procedure site was marked    Timeout: prior to procedure the correct patient, procedure, and site was verified    Prep: patient was prepped and draped in usual sterile fashion    Local anesthesia used?: No      Details:  Needle Size:  22 G  Ultrasonic Guidance for needle placement?: No    Approach:  Posterior  Location:  Shoulder  Site:  R subacromial bursa  Medications:  40 mg triamcinolone acetonide 40 mg/mL  Patient tolerance:  Patient tolerated the procedure well with no immediate complications

## 2024-08-02 NOTE — PROGRESS NOTES
CC: RIGHT shoulder pain     73 y.o. Male with a 50 year history of right shoulder atraumatic pain. Patient is a retired . 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 He reports remote history of an injury 50 years ago that led to chronic right shoulder pain. Over the past month he has had exacerbation of this right shoulder pain that wakes him up from sleeping. He also has mild pain with overhead activity and fishing that has led to activity modification and avoiding fishing activities. He takes a daily celebrex for long standing right elbow pain.     He states that the pain is severe and not responding to any conservative care.      He reports that the pain and weakness is worse with overhead activity. It also bothers him at night.    Is affecting ADLs.  Pain is 7/10 at it's worst.      Past Medical History:   Diagnosis Date    Anticoagulant long-term use     Arthritis     Atrial fibrillation     Basal cell carcinoma     Chronic low back pain     DDD (degenerative disc disease), lumbar     History of colon polyps     HLD (hyperlipidemia)     Hypertension     Lumbar radiculopathy     Lumbar spondylosis     OA (osteoarthritis)     Personal history of PE (pulmonary embolism) 2001    Sleep apnea     cpap at night    Unilateral edema of lower extremity     Varicose veins of both lower extremities     Venous insufficiency (chronic) (peripheral)        Past Surgical History:   Procedure Laterality Date    CARDIOVERSION      COLONOSCOPY  03/13/2020    COLONOSCOPY N/A 3/13/2020    Procedure: COLONOSCOPY;  Surgeon: Grant Meneses MD;  Location: [facility] ENDO;  Service: Endoscopy;  Laterality: N/A;    COLONOSCOPY N/A 6/12/2023    Procedure: COLONOSCOPY;  Surgeon: Cole Stevens MD;  Location: [facility] ENDO;  Service: Endoscopy;  Laterality: N/A;    CORONARY ANGIOGRAPHY Right 5/16/2019    Procedure: ANGIOGRAM, CORONARY ARTERY;  Surgeon: Nabil Tang MD;  Location: [facility] CATH LAB;   Service: Cardiology;  Laterality: Right;    INJECTION OF FACET JOINT Bilateral 11/11/2019    Procedure: FACET JOINT INJECTION (LUMBAR BLOCK) BILATERAL L4/5;  Surgeon: Trevin Root MD;  Location: [facility] PAIN MGT;  Service: Pain Management;  Laterality: Bilateral;  NEEDS CONSENT, PRADAXA     KNEE ARTHROSCOPY      LEFT HEART CATHETERIZATION Right 5/16/2019    Procedure: Left heart cath;  Surgeon: Nabil Tang MD;  [facility];  Service: Cardiology;  Laterality: Right;    NASAL SEPTUM SURGERY Left 2012       Family History   Problem Relation Name Age of Onset    No Known Problems Mother      No Known Problems Father           Current Outpatient Medications:     celecoxib (CELEBREX) 100 MG capsule, Take 1 capsule (100 mg total) by mouth once daily., Disp: 90 capsule, Rfl: 3    chlorhexidine (PERIDEX) 0.12 % solution, SMARTSIG:Topical As Directed, Disp: , Rfl:     garlic 1 mg Cap, Take 1 tablet by mouth once daily. , Disp: , Rfl:     meclizine (ANTIVERT) 25 mg tablet, One p.o. q.h.s. or if needed  take 1 p.o. q.8 hours p.r.n. dizziness or vertigo, Disp: 30 tablet, Rfl: 0    metoprolol succinate (TOPROL-XL) 25 MG 24 hr tablet, Take 1 tablet (25 mg total) by mouth once daily. Pt. Takes 25 mg in morning and 12.5 in the evening., Disp: 180 tablet, Rfl: 2    potassium chloride (MICRO-K) 10 MEQ CpSR, One p.o. q.d. with Lasix only, Disp: 90 capsule, Rfl: 3    PRADAXA 150 mg Cap, Take 1 capsule (150 mg total) by mouth 2 (two) times daily., Disp: 180 capsule, Rfl: 1    traMADoL (ULTRAM) 50 mg tablet, Take 1 tablet (50 mg total) by mouth every 6 (six) hours as needed for Pain., Disp: 30 each, Rfl: 1  No current facility-administered medications for this visit.    Facility-Administered Medications Ordered in Other Visits:     0.9%  NaCl infusion, , Intravenous, Continuous, Cole Stevens MD    lactated ringers infusion, , Intravenous, Continuous, Grant Meneses MD, New Bag at 03/13/20 0919     "LIDOcaine (PF) 10 mg/ml (1%) injection 10 mg, 1 mL, Intradermal, Once PRN, Cole Stevens MD    Review of patient's allergies indicates:  No Known Allergies       REVIEW OF SYSTEMS:  Constitution: Negative. Negative for chills, fever and night sweats.   HENT: Negative for congestion and headaches.    Eyes: Negative for blurred vision, left vision loss and right vision loss.   Cardiovascular: Negative for chest pain and syncope.   Respiratory: Negative for cough and shortness of breath.    Endocrine: Negative for polydipsia, polyphagia and polyuria.   Hematologic/Lymphatic: Negative for bleeding problem. Does not bruise/bleed easily.   Skin: Negative for dry skin, itching and rash.   Musculoskeletal: Negative for falls.  Positive for right shoulder pain and muscle weakness.   Gastrointestinal: Negative for abdominal pain and bowel incontinence.   Genitourinary: Negative for bladder incontinence and nocturia.   Neurological: Negative for disturbances in coordination, loss of balance and seizures.   Psychiatric/Behavioral: Negative for depression. The patient does not have insomnia.    Allergic/Immunologic: Negative for hives and persistent infections.      PHYSICAL EXAMINATION:  Vitals:  BP (!) 144/102   Pulse 82   Ht 6' 3" (1.905 m)   Wt (!) 144 kg (317 lb 7.4 oz)   BMI 39.68 kg/m²    General: The patient is alert and oriented x 3.  Mood is pleasant.  Observation of ears, eyes and nose reveal no gross abnormalities.  No labored breathing observed.  Gait is coordinated. Patient can toe walk and heel walk without difficulty.      RIGHT SHOULDER / UPPER EXTREMITY EXAM    OBSERVATION:     Swelling  none  Deformity  none   Discoloration  none   Scapular winging none   Scars   none  Atrophy  none    TENDERNESS / CREPITUS (T/C):          T/C      T/C   Clavicle   -/-  SUPRAspinatus    -/-     AC Jt.    -/+  INFRAspinatus  -/-    SC Jt.    -/-  Deltoid    -/-      G. Tuberosity  -/-  LH BICEP " groove  -/-   Acromion:  -/-  Midline Neck   -/-     Scapular Spine -/-  Trapezium   -/-   SMA Scapula  -/-  GH jt. line - post  -/+     Scapulothoracic  -/-         ROM: (* = with pain)  Left shoulder   Right shoulder        AROM (PROM)   AROM (PROM)   FE    170° (175°)     170°* (175°)     ER at 0°    60°  (65°)    60°  (65°)   ER at 90° ABD  90°  (90°)    90°  (90°)   IR at 90°  ABD   NA  (40°)     NA  (40°)      IR (spine level)   T10     T10    STRENGTH: (* = with pain) Left shoulder   Right shoulder   SCAPTION   5/5    5/5*    IR    5/5    5/5   ER    5/5    5/5   BICEPS   5/5    5/5   Deltoid    5/5    5/5     SIGNS:  Painful side       NEER   -    ONURYS  neg    GONZALES   -    SPEEDS  neg     DROP ARM   -   BELLY PRESS neg   Superior escape none    LIFT-OFF  neg   X-Body ADD    neg    MOVING VALGUS neg          EXTREMITY NEURO-VASCULAR EXAM:    Sensation grossly intact to light touch all dermatomal regions.    DTR 2+ Biceps, Triceps, BR and Negative Geraldos sign   Grossly intact motor function at Elbow, Wrist and Hand   Distal pulses radial and ulnar 2+, brisk cap refill, symmetric.      NECK:  Painless FROM and spinous processes non-tender. Negative Spurlings sign.      OTHER FINDINGS:  No scapular dyskinesia    XRAYS:  Xrays including AP, Outlet and Axillary Lateral of shoulder are ordered / images reviewed by me:   No fracture dislocation or other pathology   Acromion type 2   Proximal migration of humeral head: None   GH arthritis: Moderate to severe          ASSESSMENT:   Right shoulder pain: glenohumeral osteoarthritis   1. Right shoulder pain, unspecified chronicity        PLAN:      1. CSI right shoulder  2. Continue daily celebrex prn  3. Return to clinic prn     All questions were answered, patient will contact us for questions or concerns in the interim.

## 2024-08-09 RX ORDER — DABIGATRAN ETEXILATE MESYLATE 150 MG/1
150 CAPSULE ORAL 2 TIMES DAILY
Qty: 180 CAPSULE | Refills: 0 | Status: SHIPPED | OUTPATIENT
Start: 2024-08-09

## 2024-10-02 NOTE — PROGRESS NOTES
Patient ID by name and . NKDA. Influenza High Dose vaccine given IM in right deltoid using aseptic technique. Aspirated with no blood noted. Patient tolerated well. Given per physicians order. No adverse reaction noted.   
no

## 2024-10-23 ENCOUNTER — OFFICE VISIT (OUTPATIENT)
Dept: PRIMARY CARE CLINIC | Facility: CLINIC | Age: 74
End: 2024-10-23
Payer: MEDICARE

## 2024-10-23 VITALS
DIASTOLIC BLOOD PRESSURE: 70 MMHG | BODY MASS INDEX: 39.17 KG/M2 | HEIGHT: 75 IN | OXYGEN SATURATION: 93 % | SYSTOLIC BLOOD PRESSURE: 116 MMHG | WEIGHT: 315 LBS | HEART RATE: 87 BPM | RESPIRATION RATE: 18 BRPM

## 2024-10-23 DIAGNOSIS — M54.16 LUMBAR RADICULOPATHY: ICD-10-CM

## 2024-10-23 DIAGNOSIS — S80.811D ABRASION, RIGHT LOWER LEG, SUBSEQUENT ENCOUNTER: Primary | ICD-10-CM

## 2024-10-23 DIAGNOSIS — M25.562 ACUTE PAIN OF LEFT KNEE: ICD-10-CM

## 2024-10-23 DIAGNOSIS — Z86.79 HISTORY OF ATRIAL FIBRILLATION: ICD-10-CM

## 2024-10-23 DIAGNOSIS — S39.012D LUMBOSACRAL STRAIN, SUBSEQUENT ENCOUNTER: ICD-10-CM

## 2024-10-23 DIAGNOSIS — I10 HYPERTENSION, UNSPECIFIED TYPE: ICD-10-CM

## 2024-10-23 DIAGNOSIS — M25.451 SWELLING OF JOINT OF PELVIC REGION OR THIGH, RIGHT: ICD-10-CM

## 2024-10-23 DIAGNOSIS — G47.33 OSA (OBSTRUCTIVE SLEEP APNEA): ICD-10-CM

## 2024-10-23 PROCEDURE — 99999 PR PBB SHADOW E&M-EST. PATIENT-LVL IV: CPT | Mod: PBBFAC,,, | Performed by: FAMILY MEDICINE

## 2024-10-23 PROCEDURE — 99214 OFFICE O/P EST MOD 30 MIN: CPT | Mod: S$GLB,,, | Performed by: FAMILY MEDICINE

## 2024-10-23 PROCEDURE — 3078F DIAST BP <80 MM HG: CPT | Mod: CPTII,S$GLB,, | Performed by: FAMILY MEDICINE

## 2024-10-23 PROCEDURE — 1159F MED LIST DOCD IN RCRD: CPT | Mod: CPTII,S$GLB,, | Performed by: FAMILY MEDICINE

## 2024-10-23 PROCEDURE — 3074F SYST BP LT 130 MM HG: CPT | Mod: CPTII,S$GLB,, | Performed by: FAMILY MEDICINE

## 2024-10-23 PROCEDURE — 1101F PT FALLS ASSESS-DOCD LE1/YR: CPT | Mod: CPTII,S$GLB,, | Performed by: FAMILY MEDICINE

## 2024-10-23 PROCEDURE — 1126F AMNT PAIN NOTED NONE PRSNT: CPT | Mod: CPTII,S$GLB,, | Performed by: FAMILY MEDICINE

## 2024-10-23 PROCEDURE — 3288F FALL RISK ASSESSMENT DOCD: CPT | Mod: CPTII,S$GLB,, | Performed by: FAMILY MEDICINE

## 2024-10-23 RX ORDER — TRAMADOL HYDROCHLORIDE 50 MG/1
50 TABLET ORAL EVERY 6 HOURS PRN
Qty: 30 EACH | Refills: 1 | Status: SHIPPED | OUTPATIENT
Start: 2024-10-23

## 2024-10-23 NOTE — PROGRESS NOTES
HPI: 75 yo WM in for falling thru floor--in living room--putting new andie down---put cover over rotten wood --wife moved rug--3-4 feet --landed on ground with right leg--left up at floor level perpendicular to the body forming a T.  Patient went to urgent care--yesterday pt had abrasion --patient had pain in the right lateral thigh--states that it feel somewhat hard--also pain in the lower back--now some pain in the left knee.  Right knee feels okay.  Area of the abrasion is tender but no pain in the right lower leg.  Abrasion shows an elliptical area proximally 6 cm--was cleaned --placed yellow pad--told change in next few days.  Right eye feels like there is always a cramp in it .  Had tetanus shot.  Other medical issues   Hypertension blood pressure 136/86 on metoprolol   Hyperlipidemia no longer on cholesterol medication   Peripheral edema has not had to take Lasix   Atrial fib on Pradaxa and metoprolol   Sciatica doing well tramadol   History varicose vein surgery doing well   Sees Dr. Tang cardiology        ROS:  No significant change except for history of present illness  Skin: no psoriasis, eczema, skin cancer--abrasion upper right lower leg elliptical proximally 6 x 1 cm abrasion  HEENT: no HA  , no ocular pain, blurred vision, diplopia, epistaxis, hoarseness change in voice, + hypothyroidism  Lung: No pneumonia, asthma, Tb, wheezing, SOB, no smoking  Heart: No chest pain, ankle edema, palpitations, MI, abrby murmur,+ hypertension,+  Hyperlipidemia--no stent bypass arrhythmia + atrial fibrillation   Abdomen: No nausea, vomiting, diarrhea, constipation, ulcers, hepatitis, gallbladder disease, melena, hematochezia, hematemesis  : no renal disease, stones --hx UTI last visit   MS: no fractures, swelling of the right thigh--laterally--edematous area possible slight hematoma under the skin 20 x 7 cm--slightly tender to touch---tenderness in the lumbar spine bilaterally right greater than left---left  knee also tender slight crepitus with flexion and extension--able to walk but with a limp tends to favor right leg more than left  Neuro: no dizziness--, no LOC, seizures   No diabetes, no anemia, no anxiety, no n depression prefers no medication tries to get over things on her own  , 2 children, lives with  --not working times 41 years  Objective:   Physical Exam:    General: Well nourished, well developed, no acute distress + overweight  Skin: lesion right lower leg--good eschar--no erythema swelling or discharge  HEENT: Eyes PERRLA, EOM intact, nose clear  throat nonerythematous ears TMs clear  NECK: Supple, no bruits, No JVD, no nodes  Lungs: Clear, no rales, rhonchi, wheezing   Heart: Regular rate and rhythm, no murmurs, gallops, or rubs  Abdomen: flat, bowel sounds positive, no tenderness, or organomegaly  MS:  Recent fall through the floor---swelling right lateral thigh 20 x 7 cm--appears to be either edema or hematoma but no ecchymosis noted no erythema area is slightly hard and tender--tenderness lumbar spine L1-S1 anterior flexion 10° extension 10° lateral flexion rotation 10°--left knee some slight crepitus with flexion extension overall good range of motion muscle strength--walks with a limp favoring more of the right leg  Neuro: Alert, CN intact, oriented X 3 Romberg negative heel-toe intact  Extremities: No cyanosis, clubbing, or edema         Assessment:       1. Abrasion right lower leg   2. Left knee pain  Right thigh swelling and pain  Lumbosacral strain         Plan:             Main Reason here-  Fell through the floor in his house--was doing repairs on the floor--area was rotten--wife moved the rug---patient fell through with right lower leg---patient landed on the ground with the right lower leg--left leg was hyperextended to the left as it was caught by the floor--seen by urgent care---abrasion leg cleaned and dressed---tetanus shot.  Patient awoke with pain in the right  thigh--noted 20 x 7 cm area of swelling which appears to be a mixture of edema and possible hematoma but no ecchymosis or erythema this is a lateral thigh---now tenderness in the left knee---also pain in the lower back---and right thigh--will x-ray lumbar spine left knee right thigh and femur---Ultram--no NSAIDs due to blood thinners--can use Tylenol--ret 6 weeks   Other medical issues  Hyper lipidemia-- lipids within normal limits patient  off of atorvastatin-- will probably remove this diagnosis next visit if still normal  Hypertension metoprolol   136/86  Peripheral edema on Lasix-- has not had to take recently  History lumbosacral strain--had nerve block that lasted 2 years--going to [place] [employer] needs some Ultram for trip to see neurosurgeon for possible epidural steroid injection again   Osteoarthritis both knees patient Needs handicap license only able walk sees Dr. Ray who injects patient's knees did discuss a knee replacement    Thrombocytopenia platelets 129   Hyperbilirubinemia bilirubin 1.1   bilateral osteoarthritis knees --x-ray both knees--see DR Verdugo --?  Inject both knees--discuss knee replacement  Lab done by Dr. Tang  [facility] maintenance colonoscopy  Six months refills all medications

## 2024-10-28 ENCOUNTER — OFFICE VISIT (OUTPATIENT)
Dept: PRIMARY CARE CLINIC | Facility: CLINIC | Age: 74
End: 2024-10-28
Payer: MEDICARE

## 2024-10-28 VITALS
HEIGHT: 75 IN | HEART RATE: 95 BPM | OXYGEN SATURATION: 98 % | DIASTOLIC BLOOD PRESSURE: 62 MMHG | SYSTOLIC BLOOD PRESSURE: 100 MMHG | WEIGHT: 315 LBS | BODY MASS INDEX: 39.17 KG/M2

## 2024-10-28 DIAGNOSIS — M25.561 RIGHT KNEE PAIN, UNSPECIFIED CHRONICITY: Primary | ICD-10-CM

## 2024-10-28 DIAGNOSIS — I10 HYPERTENSION, UNSPECIFIED TYPE: ICD-10-CM

## 2024-10-28 DIAGNOSIS — L98.9 SKIN LESION OF RIGHT LEG: ICD-10-CM

## 2024-10-28 DIAGNOSIS — M79.89 RIGHT LEG SWELLING: Primary | ICD-10-CM

## 2024-10-28 DIAGNOSIS — Z86.79 HISTORY OF ATRIAL FIBRILLATION: ICD-10-CM

## 2024-10-28 DIAGNOSIS — R41.3 MEMORY LOSS: ICD-10-CM

## 2024-10-28 DIAGNOSIS — M25.361 RIGHT KNEE GIVES WAY: ICD-10-CM

## 2024-10-28 DIAGNOSIS — R60.9 EDEMA, UNSPECIFIED TYPE: ICD-10-CM

## 2024-10-28 PROCEDURE — 1125F AMNT PAIN NOTED PAIN PRSNT: CPT | Mod: CPTII,S$GLB,, | Performed by: FAMILY MEDICINE

## 2024-10-28 PROCEDURE — 3288F FALL RISK ASSESSMENT DOCD: CPT | Mod: CPTII,S$GLB,, | Performed by: FAMILY MEDICINE

## 2024-10-28 PROCEDURE — 3074F SYST BP LT 130 MM HG: CPT | Mod: CPTII,S$GLB,, | Performed by: FAMILY MEDICINE

## 2024-10-28 PROCEDURE — 3078F DIAST BP <80 MM HG: CPT | Mod: CPTII,S$GLB,, | Performed by: FAMILY MEDICINE

## 2024-10-28 PROCEDURE — 99999 PR PBB SHADOW E&M-EST. PATIENT-LVL III: CPT | Mod: PBBFAC,,, | Performed by: FAMILY MEDICINE

## 2024-10-28 PROCEDURE — 99213 OFFICE O/P EST LOW 20 MIN: CPT | Mod: S$GLB,,, | Performed by: FAMILY MEDICINE

## 2024-10-28 PROCEDURE — 1101F PT FALLS ASSESS-DOCD LE1/YR: CPT | Mod: CPTII,S$GLB,, | Performed by: FAMILY MEDICINE

## 2024-10-29 ENCOUNTER — OFFICE VISIT (OUTPATIENT)
Dept: ORTHOPEDICS | Facility: CLINIC | Age: 74
End: 2024-10-29
Payer: MEDICARE

## 2024-10-29 VITALS
WEIGHT: 315 LBS | DIASTOLIC BLOOD PRESSURE: 75 MMHG | SYSTOLIC BLOOD PRESSURE: 123 MMHG | BODY MASS INDEX: 39.17 KG/M2 | HEIGHT: 75 IN | HEART RATE: 85 BPM

## 2024-10-29 DIAGNOSIS — M17.11 PRIMARY OSTEOARTHRITIS OF RIGHT KNEE: ICD-10-CM

## 2024-10-29 DIAGNOSIS — Z86.718 HISTORY OF DEEP VEIN THROMBOSIS (DVT) OF LOWER EXTREMITY: Primary | ICD-10-CM

## 2024-10-29 PROCEDURE — 99999 PR PBB SHADOW E&M-EST. PATIENT-LVL III: CPT | Mod: PBBFAC,,,

## 2024-11-04 ENCOUNTER — TELEPHONE (OUTPATIENT)
Dept: PRIMARY CARE CLINIC | Facility: CLINIC | Age: 74
End: 2024-11-04
Payer: MEDICARE

## 2024-11-04 NOTE — TELEPHONE ENCOUNTER
----- Message from Shelly sent at 11/4/2024 11:28 AM CST -----  Contact: Self/ [phone]  .[email or web address]     Patient is calling for Medical Advice regarding:leg swelling     How long has patient had these symptoms:ongoing     Pharmacy name and phone#: [facility] [place] - [address]  [address] [place] [address]  Phone: [phone] Fax: [phone]     Patient wants a call back or thru [facility]: call back     Comments: pt said that he is calling in regards to needing to get a return call from the nurse , to discuss an antibiotic for his  leg swelling

## 2024-11-04 NOTE — TELEPHONE ENCOUNTER
----- Message from Shelly sent at 11/4/2024 11:28 AM CST -----  Contact: Self/ [phone]  .[email or web address]     Patient is calling for Medical Advice regarding:leg swelling     How long has patient had these symptoms:ongoing     Pharmacy name and phone#: [facility] - [place] - [address]  [address] [place] [address]  Phone: [phone] Fax: [phone]     Patient wants a call back or thru [facility]: call back     Comments: pt said that he is calling in regards to needing to get a return call from the nurse , to discuss an antibiotic for his  leg swelling

## 2024-11-05 NOTE — TELEPHONE ENCOUNTER
Called patient regarding antibiotic and confirmed appointment for tomorrow to discuss.  Pt verbalized understanding.

## 2024-11-05 NOTE — TELEPHONE ENCOUNTER
----- Message from Cely sent at 11/4/2024  5:50 PM CST -----  Type:  Patient Returning Call    Who Called: pt   Who Left Message for Patient:pt   Does the patient know what this is regarding?:pt was calling back about his Pt is requesting an antibiotic due leg swelling  Would the patient rather a call back or a response via [facility]? call  Best Call Back [phone]  Additional Information: call back

## 2024-11-06 ENCOUNTER — OFFICE VISIT (OUTPATIENT)
Dept: PRIMARY CARE CLINIC | Facility: CLINIC | Age: 74
End: 2024-11-06
Payer: MEDICARE

## 2024-11-06 VITALS
RESPIRATION RATE: 19 BRPM | DIASTOLIC BLOOD PRESSURE: 70 MMHG | WEIGHT: 312.81 LBS | HEIGHT: 75 IN | HEART RATE: 80 BPM | SYSTOLIC BLOOD PRESSURE: 120 MMHG | BODY MASS INDEX: 38.89 KG/M2 | OXYGEN SATURATION: 97 %

## 2024-11-06 DIAGNOSIS — I10 HYPERTENSION, UNSPECIFIED TYPE: ICD-10-CM

## 2024-11-06 DIAGNOSIS — G89.29 CHRONIC PAIN OF LEFT KNEE: ICD-10-CM

## 2024-11-06 DIAGNOSIS — M25.562 CHRONIC PAIN OF LEFT KNEE: ICD-10-CM

## 2024-11-06 DIAGNOSIS — Z86.79 HISTORY OF ATRIAL FIBRILLATION: ICD-10-CM

## 2024-11-06 DIAGNOSIS — S62.512A CLOSED DISPLACED FRACTURE OF PROXIMAL PHALANX OF LEFT THUMB, INITIAL ENCOUNTER: Primary | ICD-10-CM

## 2024-11-06 DIAGNOSIS — G47.33 OSA (OBSTRUCTIVE SLEEP APNEA): ICD-10-CM

## 2024-11-06 DIAGNOSIS — I80.201 DEEP VEIN THROMBOPHLEBITIS OF RIGHT LEG: ICD-10-CM

## 2024-11-06 DIAGNOSIS — S50.312A ABRASION OF LEFT ELBOW, INITIAL ENCOUNTER: ICD-10-CM

## 2024-11-06 DIAGNOSIS — S70.11XD HEMATOMA OF RIGHT THIGH, SUBSEQUENT ENCOUNTER: ICD-10-CM

## 2024-11-06 DIAGNOSIS — I73.9 PERIPHERAL VASCULAR DISEASE OF LOWER EXTREMITY: ICD-10-CM

## 2024-11-06 DIAGNOSIS — S80.211S: ICD-10-CM

## 2024-11-06 DIAGNOSIS — S80.212A ABRASION, LEFT KNEE, INITIAL ENCOUNTER: ICD-10-CM

## 2024-11-06 PROCEDURE — 3074F SYST BP LT 130 MM HG: CPT | Mod: CPTII,S$GLB,, | Performed by: FAMILY MEDICINE

## 2024-11-06 PROCEDURE — 3078F DIAST BP <80 MM HG: CPT | Mod: CPTII,S$GLB,, | Performed by: FAMILY MEDICINE

## 2024-11-06 PROCEDURE — 99213 OFFICE O/P EST LOW 20 MIN: CPT | Mod: S$GLB,,, | Performed by: FAMILY MEDICINE

## 2024-11-06 PROCEDURE — 99999 PR PBB SHADOW E&M-EST. PATIENT-LVL IV: CPT | Mod: PBBFAC,,, | Performed by: FAMILY MEDICINE

## 2024-11-06 PROCEDURE — 1101F PT FALLS ASSESS-DOCD LE1/YR: CPT | Mod: CPTII,S$GLB,, | Performed by: FAMILY MEDICINE

## 2024-11-06 PROCEDURE — 1126F AMNT PAIN NOTED NONE PRSNT: CPT | Mod: CPTII,S$GLB,, | Performed by: FAMILY MEDICINE

## 2024-11-06 PROCEDURE — 1159F MED LIST DOCD IN RCRD: CPT | Mod: CPTII,S$GLB,, | Performed by: FAMILY MEDICINE

## 2024-11-06 PROCEDURE — 3288F FALL RISK ASSESSMENT DOCD: CPT | Mod: CPTII,S$GLB,, | Performed by: FAMILY MEDICINE

## 2024-11-06 RX ORDER — MUPIROCIN 20 MG/G
OINTMENT TOPICAL 3 TIMES DAILY
Qty: 22 G | Refills: 1 | Status: SHIPPED | OUTPATIENT
Start: 2024-11-06 | End: 2024-11-06

## 2024-11-06 RX ORDER — DABIGATRAN ETEXILATE 150 MG/1
150 CAPSULE ORAL 2 TIMES DAILY
Qty: 180 CAPSULE | Refills: 3 | Status: SHIPPED | OUTPATIENT
Start: 2024-11-06

## 2024-11-06 RX ORDER — DABIGATRAN ETEXILATE MESYLATE 150 MG/1
150 CAPSULE ORAL 2 TIMES DAILY
Qty: 180 CAPSULE | Refills: 0 | Status: CANCELLED | OUTPATIENT
Start: 2024-11-06

## 2024-11-06 NOTE — PROGRESS NOTES
HPI:  74-year-old white male in for 2 week follow-up and medication refills--pt still replacing floor --had some soft spots--good to get to the rotten wood---fell through the floor for the 2nd time --this time with the left leg --went to urgent care -=-had abrasion of the left knee--abrasion of the elbow and small fx thumb --had very small fx pt states hard time seeing it --in spicca splint .  On Bactrim   Here for right leg follow up--swelling appears be going down on AB --elevating lef    Hypertension blood pressure 136/86 on metoprolol   Hyperlipidemia no longer on cholesterol medication   Peripheral edema has not had to take Lasix   Atrial fib on Pradaxa and metoprolol   Sciatica doing well tramadol   History varicose vein surgery doing well   Sees Dr. Tang cardiology        ROS:  No significant change except for history of present illness  Skin: no psoriasis, eczema, skin cancer--abrasion upper right lower leg elliptical proximally 6 x 1 cm abrasion  HEENT: no HA  , no ocular pain, blurred vision, diplopia, epistaxis, hoarseness change in voice, + hypothyroidism  Lung: No pneumonia, asthma, Tb, wheezing, SOB, no smoking  Heart: No chest pain, ankle edema, palpitations, MI, barby murmur,+ hypertension,+  Hyperlipidemia--no stent bypass arrhythmia + atrial fibrillation   Abdomen: No nausea, vomiting, diarrhea, constipation, ulcers, hepatitis, gallbladder disease, melena, hematochezia, hematemesis  : no renal disease, stones --hx UTI last visit   MS: no fractures, swelling of the right thigh--laterally--edematous area possible slight hematoma under the skin 20 x 7 cm--slightly tender to touch---tenderness in the lumbar spine bilaterally right greater than left---left knee also tender slight crepitus with flexion and extension--able to walk but with a limp tends to favor right leg more than left  Neuro: no dizziness--, no LOC, seizures   No diabetes, no anemia, no anxiety, no n depression prefers no  medication tries to get over things on her own  , 2 children, lives with  --not working times 41 years  Objective:   Physical Exam:    General: Well nourished, well developed, no acute distress + overweight  Skin: Hematoma right thigh much better-- swelling right lower leg much better   HEENT: Eyes PERRLA, EOM intact, nose clear  throat nonerythematous ears TMs clear  NECK: Supple, no bruits, No JVD, no nodes  Lungs: Clear, no rales, rhonchi, wheezing   Heart: Regular rate and rhythm, no murmurs, gallops, or rubs  Abdomen: flat, bowel sounds positive, no tenderness, or organomegaly  MS:  Recent fall through the floor---swelling right lateral thigh 20 x 7 cm--appears to be either edema or hematoma but no ecchymosis noted no erythema area is slightly hard and tender--tenderness lumbar spine L1-S1 anterior flexion 10° extension 10° lateral flexion rotation 10°--left knee some slight crepitus with flexion extension overall good range of motion muscle strength--walks with a limp favoring more of the right leg---no significant musculoskeletal changes--abrasion left knee and left inner elbow --swelling of thumb   Neuro: Alert, CN intact, oriented X 3 Romberg negative heel-toe intact  Extremities: No cyanosis, clubbing, or edema         Assessment:       Fracture left thumb  Abrasion left knee  Abrasion left elbow  Abrasion right lower leg  Hematoma right thigh  Deep vein thrombosis right leg    Plan:             Main Reason here-  Patient--fell through the floor a 2nd time---2 days ago---fractured left thumb--in spica splint--will re-x-ray in 2 weeks---abrasion left lower leg--abrasion left elbow  First fall--also through the floor---hematoma right thigh markedly improved--abrasion right lower leg with some erythema--edema of the left leg is much improved--ultrasound showed nonocclusive thrombus--may be old--in 2 vessels did not show up on ultrasound that may be occluded or very small will get vascular  surgeon to look at it--patient on Pradaxa  X-ray right knee--shows osteoarthritis with some calcification may need ultrasound of the arteries of the lower legs in the future-  X-ray lumbar spine--anterior listhesis L4-5--severe facet arthropathy L4-5 L5-S1--significant spurring of the vertebra with decreased disc space---if develops back pain needs MRI of the lumbar spine  Other medical issues  Hx of Fall through the floor in his house--was doing repairs on the floor--area was rotten--wife moved the rug---patient fell through with right lower leg---patient landed on the ground with the right lower leg--left leg was hyperextended to the left as it was caught by the floor--seen by urgent care---abrasion leg cleaned and dressed---tetanus shot.  Patient awoke with pain in the right thigh--noted 20 x 7 cm area of swelling which appears to be a mixture of edema and possible hematoma but no ecchymosis or erythema this is a lateral thigh---now tenderness in the left knee---also pain in the lower back---and right thigh--will x-ray lumbar spine left knee right thigh and femur---Ultram--no NSAIDs due to blood thinners--can use Tylenol--ret 6 weeks   Other medical issues  Hyper lipidemia-- lipids within normal limits patient  off of atorvastatin-- will probably remove this diagnosis next visit if still normal  Hypertension metoprolol   100/62   Peripheral edema on Lasix-- has not had to take recently  History lumbosacral strain--had nerve block that lasted 2 years--going to [facility] needs some Ultram for trip to see neurosurgeon for possible epidural steroid injection again   Osteoarthritis both knees patient Needs handicap license only able walk sees Dr. Ray who injects patient's knees did discuss a knee replacement    Thrombocytopenia platelets 129   Hyperbilirubinemia bilirubin 1.1   bilateral osteoarthritis knees --x-ray both knees--see DR Verdugo --?  Inject both knees--discuss knee replacement  Lab done by   Tang  Health maintenance colonoscopy  Six months refills all medications

## 2024-11-07 ENCOUNTER — TELEPHONE (OUTPATIENT)
Dept: PRIMARY CARE CLINIC | Facility: CLINIC | Age: 74
End: 2024-11-07
Payer: MEDICARE

## 2024-11-07 NOTE — TELEPHONE ENCOUNTER
----- Message from Driss Bradford MD sent at 11/5/2024  8:36 PM CST -----  Call tell pt US lower extremity---Nonocclusive thrombus right mid femoral vein   Nonvisualization right anterior tibial vein or posterior tibial vein may be small may be occluded ---Ask pt if OK with him can do e visit with vascualr surgeon to evaluate findings See waht they suggest as long term TX

## 2024-11-07 NOTE — TELEPHONE ENCOUNTER
Spoke with pt regarding all of his results , melva dorman . He was seen in office on yesterday and says he is in contact with his prior vascular surgeon

## 2024-11-08 NOTE — TELEPHONE ENCOUNTER
No care due was identified.  [facility] Embedded Care Due Messages. Reference number: [id number].   11/08/2024 7:51:53 AM CST

## 2024-11-08 NOTE — TELEPHONE ENCOUNTER
----- Message from Sosa sent at 11/7/2024  4:04 PM CST -----  Contact: [phone]  Requesting an RX refill or new RX.    Is this a refill or new RX: refill    RX name and strength (copy/paste from chart):  celecoxib (CELEBREX) 100 MG capsule     Is this a 30 day or 90 day RX: 90    Pharmacy name and phone # (copy/paste from chart):    [facility] - [address] [address]  Phone: [phone] Fax: [phone]    The doctors have asked that we provide their patients with the following 2 reminders -- prescription refills can take up to 72 hours, and a friendly reminder that in the future you can use your [facility] account to request refills: yes

## 2024-11-09 RX ORDER — CELECOXIB 100 MG/1
100 CAPSULE ORAL DAILY
Qty: 90 CAPSULE | Refills: 3 | Status: SHIPPED | OUTPATIENT
Start: 2024-11-09

## 2024-12-02 ENCOUNTER — OFFICE VISIT (OUTPATIENT)
Dept: PRIMARY CARE CLINIC | Facility: CLINIC | Age: 74
End: 2024-12-02
Payer: MEDICARE

## 2024-12-02 VITALS
SYSTOLIC BLOOD PRESSURE: 114 MMHG | DIASTOLIC BLOOD PRESSURE: 70 MMHG | RESPIRATION RATE: 18 BRPM | OXYGEN SATURATION: 96 % | WEIGHT: 315 LBS | HEART RATE: 100 BPM | HEIGHT: 75 IN | BODY MASS INDEX: 39.17 KG/M2

## 2024-12-02 DIAGNOSIS — Z86.79 HISTORY OF ATRIAL FIBRILLATION: ICD-10-CM

## 2024-12-02 DIAGNOSIS — S80.01XD TRAUMATIC HEMATOMA OF RIGHT KNEE, SUBSEQUENT ENCOUNTER: ICD-10-CM

## 2024-12-02 DIAGNOSIS — I10 HYPERTENSION, UNSPECIFIED TYPE: ICD-10-CM

## 2024-12-02 DIAGNOSIS — G47.33 OSA (OBSTRUCTIVE SLEEP APNEA): ICD-10-CM

## 2024-12-02 DIAGNOSIS — E66.01 SEVERE OBESITY (BMI 35.0-39.9) WITH COMORBIDITY: ICD-10-CM

## 2024-12-02 DIAGNOSIS — S46.911A STRAIN OF RIGHT SHOULDER, INITIAL ENCOUNTER: Primary | ICD-10-CM

## 2024-12-02 PROBLEM — S80.01XA TRAUMATIC HEMATOMA OF RIGHT KNEE: Status: ACTIVE | Noted: 2024-12-02

## 2024-12-02 PROCEDURE — 99214 OFFICE O/P EST MOD 30 MIN: CPT | Mod: 25,S$GLB,, | Performed by: FAMILY MEDICINE

## 2024-12-02 PROCEDURE — 1159F MED LIST DOCD IN RCRD: CPT | Mod: CPTII,S$GLB,, | Performed by: FAMILY MEDICINE

## 2024-12-02 PROCEDURE — 3074F SYST BP LT 130 MM HG: CPT | Mod: CPTII,S$GLB,, | Performed by: FAMILY MEDICINE

## 2024-12-02 PROCEDURE — 1101F PT FALLS ASSESS-DOCD LE1/YR: CPT | Mod: CPTII,S$GLB,, | Performed by: FAMILY MEDICINE

## 2024-12-02 PROCEDURE — 96372 THER/PROPH/DIAG INJ SC/IM: CPT | Mod: S$GLB,,, | Performed by: FAMILY MEDICINE

## 2024-12-02 PROCEDURE — 3078F DIAST BP <80 MM HG: CPT | Mod: CPTII,S$GLB,, | Performed by: FAMILY MEDICINE

## 2024-12-02 PROCEDURE — 99999 PR PBB SHADOW E&M-EST. PATIENT-LVL IV: CPT | Mod: PBBFAC,,, | Performed by: FAMILY MEDICINE

## 2024-12-02 PROCEDURE — 1126F AMNT PAIN NOTED NONE PRSNT: CPT | Mod: CPTII,S$GLB,, | Performed by: FAMILY MEDICINE

## 2024-12-02 PROCEDURE — 3288F FALL RISK ASSESSMENT DOCD: CPT | Mod: CPTII,S$GLB,, | Performed by: FAMILY MEDICINE

## 2024-12-02 RX ORDER — HYDROCODONE BITARTRATE AND ACETAMINOPHEN 5; 325 MG/1; MG/1
1 TABLET ORAL EVERY 6 HOURS PRN
Qty: 30 TABLET | Refills: 0 | Status: SHIPPED | OUTPATIENT
Start: 2024-12-02

## 2024-12-02 RX ORDER — METHOCARBAMOL 500 MG/1
500 TABLET, FILM COATED ORAL 4 TIMES DAILY
Qty: 40 TABLET | Refills: 0 | Status: SHIPPED | OUTPATIENT
Start: 2024-12-02 | End: 2024-12-12

## 2024-12-02 RX ORDER — TRIAMCINOLONE ACETONIDE 40 MG/ML
40 INJECTION, SUSPENSION INTRA-ARTICULAR; INTRAMUSCULAR ONCE
Status: COMPLETED | OUTPATIENT
Start: 2024-12-02 | End: 2024-12-02

## 2024-12-02 RX ORDER — PREDNISONE 20 MG/1
20 TABLET ORAL DAILY
Qty: 10 TABLET | Refills: 0 | Status: SHIPPED | OUTPATIENT
Start: 2024-12-02

## 2024-12-02 RX ADMIN — TRIAMCINOLONE ACETONIDE 40 MG: 40 INJECTION, SUSPENSION INTRA-ARTICULAR; INTRAMUSCULAR at 11:12

## 2024-12-02 NOTE — PROGRESS NOTES
HPI 73 yo WM in forbn --right shoulder blade --for the last few weeks has intermittent stabbing pain--last night was miserable --in continue--took tramadol about 1:00 a.m.---in 2nd pill around 3:30 am  Pain still 4-5/10.  No trauma no excessive activity---no lupus rheumatoid gout--history of a fracture left thumb few weeks ago due to a fall.  Step through the floor--had 2 episodes of falling through the floor about 3 weeks ago--had abrasion of the left knee/abrasion--left elbow--  Patient feels like when he breathes has some discomfort in the right shoulder blade---pain also if tries to raise right arm overhead.   Hypertension blood pressure 114/70 on metoprolol   Hyperlipidemia no longer on cholesterol medication   Peripheral edema has not had to take Lasix   Atrial fib on Pradaxa and metoprolol   Sciatica doing well tramadol   History varicose vein surgery doing well   Sees Dr. Tang cardiology--patient changes insurance so will see a different cardiologist in January.  Had stress test about 1 year ago told was fine        ROS:    Skin: no psoriasis, eczema, skin cancer--abrasion upper right lower leg elliptical proximally 6 x 1 cm abrasion  HEENT: no HA  , no ocular pain, blurred vision, diplopia, epistaxis, hoarseness change in voice, + hypothyroidism  Lung: No pneumonia, asthma, Tb, wheezing, SOB, no smoking  Heart: No chest pain, ankle edema, palpitations, MI, barby murmur,+ hypertension,+  Hyperlipidemia--no stent bypass arrhythmia + atrial fibrillation   Abdomen: No nausea, vomiting, diarrhea, constipation, ulcers, hepatitis, gallbladder disease, melena, hematochezia, hematemesis  : no renal disease, stones --hx UTI last visit   MS: no fractures, swelling of the right thigh--laterally--edematous area possible slight hematoma under the skin 20 x 7 cm--slightly tender to touch---tenderness in the lumbar spine bilaterally right greater than left---left knee also tender slight crepitus with flexion and  extension--able to walk but with a limp tends to favor right leg more than left  Neuro: no dizziness--, no LOC, seizures   No diabetes, no anemia, no anxiety, no n depression prefers no medication tries to get over things on her own  , 2 children, lives with  --not working times 41 years  Objective:   Physical Exam:    General: Well nourished, well developed, no acute distress + overweight  Skin: Hematoma right thigh much better-- swelling right lower leg much better   HEENT: Eyes PERRLA, EOM intact, nose clear  throat nonerythematous ears TMs clear  NECK: Supple, no bruits, No JVD, no nodes  Lungs: Clear, no rales, rhonchi, wheezing   Heart: Regular rate and rhythm, no murmurs, gallops, or rubs  Abdomen: flat, bowel sounds positive, no tenderness, or organomegaly  MS:  Recent fall through the floor--x 2 3 weeks go--had contusion left elbow left lower leg fracture of the left thumb----now hematoma right lower leg 8 x 7.5 cm soft well defined nonerythematous nontender--patient with bilateral peripheral edema of the lower leg  Main problem today shoulder pain---pain with anterior posterior flexion of the shoulders in the midscapular area rhomboids and levator scapula muscle---pain with abduction of the arm to 90° and increases as raises right arm overhead---pain with palpation of the right scapula with mild some pain with rotation of the shoulder-good hand grasps good opposition thumb index thumb 5th digit good flexion extension forearms  Neuro: Alert, CN intact, oriented X 3 Romberg negative heel-toe intact  Extremities: No cyanosis, clubbing, or edema         Assessment:       Right shoulder strain  Right lower leg hematoma infrapatellar area  History fracture left thumb--history abrasion left elbow-history abrasion left knee  Hypertension  History atrial fibrillation  Thrombocytopenia  Obstructive sleep apnea  Bilateral lower leg edema  History deep vein thrombosis  Severe obesity  History lumbar  radiculopathy memory loss vertigo  Plan:             Main Reason here-  History to falls through the floor--proximally 3 weeks ago---abrasion left elbow-abrasion left knee-fracture left thumb--on tramadol no relief --Norco for breakthrough pain---Kenalog 40 mg IM now---Robaxin 500 mg q.h.s. for muscle spasm and sleep can increase to q.8 hours if needed but may make sleepy---prednisone 20 mg 1 p.o. q.d. times 10 days start Tuesday a.m.---due to being on blood thinners--no NSAIDs--patient return in 2 weeks if not better will consider CT scan of the chest---for completeness EKGs/chest x-ray/x-ray of the right shoulder--EKG atrial fibrillation with controlled response of 77 right bundle-branch block--pain appears musculoskeletal not cardiac --exacerbated with palpation anterior posterior flexion of the shoulders and raising right arm overhead Moist heat/theragesic or Tiger balm/range of motion exercise  Hematoma right lower leg just below the knee 8 x 7.5 cm well defined no erythema no tenderness  Right shoulder pain--main problem appears to have tenderness in the medial scapular border area of the rhomboid major and minor muscles and levator scapula muscle=--=-pain with anterior posterior flexion of the shoulders pain with raising right arm overhead  X-ray lumbar spine--anterior listhesis L4-5--severe facet arthropathy L4-5 L5-S1--significant spurring of the vertebra with decreased disc space---if develops back pain needs MRI of the lumbar spine  Hyper lipidemia-- lipids within normal limits patient  off of atorvastatin-- will probably remove this diagnosis next visit if still normal  Hypertension metoprolol   114/70  Peripheral edema on Lasix-- has not had to take recently  History lumbosacral strain--had nerve block that lasted 2 years--going to [place] needs some Ultram for trip to see neurosurgeon for possible epidural steroid injection again   Osteoarthritis both knees patient Needs handicap license only able  walk sees Dr. Ray who injects patient's knees did discuss a knee replacement    Thrombocytopenia platelets 129   Hyperbilirubinemia bilirubin 1.1   bilateral osteoarthritis knees --x-ray both knees--see DR Verdugo --?  Inject both knees--discuss knee replacement  Lab done by Dr. Tang--patient states had a normal stress test 1 year ago--changing insurance so has to get a new cardiologist

## 2024-12-02 NOTE — PROGRESS NOTES
Verified pt ID using name and . NKDA. Administered kenalog 40 in right VG per physician order using aseptic technique. Aspirated and no blood return noted. Pt tolerated well with no adverse reactions noted.

## 2024-12-02 NOTE — PATIENT INSTRUCTIONS
Right shoulder strain\]  Moist heat/theragesic or Tiger balm/range of motion exercise  X-ray right shoulder  Chest x-ray--rule out pleurisy  EKGs showed atrial fib with controlled response right bundle-branch block  Norco 1 p.o. q.6 hours p.r.n. breakthrough pain do not take with tramadol  Kenalog 40 mg IM now  Prednisone 20 mg 1 p.o. q.d. times 10 days start Tuesday a.m.  No NSAIDs due to being on blood thinners  Return 2 weeks if not better will consider sending the orthopedist in have possible CT scan of the chest  Hematoma right lower leg 8 x 7.5 cm  Well-defined nonerythematous nontender Moist heat--will recheck in 2 weeks ---will probably take 6 weeks to 3 months to resolve--if increases in size will get surgical consult but doubt I&D necessary

## 2024-12-10 ENCOUNTER — PATIENT MESSAGE (OUTPATIENT)
Dept: PRIMARY CARE CLINIC | Facility: CLINIC | Age: 74
End: 2024-12-10
Payer: MEDICARE

## 2025-02-24 DIAGNOSIS — Z00.00 ENCOUNTER FOR MEDICARE ANNUAL WELLNESS EXAM: ICD-10-CM

## 2025-05-19 ENCOUNTER — TELEPHONE (OUTPATIENT)
Dept: PRIMARY CARE CLINIC | Facility: CLINIC | Age: 75
End: 2025-05-19
Payer: MEDICARE

## 2025-05-19 NOTE — TELEPHONE ENCOUNTER
Pt called for a med refill. Pt offered a virtual visit. Pt refused virtual visit. Pt scheduled an appt for 05/20/2025 at 0920.

## 2025-05-19 NOTE — TELEPHONE ENCOUNTER
----- Message from Vinicio sent at 5/19/2025 12:45 PM CDT -----  Contact: [phone]@patient  Type: Returning a callWho left a message? Pt When did the practice call?Does patient know what this is regarding:Would the patient rather a call back or a response via [facility]? Call back Comments:Pt would like a call back to discuss getting a referral. Pt did not want me to put in for a referral he wants a call back from the office.

## 2025-05-20 ENCOUNTER — OFFICE VISIT (OUTPATIENT)
Dept: PRIMARY CARE CLINIC | Facility: CLINIC | Age: 75
End: 2025-05-20
Payer: MEDICARE

## 2025-05-20 VITALS
SYSTOLIC BLOOD PRESSURE: 122 MMHG | DIASTOLIC BLOOD PRESSURE: 70 MMHG | BODY MASS INDEX: 39.17 KG/M2 | HEIGHT: 75 IN | HEART RATE: 87 BPM | WEIGHT: 315 LBS | OXYGEN SATURATION: 99 % | RESPIRATION RATE: 18 BRPM

## 2025-05-20 DIAGNOSIS — G47.33 OSA (OBSTRUCTIVE SLEEP APNEA): ICD-10-CM

## 2025-05-20 DIAGNOSIS — M15.0 PRIMARY OSTEOARTHRITIS INVOLVING MULTIPLE JOINTS: Primary | ICD-10-CM

## 2025-05-20 DIAGNOSIS — Z86.79 HISTORY OF ATRIAL FIBRILLATION: ICD-10-CM

## 2025-05-20 DIAGNOSIS — R07.89 ATYPICAL CHEST PAIN: ICD-10-CM

## 2025-05-20 DIAGNOSIS — I10 HYPERTENSION, UNSPECIFIED TYPE: ICD-10-CM

## 2025-05-20 DIAGNOSIS — E66.01 SEVERE OBESITY (BMI 35.0-39.9) WITH COMORBIDITY: ICD-10-CM

## 2025-05-20 PROBLEM — L98.9 SKIN LESION: Status: RESOLVED | Noted: 2021-09-29 | Resolved: 2025-05-20

## 2025-05-20 PROBLEM — L98.9 SKIN LESION OF RIGHT LEG: Status: RESOLVED | Noted: 2021-07-28 | Resolved: 2025-05-20

## 2025-05-20 PROBLEM — S80.01XA TRAUMATIC HEMATOMA OF RIGHT KNEE: Status: RESOLVED | Noted: 2024-12-02 | Resolved: 2025-05-20

## 2025-05-20 PROBLEM — R60.0 UNILATERAL EDEMA OF LOWER EXTREMITY: Status: RESOLVED | Noted: 2019-05-15 | Resolved: 2025-05-20

## 2025-05-20 PROBLEM — M15.9 OSTEOARTHRITIS OF MULTIPLE JOINTS: Status: ACTIVE | Noted: 2017-10-19

## 2025-05-20 PROBLEM — R42 VERTIGO: Status: RESOLVED | Noted: 2021-08-18 | Resolved: 2025-05-20

## 2025-05-20 PROCEDURE — 1101F PT FALLS ASSESS-DOCD LE1/YR: CPT | Mod: CPTII,S$GLB,, | Performed by: FAMILY MEDICINE

## 2025-05-20 PROCEDURE — 3288F FALL RISK ASSESSMENT DOCD: CPT | Mod: CPTII,S$GLB,, | Performed by: FAMILY MEDICINE

## 2025-05-20 PROCEDURE — 99214 OFFICE O/P EST MOD 30 MIN: CPT | Mod: S$GLB,,, | Performed by: FAMILY MEDICINE

## 2025-05-20 PROCEDURE — 1159F MED LIST DOCD IN RCRD: CPT | Mod: CPTII,S$GLB,, | Performed by: FAMILY MEDICINE

## 2025-05-20 PROCEDURE — 3078F DIAST BP <80 MM HG: CPT | Mod: CPTII,S$GLB,, | Performed by: FAMILY MEDICINE

## 2025-05-20 PROCEDURE — 1126F AMNT PAIN NOTED NONE PRSNT: CPT | Mod: CPTII,S$GLB,, | Performed by: FAMILY MEDICINE

## 2025-05-20 PROCEDURE — 99999 PR PBB SHADOW E&M-EST. PATIENT-LVL III: CPT | Mod: PBBFAC,,, | Performed by: FAMILY MEDICINE

## 2025-05-20 PROCEDURE — 3074F SYST BP LT 130 MM HG: CPT | Mod: CPTII,S$GLB,, | Performed by: FAMILY MEDICINE

## 2025-05-20 RX ORDER — METOPROLOL SUCCINATE 25 MG/1
TABLET, EXTENDED RELEASE ORAL
Qty: 270 TABLET | Refills: 3 | Status: SHIPPED | OUTPATIENT
Start: 2025-05-20

## 2025-05-20 RX ORDER — CELECOXIB 200 MG/1
200 CAPSULE ORAL 2 TIMES DAILY
Qty: 180 CAPSULE | Refills: 3 | Status: SHIPPED | OUTPATIENT
Start: 2025-05-20

## 2025-05-20 RX ORDER — METOPROLOL SUCCINATE 25 MG/1
TABLET, EXTENDED RELEASE ORAL
Qty: 180 TABLET | Refills: 2 | Status: SHIPPED | OUTPATIENT
Start: 2025-05-20

## 2025-05-20 NOTE — PROGRESS NOTES
HPI 75 yo WM in for  refills --patient was going to [facility] for medical care--has to change physicians due to insurance---needs refills of heart medication--used Dr Nguyen    Hypertension blood pressure 122/70  on metoprolol   Hyperlipidemia --no chols=esterol medication    Peripheral edema has not had to take Lasix   Atrial fib on Pradaxa and metoprolol   Sciatica doing well tramadol   History varicose vein surgery doing well   Sees Dr. Tang cardiology--patient changes insurance so will see a different cardiologist in January.  Had stress test about 1 year ago told was fine        ROS:    Skin: no psoriasis, eczema, skin cancer--  HEENT: no HA  , no ocular pain, blurred vision, diplopia, epistaxis, hoarseness change in voice, + hypothyroidism  Lung: No pneumonia, asthma, Tb, wheezing, SOB, no smoking  Heart: No chest pain, ankle edema, palpitations, MI, barby murmur,+ hypertension,+  Hyperlipidemia--no stent bypass arrhythmia + atrial fibrillation   Abdomen: No nausea, vomiting, diarrhea, constipation, ulcers, hepatitis, gallbladder disease, melena, hematochezia, hematemesis  : no renal disease, stones --no recent UTI   MS --arthritis multiple joints but especially left hand patient on Celebrex  Neuro: no dizziness--, no LOC, seizures   No diabetes, no anemia, no anxiety, no n depression prefers no medication tries to get over things on her own  , 2 children, lives with  --not working times 41 years  Objective:   Physical Exam:    General: Well nourished, well developed, no acute distress + overweight  Skin: Hematoma right thigh much better-- swelling right lower leg much better   HEENT: Eyes PERRLA, EOM intact, nose clear  throat nonerythematous ears TMs clear  NECK: Supple, no bruits, No JVD, no nodes  Lungs: Clear, no rales, rhonchi, wheezing   Heart: Regular rate and rhythm, no murmurs, gallops, or rubs  Abdomen: flat, bowel sounds positive, no tenderness, or organomegaly  MS:   Recent fall through the floor--x 2 3 weeks go--had contusion left elbow left lower leg fracture of the left thumb----now hematoma right lower leg 8 x 7.5 cm soft well defined nonerythematous nontender--patient with bilateral peripheral edema of the lower leg  Main problem today shoulder pain---pain with anterior posterior flexion of the shoulders in the midscapular area rhomboids and levator scapula muscle---pain with abduction of the arm to 90° and increases as raises right arm overhead---pain with palpation of the right scapula with mild some pain with rotation of the shoulder-good hand grasps good opposition thumb index thumb 5th digit good flexion extension forearms  Neuro: Alert, CN intact, oriented X 3 Romberg negative heel-toe intact  Extremities: No cyanosis, clubbing, or edema         Assessment:         Hypertension  History atrial fibrillation  Thrombocytopenia  Obstructive sleep apnea  Bilateral lower leg edema  History deep vein thrombosis  Severe obesit  Plan:             Main Reason here-  Patient having to which cardiologist due to insurance went to [facility] Dr. Tang needs refills of heart medication especially metoprolol 25 mg 2 in the morning 1 in the afternoon  Hematoma right lower leg resolved  Right shoulder pain--resolved  Still having significant arthritis in the left hand osteoarthritis multiple joints but especially left hand increase Celebrex from 100 mg to 200 mg twice a day stop develops stomach pain due to being on blood thinners  X-ray lumbar spine--anterior listhesis L4-5--severe facet arthropathy L4-5 L5-S1--significant spurring of the vertebra with decreased disc space---if develops back pain needs MRI of the lumbar spine  Hypertension metoprolol   122/70  Peripheral edema much improved  Thrombocytopenia Plt 129    Hyperbilirubinemia bilirubin 1.1  Lab CBC CMP Lipid

## 2025-05-23 ENCOUNTER — NURSE TRIAGE (OUTPATIENT)
Dept: ADMINISTRATIVE | Facility: CLINIC | Age: 75
End: 2025-05-23
Payer: MEDICARE

## 2025-05-23 NOTE — TELEPHONE ENCOUNTER
"Patient states he has a "kidney issue" x 1-2 years. Patient states he has had several visits to the ED to address his concerns and visited an Urgent Care Center on today that sent him to the ED. Patient is calling for the Wait Times at various [facility] Fac[facility].     Patient provided the [facility] Wait Times for several [facility] locations. Patient also advised to contact the [facility] Call Service for any worsening symptoms. Patient states understanding of care advice.     Reason for Disposition   General information question, no triage required and triager able to answer question    Additional Information   Negative: [1] Caller is not with the adult (patient) AND [2] reporting urgent symptoms   Negative: Lab result questions   Negative: Medication questions   Negative: Caller can't be reached by phone   Negative: Caller has already spoken to PCP (doctor or NP/PA) or another triager   Negative: Triager needs further essential information from caller in order to complete triage   Negative: [1] Caller requesting NON-URGENT health information AND [2] PCP's office is the best resource   Negative: Requesting regular office appointment   Negative: Health information question, no triage required and triager able to answer question    Protocols used: Information Only Call - No Triage-A-    "

## 2025-05-26 ENCOUNTER — RESULTS FOLLOW-UP (OUTPATIENT)
Dept: PRIMARY CARE CLINIC | Facility: CLINIC | Age: 75
End: 2025-05-26
Payer: MEDICARE

## 2025-05-26 ENCOUNTER — TELEPHONE (OUTPATIENT)
Dept: PRIMARY CARE CLINIC | Facility: CLINIC | Age: 75
End: 2025-05-26
Payer: MEDICARE

## 2025-05-26 DIAGNOSIS — R10.12 LEFT UPPER QUADRANT ABDOMINAL PAIN: Primary | ICD-10-CM

## 2025-05-26 NOTE — TELEPHONE ENCOUNTER
Patient is complaining of excruciating left flank pain since Friday.  Urgent care urine test showed WBC's and trace blood.  He is requesting a CT scan and a prescription for Tramadol but it's not on his med list.  I only see Norco.  CT scan pended but not the Tramadol.

## 2025-05-27 ENCOUNTER — TELEPHONE (OUTPATIENT)
Dept: PRIMARY CARE CLINIC | Facility: CLINIC | Age: 75
End: 2025-05-27
Payer: MEDICARE

## 2025-05-27 DIAGNOSIS — R10.12 LEFT UPPER QUADRANT ABDOMINAL PAIN: Primary | ICD-10-CM

## 2025-05-27 DIAGNOSIS — N28.89 KIDNEY MASS: ICD-10-CM

## 2025-05-27 NOTE — TELEPHONE ENCOUNTER
----- Message from Helena sent at 5/27/2025  8:58 AM CDT -----  Contact: [phone]  2TESTRESULTSType: Test ResultsWhat test was performed? CT Who ordered the test? Dr Bradford When and where were the test performed? 05/26 at [facility] Would you like a call back and or thru [facility]:call back Comments:

## 2025-05-27 NOTE — TELEPHONE ENCOUNTER
----- Message from Driss Bradford MD sent at 5/26/2025 11:38 PM CDT -----  Callntell pt 4 cm mass right kidney --possible complex renal cyst or nepoplasm Needs see urologist Needs Renal mass CT scan or MRI abd --Will put in urology consult once we know who will see pt need   sent them a mesage to review cT scan and see which neww test needs be done Renal mass CT scan or MRI abd and see if they can see pt soon  ----- Message -----  From: Interface, Rad Results In  Sent: 5/26/2025   7:47 PM CDT  To: Driss Bradford MD

## 2025-05-28 ENCOUNTER — OFFICE VISIT (OUTPATIENT)
Dept: UROLOGY | Facility: CLINIC | Age: 75
End: 2025-05-28
Payer: MEDICARE

## 2025-05-28 ENCOUNTER — LAB VISIT (OUTPATIENT)
Dept: LAB | Facility: HOSPITAL | Age: 75
End: 2025-05-28
Payer: MEDICARE

## 2025-05-28 VITALS
WEIGHT: 310.19 LBS | HEIGHT: 75 IN | SYSTOLIC BLOOD PRESSURE: 134 MMHG | BODY MASS INDEX: 38.57 KG/M2 | DIASTOLIC BLOOD PRESSURE: 90 MMHG | HEART RATE: 88 BPM

## 2025-05-28 DIAGNOSIS — R10.12 LEFT UPPER QUADRANT ABDOMINAL PAIN: ICD-10-CM

## 2025-05-28 DIAGNOSIS — N28.89 KIDNEY MASS: Primary | ICD-10-CM

## 2025-05-28 DIAGNOSIS — N28.89 OTHER SPECIFIED DISORDERS OF KIDNEY AND URETER: ICD-10-CM

## 2025-05-28 DIAGNOSIS — N28.89 KIDNEY MASS: ICD-10-CM

## 2025-05-28 LAB
ANION GAP (OHS): 9 MMOL/L (ref 8–16)
BILIRUBIN, UA POC OHS: NEGATIVE
BLOOD, UA POC OHS: NEGATIVE
BUN SERPL-MCNC: 14 MG/DL (ref 8–23)
CALCIUM SERPL-MCNC: 9 MG/DL (ref 8.7–10.5)
CHLORIDE SERPL-SCNC: 106 MMOL/L (ref 95–110)
CLARITY, UA POC OHS: CLEAR
CO2 SERPL-SCNC: 23 MMOL/L (ref 23–29)
COLOR, UA POC OHS: YELLOW
CREAT SERPL-MCNC: 0.9 MG/DL (ref 0.5–1.4)
GFR SERPLBLD CREATININE-BSD FMLA CKD-EPI: >60 ML/MIN/1.73/M2
GLUCOSE SERPL-MCNC: 82 MG/DL (ref 70–110)
GLUCOSE, UA POC OHS: NEGATIVE
KETONES, UA POC OHS: NEGATIVE
LEUKOCYTES, UA POC OHS: NEGATIVE
NITRITE, UA POC OHS: NEGATIVE
PH, UA POC OHS: 7
POTASSIUM SERPL-SCNC: 4.5 MMOL/L (ref 3.5–5.1)
PROTEIN, UA POC OHS: NEGATIVE
SODIUM SERPL-SCNC: 138 MMOL/L (ref 136–145)
SPECIFIC GRAVITY, UA POC OHS: 1.01
UROBILINOGEN, UA POC OHS: 1

## 2025-05-28 PROCEDURE — 1101F PT FALLS ASSESS-DOCD LE1/YR: CPT | Mod: CPTII,S$GLB,,

## 2025-05-28 PROCEDURE — 36415 COLL VENOUS BLD VENIPUNCTURE: CPT

## 2025-05-28 PROCEDURE — 99214 OFFICE O/P EST MOD 30 MIN: CPT | Mod: S$GLB,,,

## 2025-05-28 PROCEDURE — 3075F SYST BP GE 130 - 139MM HG: CPT | Mod: CPTII,S$GLB,,

## 2025-05-28 PROCEDURE — 3288F FALL RISK ASSESSMENT DOCD: CPT | Mod: CPTII,S$GLB,,

## 2025-05-28 PROCEDURE — 1159F MED LIST DOCD IN RCRD: CPT | Mod: CPTII,S$GLB,,

## 2025-05-28 PROCEDURE — 3080F DIAST BP >= 90 MM HG: CPT | Mod: CPTII,S$GLB,,

## 2025-05-28 PROCEDURE — 1125F AMNT PAIN NOTED PAIN PRSNT: CPT | Mod: CPTII,S$GLB,,

## 2025-05-28 PROCEDURE — 81003 URINALYSIS AUTO W/O SCOPE: CPT | Mod: QW,S$GLB,,

## 2025-05-28 PROCEDURE — 99999 PR PBB SHADOW E&M-EST. PATIENT-LVL IV: CPT | Mod: PBBFAC,,,

## 2025-05-28 RX ORDER — NALOXONE HYDROCHLORIDE 4 MG/.1ML
SPRAY NASAL
COMMUNITY
Start: 2025-05-27

## 2025-05-28 NOTE — PROGRESS NOTES
[facility]  Urology Clinic Note    Date of Service: 05/28/2025     CHIEF COMPLAINT: Renal Cyst     History of Present Illness:   Shon Asencio is a 74 y.o. male who presents to today for right renal cyst. He is established to our clinic but new to me. He was last seen by Dr. Jimenez on 2/8/24 for prostate exam.   PSA 10/2023 was 3.4. Has never been elevated. No issues with voiding. No gross hematuria or dysuria. Upon exam prostate was 20-30 g with no nodules.     On 5/26/25 He presented to urgent care and was sent to ER. Patient stated he is having excruciating pain in his left flank area since Friday.  The urine at urgent care showed WBC and trace blood.  He had been taking Tramadol for pain control. He states he has had 4 attacks with his kidneys in the last year. CT scan was ordered.     Today he presents after a 4 cm renal mass was discovered on CT on 5/26/25.   Stabbing pain 5/10 on left flank. Nausea. Still taking tramadol for the pain. He states this has happened 4 times within the last year.     Review of Symptoms:  Review of Systems   Constitutional:  Negative for chills and fever.   Respiratory:  Negative for shortness of breath and wheezing.    Cardiovascular:  Negative for chest pain.   Gastrointestinal:  Positive for abdominal pain and nausea. Negative for constipation, diarrhea and vomiting.   Genitourinary:  Positive for flank pain. Negative for dysuria, frequency, hematuria and urgency.     Patient History:  Past Medical History:   Diagnosis Date    Anticoagulant long-term use     Arthritis     Atrial fibrillation     Basal cell carcinoma     Chronic low back pain     DDD (degenerative disc disease), lumbar     History of colon polyps     HLD (hyperlipidemia)     Hypertension     Lumbar radiculopathy     Lumbar spondylosis     OA (osteoarthritis)     Personal history of PE (pulmonary embolism) 2001    Sleep apnea     cpap at night    Unilateral edema of lower extremity     Varicose  veins of both lower extremities     Venous insufficiency (chronic) (peripheral)      Past Surgical History:   Procedure Laterality Date    CARDIOVERSION      COLONOSCOPY  03/13/2020    COLONOSCOPY N/A 3/13/2020    Procedure: COLONOSCOPY;  Surgeon: Grant Meneses MD;  Location: [facility] ENDO;  Service: Endoscopy;  Laterality: N/A;    COLONOSCOPY N/A 6/12/2023    Procedure: COLONOSCOPY;  Surgeon: Cole Stevens MD;  Location: [facility] ENDO;  Service: Endoscopy;  Laterality: N/A;    CORONARY ANGIOGRAPHY Right 5/16/2019    Procedure: ANGIOGRAM, CORONARY ARTERY;  Surgeon: Nabil Tang MD;  Location: [facility] CATH LAB;  Service: Cardiology;  Laterality: Right;    INJECTION OF FACET JOINT Bilateral 11/11/2019    Procedure: FACET JOINT INJECTION (LUMBAR BLOCK) BILATERAL L4/5;  Surgeon: Trevin Root MD;  Location: [facility] PAIN MGT;  Service: Pain Management;  Laterality: Bilateral;  NEEDS CONSENT, PRADAXA     KNEE ARTHROSCOPY      LEFT HEART CATHETERIZATION Right 5/16/2019    Procedure: Left heart cath;  Surgeon: Nabil Tang MD;  Location: [facility] CATH LAB;  Service: Cardiology;  Laterality: Right;    NASAL SEPTUM SURGERY Left 2012     Family History   Problem Relation Name Age of Onset    No Known Problems Mother      No Known Problems Father       Social History[1]  Allergies:  Patient has no known allergies.  Medications:  Current Medications[2]    OBJECTIVE:     There were no vitals filed for this visit.     Physical Exam  Constitutional:       Appearance: Normal appearance.   HENT:      Head: Normocephalic.   Pulmonary:      Effort: Pulmonary effort is normal. No respiratory distress.      Breath sounds: No wheezing.   Abdominal:      General: There is no distension.      Tenderness: There is no abdominal tenderness.   Neurological:      Mental Status: He is alert.   Psychiatric:         Mood and Affect: Mood normal.     LAB:      All laboratory values listed below was/were independently reviewed with patient at  this clinic visit.    In office UA today was negative for infection and blood.     Lab Results   Component Value Date    BUN 9 06/04/2024    CREATININE 0.8 06/04/2024    WBC 5.97 06/04/2024    HGB 16.3 06/04/2024    HCT 47.5 06/04/2024     (L) 06/04/2024    AST 18 06/04/2024    ALT 17 06/04/2024    ALKPHOS 56 06/04/2024    ALBUMIN 3.6 06/04/2024    HGBA1C 5.1 11/02/2022     Lab Results   Component Value Date    PSA 3.4 10/24/2023    PSA 3.1 11/08/2018    PSA 3.2 04/18/2018     Lab Results   Component Value Date    CREATININE 0.8 06/04/2024    EGFRNORACEVR >60.0 06/04/2024     IMAGING:    All imaging listed below was/were independently reviewed with patient at this clinic visit.  5/26/25 CT Abdomen Pelvis without contast  KIDNEYS/URETERS: No renal stone or hydronephrosis.  Bilateral renal cysts.  Suspected mass in the right midpole measuring approximately 4.0 cm but difficult to differentiate from adjacent cortex (3:99).  Impression:  No renal stone or hydronephrosis.  Suspected 4.0 cm mass in the right kidney, which could represent a complex cyst or renal neoplasm.  Further evaluation with renal mass CT or abdominal MRI is recommended.    IMPRESSION:  Encounter Diagnoses   Name Primary?    Left upper quadrant abdominal pain     Kidney mass      ASSESSMENT/PLAN:     Plan: I spent a total of 30 minutes on the day of the visit. This includes face to face time and non-face to face time preparing to see the patient (eg, review of tests), obtaining and/or reviewing separately obtained history, documenting clinical information in the electronic or other health record, independently interpreting results and communicating results to the patient/family/caregiver, or care coordinator.  Reviewed the possible contributory factors.  Reviewed possible management if needed.  Recommendations for PCP follow up visit; any need to go to the ER.    Recommended lifestyle modifications with a proper, healthy diet, good hydration  "but during the day.  Benefits of regular exercise and weight loss.     Reassurance with today's in office UA.   Kidney function labs today.  CT Abd Pelvis ordered with and without contrast, renal mass protocol.  Follow up pending results.       YOKO Mendiola          [1]   Social History  Socioeconomic History    Marital status:    Tobacco Use    Smoking status: Every Day     Current packs/day: 1.00     Average packs/day: 1 pack/day for 48.0 years (48.0 ttl pk-yrs)     Types: Cigarettes    Smokeless tobacco: Never    Tobacco comments:     quit 1 week ago    Substance and Sexual Activity    Alcohol use: Yes     Alcohol/week: 1.0 standard drink of alcohol     Types: 1 Shots of liquor per week     Comment: occasionally    Drug use: No    Sexual activity: Yes     Partners: Female     Social Drivers of Health     Stress: No Stress Concern Present (1/19/2021)    [employer] - Occupational Stress Questionnaire     Feeling of Stress : Not at all   [2]   Current Outpatient Medications:     celecoxib (CELEBREX) 200 MG capsule, Take 1 capsule (200 mg total) by mouth 2 (two) times daily., Disp: 180 capsule, Rfl: 3    chlorhexidine (PERIDEX) 0.12 % solution, SMARTSIG:Topical As Directed, Disp: , Rfl:     dabigatran etexilate (PRADAXA) 150 mg Cap, Take 1 capsule (150 mg total) by mouth 2 (two) times daily. "Do NOT break, chew, or open capsules.", Disp: 180 capsule, Rfl: 3    garlic 1 mg Cap, Take 1 tablet by mouth once daily. , Disp: , Rfl:     HYDROcodone-acetaminophen (NORCO) 5-325 mg per tablet, Take 1 tablet by mouth every 6 (six) hours as needed., Disp: 30 tablet, Rfl: 0    meclizine (ANTIVERT) 25 mg tablet, One p.o. q.h.s. or if needed  take 1 p.o. q.8 hours p.r.n. dizziness or vertigo, Disp: 30 tablet, Rfl: 0    metoprolol succinate (TOPROL-XL) 25 MG 24 hr tablet, Take 2 tablets in the morning and 1 tablet in the evening, Disp: 180 tablet, Rfl: 2    metoprolol succinate " (TOPROL-XL) 25 MG 24 hr tablet, Two p.o. q.a.m. 1 p.o. q.p.m., Disp: 270 tablet, Rfl: 3    potassium chloride (MICRO-K) 10 MEQ CpSR, One p.o. q.d. with Lasix only, Disp: 90 capsule, Rfl: 3    PRADAXA 150 mg Cap, Take 1 capsule (150 mg total) by mouth 2 (two) times daily., Disp: 180 capsule, Rfl: 0    predniSONE (DELTASONE) 20 MG tablet, Take 1 tablet (20 mg total) by mouth once daily., Disp: 10 tablet, Rfl: 0    traMADoL (ULTRAM) 50 mg tablet, Take 1 tablet (50 mg total) by mouth every 6 (six) hours as needed., Disp: 30 tablet, Rfl: 0  No current facility-administered medications for this visit.    Facility-Administered Medications Ordered in Other Visits:     0.9%  NaCl infusion, , Intravenous, Continuous, Cole Stevens MD    lactated ringers infusion, , Intravenous, Continuous, Grant Meneses MD, New Bag at 03/13/20 0919    LIDOcaine (PF) 10 mg/ml (1%) injection 10 mg, 1 mL, Intradermal, Once PRN, Cole Stevens MD

## 2025-05-29 ENCOUNTER — RESULTS FOLLOW-UP (OUTPATIENT)
Dept: UROLOGY | Facility: CLINIC | Age: 75
End: 2025-05-29

## 2025-05-29 ENCOUNTER — NURSE TRIAGE (OUTPATIENT)
Dept: ADMINISTRATIVE | Facility: CLINIC | Age: 75
End: 2025-05-29
Payer: MEDICARE

## 2025-05-29 DIAGNOSIS — R10.84 GENERALIZED ABDOMINAL PAIN: Primary | ICD-10-CM

## 2025-05-29 DIAGNOSIS — N28.89 KIDNEY MASS: Primary | ICD-10-CM

## 2025-05-29 NOTE — TELEPHONE ENCOUNTER
Follow up to recent contact. Pt calling back to check on referral for possible gallbladder issues. Has been having pain and has been cleared from a urology standpoint. Spoke with his pcp this morning and was told a referral would be placed. Advised pt per chart review, a referral to general surgery has been pended. Pt VU. No further assistance needed.  Reason for Disposition   Follow-up information-only call to recent contact, no triage required    Protocols used: Information Only Call - No Triage-A-[place]

## 2025-05-29 NOTE — TELEPHONE ENCOUNTER
"Ref. For general surgery pended below regarding gallbladder - pt. States Dr. LEW called him this am on his cell and told him "He would get with the staff"   "

## 2025-05-29 NOTE — TELEPHONE ENCOUNTER
Pt. Has a renal mass based off his CT scan that was discussed between the pt. And Dr. Bradford yesterday - he said the pt. Needed to follow up with Urology - nothing about his gallbladder - looks like pt. Already followed up with urology as they ordered an US

## 2025-05-29 NOTE — TELEPHONE ENCOUNTER
Pt spoke with his pcp earlier today. He was supposed to have contacted his office to get referral placed for a specialist regarding his gallbladder. Pt asking if that has happened yet. Advised pt her chart review, I don't see any new referrals placed. Pt requesting a message be sent to Dr. Bradford's staff asking they reach out to Dr. Bradford to see what needs to be done. No further assistance needed.  Reason for Disposition   Follow-up information-only call to recent contact, no triage required    Protocols used: Information Only Call - No Triage-A-[place]

## 2025-05-30 ENCOUNTER — HOSPITAL ENCOUNTER (EMERGENCY)
Facility: HOSPITAL | Age: 75
Discharge: HOME | End: 2025-05-30
Attending: EMERGENCY MEDICINE
Payer: MEDICARE

## 2025-05-30 ENCOUNTER — NURSE TRIAGE (OUTPATIENT)
Dept: ADMINISTRATIVE | Facility: CLINIC | Age: 75
End: 2025-05-30
Payer: MEDICARE

## 2025-05-30 ENCOUNTER — TELEPHONE (OUTPATIENT)
Dept: UROLOGY | Facility: CLINIC | Age: 75
End: 2025-05-30
Payer: MEDICARE

## 2025-05-30 VITALS
OXYGEN SATURATION: 99 % | RESPIRATION RATE: 15 BRPM | BODY MASS INDEX: 38.05 KG/M2 | TEMPERATURE: 98 F | HEIGHT: 75 IN | DIASTOLIC BLOOD PRESSURE: 78 MMHG | HEART RATE: 79 BPM | SYSTOLIC BLOOD PRESSURE: 133 MMHG | WEIGHT: 306 LBS

## 2025-05-30 DIAGNOSIS — R10.9 LEFT FLANK PAIN: Primary | ICD-10-CM

## 2025-05-30 DIAGNOSIS — R59.0 HILAR ADENOPATHY: ICD-10-CM

## 2025-05-30 LAB
ABSOLUTE EOSINOPHIL (OHS): 0.23 K/UL
ABSOLUTE MONOCYTE (OHS): 0.65 K/UL (ref 0.3–1)
ABSOLUTE NEUTROPHIL COUNT (OHS): 4.05 K/UL (ref 1.8–7.7)
ALBUMIN SERPL BCP-MCNC: 3.8 G/DL (ref 3.5–5.2)
ALP SERPL-CCNC: 78 UNIT/L (ref 40–150)
ALT SERPL W/O P-5'-P-CCNC: 21 UNIT/L (ref 10–44)
ANION GAP (OHS): 13 MMOL/L (ref 8–16)
AST SERPL-CCNC: 19 UNIT/L (ref 11–45)
BASOPHILS # BLD AUTO: 0.02 K/UL
BASOPHILS NFR BLD AUTO: 0.3 %
BILIRUB SERPL-MCNC: 1.1 MG/DL (ref 0.1–1)
BILIRUB UR QL STRIP.AUTO: NEGATIVE
BUN SERPL-MCNC: 11 MG/DL (ref 8–23)
CALCIUM SERPL-MCNC: 9.2 MG/DL (ref 8.7–10.5)
CHLORIDE SERPL-SCNC: 106 MMOL/L (ref 95–110)
CLARITY UR: CLEAR
CO2 SERPL-SCNC: 21 MMOL/L (ref 23–29)
COLOR UR AUTO: COLORLESS
CREAT SERPL-MCNC: 0.9 MG/DL (ref 0.5–1.4)
ERYTHROCYTE [DISTWIDTH] IN BLOOD BY AUTOMATED COUNT: 13 % (ref 11.5–14.5)
GFR SERPLBLD CREATININE-BSD FMLA CKD-EPI: >60 ML/MIN/1.73/M2
GLUCOSE SERPL-MCNC: 103 MG/DL (ref 70–110)
GLUCOSE UR QL STRIP: NEGATIVE
HCT VFR BLD AUTO: 47.1 % (ref 40–54)
HGB BLD-MCNC: 15.6 GM/DL (ref 14–18)
HGB UR QL STRIP: NEGATIVE
HOLD SPECIMEN: NORMAL
IMM GRANULOCYTES # BLD AUTO: 0.05 K/UL (ref 0–0.04)
IMM GRANULOCYTES NFR BLD AUTO: 0.8 % (ref 0–0.5)
KETONES UR QL STRIP: NEGATIVE
LEUKOCYTE ESTERASE UR QL STRIP: NEGATIVE
LIPASE SERPL-CCNC: 27 U/L (ref 4–60)
LYMPHOCYTES # BLD AUTO: 1.64 K/UL (ref 1–4.8)
MCH RBC QN AUTO: 31.6 PG (ref 27–31)
MCHC RBC AUTO-ENTMCNC: 33.1 G/DL (ref 32–36)
MCV RBC AUTO: 95 FL (ref 82–98)
NITRITE UR QL STRIP: NEGATIVE
NUCLEATED RBC (/100WBC) (OHS): 0 /100 WBC
PH UR STRIP: 7 [PH]
PLATELET # BLD AUTO: 169 K/UL (ref 150–450)
PMV BLD AUTO: 10.4 FL (ref 9.2–12.9)
POTASSIUM SERPL-SCNC: 3.9 MMOL/L (ref 3.5–5.1)
PROT SERPL-MCNC: 7 GM/DL (ref 6–8.4)
PROT UR QL STRIP: NEGATIVE
RBC # BLD AUTO: 4.94 M/UL (ref 4.6–6.2)
RELATIVE EOSINOPHIL (OHS): 3.5 %
RELATIVE LYMPHOCYTE (OHS): 24.7 % (ref 18–48)
RELATIVE MONOCYTE (OHS): 9.8 % (ref 4–15)
RELATIVE NEUTROPHIL (OHS): 60.9 % (ref 38–73)
SODIUM SERPL-SCNC: 140 MMOL/L (ref 136–145)
SP GR UR STRIP: <1.005
UROBILINOGEN UR STRIP-ACNC: NEGATIVE EU/DL
WBC # BLD AUTO: 6.64 K/UL (ref 3.9–12.7)

## 2025-05-30 PROCEDURE — 81003 URINALYSIS AUTO W/O SCOPE: CPT | Performed by: STUDENT IN AN ORGANIZED HEALTH CARE EDUCATION/TRAINING PROGRAM

## 2025-05-30 PROCEDURE — 80051 ELECTROLYTE PANEL: CPT | Performed by: STUDENT IN AN ORGANIZED HEALTH CARE EDUCATION/TRAINING PROGRAM

## 2025-05-30 PROCEDURE — 99284 EMERGENCY DEPT VISIT MOD MDM: CPT | Mod: 25

## 2025-05-30 PROCEDURE — 85025 COMPLETE CBC W/AUTO DIFF WBC: CPT | Performed by: STUDENT IN AN ORGANIZED HEALTH CARE EDUCATION/TRAINING PROGRAM

## 2025-05-30 PROCEDURE — 25000003 PHARM REV CODE 250

## 2025-05-30 PROCEDURE — 83690 ASSAY OF LIPASE: CPT | Performed by: STUDENT IN AN ORGANIZED HEALTH CARE EDUCATION/TRAINING PROGRAM

## 2025-05-30 PROCEDURE — 25500020 PHARM REV CODE 255: Performed by: EMERGENCY MEDICINE

## 2025-05-30 RX ORDER — LIDOCAINE 50 MG/G
1 PATCH TOPICAL DAILY
Qty: 14 PATCH | Refills: 0 | Status: SHIPPED | OUTPATIENT
Start: 2025-05-30 | End: 2025-06-13

## 2025-05-30 RX ORDER — ACETAMINOPHEN 500 MG
1000 TABLET ORAL
Status: COMPLETED | OUTPATIENT
Start: 2025-05-30 | End: 2025-05-30

## 2025-05-30 RX ORDER — LIDOCAINE 50 MG/G
1 PATCH TOPICAL
Status: DISCONTINUED | OUTPATIENT
Start: 2025-05-30 | End: 2025-05-30 | Stop reason: HOSPADM

## 2025-05-30 RX ORDER — TRAMADOL HYDROCHLORIDE 50 MG/1
50 TABLET, FILM COATED ORAL
Status: COMPLETED | OUTPATIENT
Start: 2025-05-30 | End: 2025-05-30

## 2025-05-30 RX ADMIN — ACETAMINOPHEN 1000 MG: 500 TABLET ORAL at 04:05

## 2025-05-30 RX ADMIN — TRAMADOL HYDROCHLORIDE 50 MG: 50 TABLET, COATED ORAL at 04:05

## 2025-05-30 RX ADMIN — IOHEXOL 100 ML: 350 INJECTION, SOLUTION INTRAVENOUS at 04:05

## 2025-05-30 RX ADMIN — LIDOCAINE 1 PATCH: 50 PATCH CUTANEOUS at 04:05

## 2025-05-30 NOTE — FIRST PROVIDER EVALUATION
"Medical screening examination initiated.  I have conducted a focused provider triage encounter, findings are as follows:    Brief history of present illness:  Having pain to L flank since last Friday. Has had workup and discussion with urology, they do not think pain is from a stone. Reports nausea. Denies no dysuria, fevers, or hematuria.     Vitals:    05/30/25 1338   BP: 134/89   BP Location: Right arm   Pulse: 93   Resp: 16   Temp: 97.4 °F (36.3 °C)   TempSrc: Oral   SpO2: 96%   Weight: (!) 138.8 kg (306 lb)   Height: 6' 3" (1.905 m)       Pertinent physical exam:  Ambulatory, no acute distress    Brief workup plan:  labs    Preliminary workup initiated; this workup will be continued and followed by the physician or advanced practice provider that is assigned to the patient when roomed.  "

## 2025-05-30 NOTE — TELEPHONE ENCOUNTER
I spoke to patient yesterday 5/29 around 10 am via telephone and discussed findings of most recent CT scan and informed we will monitor stable right renal cyst. He was informed to follow up with is primary care provider for any ongoing symptoms.     Spoke to patient on telephone around 11:30 am today after he contacted the [facility] on call line.    Discussed that his pain is not coming from a urologic standpoint according to CT scan results from 5/26 and 5/28.   Reviewed scans with Dr. Wells and he agreed with my findings.     Advised to go to ER if nausea and stabbing pain persists. Patient verbalized understanding.

## 2025-05-30 NOTE — ED NOTES
Patient identifiers for Shon Asencio 74 y.o. male checked and correct.  Chief Complaint   Patient presents with    Flank Pain     Left sided starting last Friday.  Went to PCP and urology with no dx.  Pain is persistent.  +nausea     Past Medical History:   Diagnosis Date    Anticoagulant long-term use     Arthritis     Atrial fibrillation     Basal cell carcinoma     Chronic low back pain     DDD (degenerative disc disease), lumbar     History of colon polyps     HLD (hyperlipidemia)     Hypertension     Lumbar radiculopathy     Lumbar spondylosis     OA (osteoarthritis)     Personal history of PE (pulmonary embolism) 2001    Sleep apnea     cpap at night    Unilateral edema of lower extremity     Varicose veins of both lower extremities     Venous insufficiency (chronic) (peripheral)      Allergies reported: Review of patient's allergies indicates:  No Known Allergies      LOC: Patient is awake, alert, and aware of environment with an appropriate affect. Patient is oriented x 4 and speaking appropriately.  APPEARANCE: Patient resting comfortably and in no acute distress. Patient is clean and well groomed, patient's clothing is properly fastened.  HEENT: WDL  SKIN: The skin is warm and dry. Patient has normal skin turgor and moist mucus membranes.   MUSCULOSKELETAL: Patient is moving all extremities well, no obvious deformities noted. Pulses intact.   RESPIRATORY: Airway is open and patent. Respirations are spontaneous and non-labored with normal effort and rate.  CARDIAC: Patient has a normal rate and rhythm. 88 on cardiac monitor. No peripheral edema noted. Denies chest pain and SOB at at time of assessment.   ABDOMEN: No distention noted. Soft and non-tender upon palpation.  NEUROLOGICAL: pupils 3mm, PERRL. Facial expression is symmetrical. Hand grasps are equal bilaterally. Normal sensation in all extremities when touched with finger.

## 2025-05-30 NOTE — TELEPHONE ENCOUNTER
"Originally calling in because pt reports that his PCP was supposed to send in referral to general surgery does not know if this was in relation to gallbladder or to kidney, pt mentioned both but is unsure which one. Pt extremely frustrated says he can not reach PCP office directly.    Pt also reports constant pain even on tramadol, reports lower back L pain. Reports pain has been going on x1 week.    In last 24 hours, he now has a stabbing pain in R lower side of back which is fleeting.    Reports nausea as well    Dispo is Go to ED Now (or to Office with PCP Approval)    Reports that he wants to see someone today in General Surgery.     Reached out to on-call urology, did not receive call back. Reached out to pt's established FNP who advised, "I would advise going to the ED for evaluation. He was recently diagnosed with a Right kidney complex cyst that I was planning to monitor. Kidney function was within normal limits."    Did advise ER. Pt refusing. Did make FNP aware and she told me she will escalate this to MD and have them review his chart.    Pt did request that I send message to PCP office requesting callback. I reiterated ER dispo multiple times.   Pt still refusing. Did send urgent message to PCP office for follow up.       Reason for Disposition   Patient sounds very sick or weak to the triager    Additional Information   Negative: Passed out (e.g., fainted, lost consciousness, blacked out and was not responding)   Negative: Shock suspected (e.g., cold/pale/clammy skin, too weak to stand, low BP, rapid pulse)   Negative: Sounds like a life-threatening emergency to the triager   Negative: SEVERE back pain of sudden onset and age > 60 years   Negative: SEVERE abdominal pain (e.g., excruciating)   Negative: Abdominal pain and age > 60 years   Negative: Unable to urinate (or only a few drops) and bladder feels very full   Negative: Loss of bladder or bowel control (urine or bowel incontinence; wetting self, " leaking stool) of new-onset   Negative: Numbness (loss of sensation) in groin or rectal area   Negative: Pain radiates into groin, scrotum   Negative: Blood in urine (red, pink, or tea-colored)   Negative: Vomiting and pain over lower ribs of back (i.e., flank - kidney area)   Negative: Weakness of a leg or foot (e.g., unable to bear weight, dragging foot)   Negative: Shock suspected (e.g., cold/pale/clammy skin, too weak to stand, low BP, rapid pulse)   Negative: Sounds like a life-threatening emergency to the triager   Negative: Difficulty breathing   Negative: Unable to walk, or can only walk with assistance (e.g., requires support)    Protocols used: Back Pain-A-[place], Nausea-A-[place]

## 2025-05-31 NOTE — ED PROVIDER NOTES
Encounter Date: 5/30/2025       History     Chief Complaint   Patient presents with    Flank Pain     Left sided starting last Friday.  Went to PCP and urology with no dx.  Pain is persistent.  +nausea     74-year-old male with a past medical history of degenerative disc disease, hyperlipidemia, lumbar radiculopathy, presents with a chief complaint of left flank pain.  The patient says he has already had 2 CT scans in all they revealed was a right-sided renal cyst.  But the patient is having persistent pain.  He says that this has occurred episodically over the past year.  He has a history of remote rib fractures on the left side, but denies any recent trauma.  He also denies any chest pain, shortness of breath, nausea, vomiting, abdominal pain, diarrhea, dysuria, blood in his urine or stool or new rashes or unintentional weight loss.  He is currently still smoking.    The history is provided by the patient. No  was used.     Review of patient's allergies indicates:  No Known Allergies  Past Medical History:   Diagnosis Date    Anticoagulant long-term use     Arthritis     Atrial fibrillation     Basal cell carcinoma     Chronic low back pain     DDD (degenerative disc disease), lumbar     History of colon polyps     HLD (hyperlipidemia)     Hypertension     Lumbar radiculopathy     Lumbar spondylosis     OA (osteoarthritis)     Personal history of PE (pulmonary embolism) 2001    Sleep apnea     cpap at night    Unilateral edema of lower extremity     Varicose veins of both lower extremities     Venous insufficiency (chronic) (peripheral)      Past Surgical History:   Procedure Laterality Date    CARDIOVERSION      COLONOSCOPY  03/13/2020    COLONOSCOPY N/A 3/13/2020    Procedure: COLONOSCOPY;  Surgeon: Grant Meneses MD;  Location: [facility];  Service: Endoscopy;  Laterality: N/A;    COLONOSCOPY N/A 6/12/2023    Procedure: COLONOSCOPY;  Surgeon: Cole Stevens MD;  Location: [facility]  ENDO;  Service: Endoscopy;  Laterality: N/A;    CORONARY ANGIOGRAPHY Right 5/16/2019    Procedure: ANGIOGRAM, CORONARY ARTERY;  Surgeon: Nabil Tang MD;  Location: [facility] CATH LAB;  Service: Cardiology;  Laterality: Right;    INJECTION OF FACET JOINT Bilateral 11/11/2019    Procedure: FACET JOINT INJECTION (LUMBAR BLOCK) BILATERAL L4/5;  Surgeon: Trevin Root MD;  Location: [facility] PAIN MGT;  Service: Pain Management;  Laterality: Bilateral;  NEEDS CONSENT, PRADAXA     KNEE ARTHROSCOPY      LEFT HEART CATHETERIZATION Right 5/16/2019    Procedure: Left heart cath;  Surgeon: Nabil Tang MD;  Location: [facility] CATH LAB;  Service: Cardiology;  Laterality: Right;    NASAL SEPTUM SURGERY Left 2012     Family History   Problem Relation Name Age of Onset    No Known Problems Mother      No Known Problems Father       Social History[1]  Review of Systems    Physical Exam     Initial Vitals [05/30/25 1338]   BP Pulse Resp Temp SpO2   134/89 93 16 97.4 °F (36.3 °C) 96 %      MAP       --         Physical Exam    Nursing note and vitals reviewed.  Constitutional: He appears well-developed and well-nourished. He is not diaphoretic. No distress.   HENT:   Head: Normocephalic.   Eyes: Pupils are equal, round, and reactive to light.   Neck: Neck supple.   Cardiovascular:  Normal rate, regular rhythm, normal heart sounds and intact distal pulses.           Pulmonary/Chest: Breath sounds normal. He has no wheezes. He has no rhonchi. He has no rales.   Abdominal: Abdomen is soft. There is no abdominal tenderness. There is no rebound and no guarding.   Musculoskeletal:         General: No tenderness or edema. Normal range of motion.      Cervical back: Neck supple.      Comments: PT has point tenderness immediately below the left scapula over the rib     Neurological: He is alert and oriented to person, place, and time. He has normal strength.   Skin: Skin is warm and dry. Capillary refill takes less than 2 seconds. No rash  noted. No erythema. No pallor.   Psychiatric: He has a normal mood and affect. His behavior is normal. Judgment and thought content normal.         ED Course   Procedures  Labs Reviewed   COMPREHENSIVE METABOLIC PANEL - Abnormal       Result Value    Sodium 140      Potassium 3.9      Chloride 106      CO2 21 (*)     Glucose 103      BUN 11      Creatinine 0.9      Calcium 9.2      Protein Total 7.0      Albumin 3.8      Bilirubin Total 1.1 (*)     ALP 78      AST 19      ALT 21      Anion Gap 13      eGFR >60     URINALYSIS, REFLEX TO URINE CULTURE - Abnormal    Color, UA Colorless (*)     Appearance, UA Clear      pH, UA 7.0      Spec Grav UA <1.005 (*)     Protein, UA Negative      Glucose, UA Negative      Ketones, UA Negative      Bilirubin, UA Negative      Blood, UA Negative      Nitrites, UA Negative      Urobilinogen, UA Negative      Leukocyte Esterase, UA Negative     CBC WITH DIFFERENTIAL - Abnormal    WBC 6.64      RBC 4.94      HGB 15.6      HCT 47.1      MCV 95      MCH 31.6 (*)     MCHC 33.1      RDW 13.0      Platelet Count 169      MPV 10.4      Nucleated RBC 0      Neut % 60.9      Lymph % 24.7      Mono % 9.8      Eos % 3.5      Basophil % 0.3      Imm Grans % 0.8 (*)     Neut # 4.05      Lymph # 1.64      Mono # 0.65      Eos # 0.23      Baso # 0.02      Imm Grans # 0.05 (*)    LIPASE - Normal    Lipase Level 27     CBC W/ AUTO DIFFERENTIAL    Narrative:     The following orders were created for panel order CBC auto differential.  Procedure                               Abnormality         Status                     ---------                               -----------         ------                     CBC with Differential[[id number]]       Abnormal            Final result                 Please view results for these tests on the individual orders.   GREY TOP URINE HOLD    Extra Tube Hold for add-ons.            Imaging Results               CT Chest With Contrast (Final result)  Result time  05/30/25 19:13:17      Final result by Bharat Hall MD (05/30/25 19:13:17)                   Impression:      1. Bridging bone formation between the lateral left 4th and 5th ribs, likely posttraumatic in etiology as this was not visualized on CT chest from 05/20/2010.  Additional bony deformities of the left 5th-9th ribs from previously healed fractures.  No acute fractures or suspicious bony lesions.  2. Enlarged mediastinal and bilateral hilar lymph nodes.  Nonspecific finding, may be reactive, however malignancy cannot be excluded.  3. Lower lung zone predominant bilateral subpleural reticular opacities with interlobular septal thickening.  Findings are nonspecific, however may be seen with interstitial lung disease.  No consolidation.  4. Additional findings, as above.  This report was flagged in Epic as abnormal.    Electronically signed by resident: Michael Ortiz  Date:    05/30/2025  Time:    17:15    Electronically signed by: Bharat Hall MD  Date:    05/30/2025  Time:    19:13               Narrative:    EXAMINATION:  CT CHEST WITH CONTRAST    CLINICAL HISTORY:  Left flank pain; please extend down to includ all ribs!!!;    TECHNIQUE:  Low dose axial images, sagittal and coronal reformations were obtained from the thoracic inlet to the lung bases following the IV administration of 100 mL of Omnipaque 350.    COMPARISON:  CT chest, 05/20/2010, CT abdomen pelvis 05/28/2025, 05/26/2025, x-ray chest 12/02/2024    FINDINGS:  BASE OF NECK: No significant abnormality.  The thyroid gland is within normal limits.    THORACIC SOFT TISSUES: Unremarkable.    ESOPHAGUS: Not well distended for full evaluation, but grossly unremarkable.    AORTA: Left-sided aortic arch with normal three vessel branching pattern.  No aneurysm.  Aorta maintains normal caliber, contour, and course.  Moderate calcific atherosclerosis of the thoracic aorta.    HEART: Normal size with physiologic pericardial fluid and no pericardial  calcification.    PULMONARY VASCULATURE: Pulmonary arteries distribute normally.  No central filling defects.    ELIEZER/MEDIASTINUM: Numerous prominent mediastinal lymph nodes, including enlarged right paratracheal node measuring 1.4 cm (series 2, image 53), and enlarged AP window nodes measuring 1.0 cm (series 2, image 67) and 1.3 cm.  Additional bilateral enlarged hilar nodes, including right hilar node measuring 1.6 cm (series 2, image 59) and left hilar node measuring 1.4 cm (series 2, image 71).  Increased in size and number since 2010.    AIRWAYS: Trachea and proximal airways remain patent.    LUNGS/PLEURA: Lungs are symmetrically well expanded.  No focal consolidation, mass, pleural effusion or pneumothorax.  Lower lung zone predominant subpleural reticular opacities bilaterally with interlobular septal thickening, progressed since 2010.  Simple cyst in the left lower lobe.  Left upper lobe few tiny calcified granulomas.  3 mm solid nodule along the left major fissure likely fissural node, not significantly changed since 2010.    UPPER ABDOMEN: Subcentimeter hypodense focus in the right hepatic lobe (series 2, image 138) too small to characterize by CT.  In the right kidney there is a 4.2 x 3.6 cm lesion hypoattenuating compared to adjacent parenchyma, better characterized on previous CT abdomen pelvis.  Two simple left renal cysts, the largest measuring 2.4 x 2.3 cm.  Additional subcentimeter hypoattenuating foci in the kidneys too small to characterize by CT.  Suspected left renal peripelvic cysts.    BONES: Fracture deformities from previously healed mildly displaced fractures of the left posterolateral ribs 5-9.  Bridging bone formation from the lateral left 4th and 5th ribs (series 302, image 214), not visualized on CT chest from 05/20/2010.  Bony deformity of the left scapula from previous healed fracture.  No acute fractures. No suspicious lytic or sclerotic lesions. Degenerative changes of the  visualized spine with flowing osteophytes along the anterior vertebral bodies (ie diffuse idiopathic skeletal hyperostosis)..                                       Medications   acetaminophen tablet 1,000 mg (1,000 mg Oral Given 5/30/25 1624)   traMADoL tablet 50 mg (50 mg Oral Given 5/30/25 1624)   iohexoL (OMNIPAQUE 350) injection 100 mL (100 mLs Intravenous Given 5/30/25 1655)     Medical Decision Making  See ED course for remainder of care    Amount and/or Complexity of Data Reviewed  Labs:  Decision-making details documented in ED Course.  Radiology: ordered. Decision-making details documented in ED Course.    Risk  OTC drugs.  Prescription drug management.               ED Course as of 05/30/25 2300   Fri May 30, 2025   1514 74-year-old male in no acute distress.  Patient's vital signs are all within normal limits.  Physical exam is significant for point tenderness in the left flank.  Differential includes but is not limited to bony metastasis versus pathologic fracture versus muscle strain versus pneumothorax versus pleural effusion versus empyema, I reviewed the patient's prior imaging, there was no evidence of renal stone or other findings to explain the patient's pain.  I also considered PE, however the patient is not hypoxic, short of breath or complaining of dyspnea on exertion, I have overall low suspicion [BP]   1805 CT Chest With Contrast  Impression:     1. Bridging bone formation between the lateral left 4th and 5th ribs, likely posttraumatic in etiology as this was not visualized on CT chest from 05/20/2010.  Additional bony deformities of the left 5th-9th ribs from previously healed fractures.  No acute fractures or suspicious bony lesions.  2. Enlarged mediastinal and bilateral hilar lymph nodes.  Nonspecific finding, may be reactive, however malignancy cannot be excluded.  3. Lower lung zone predominant bilateral subpleural reticular opacities with interlobular septal thickening.  Findings are  nonspecific, however may be seen with interstitial lung disease.  No consolidation.       [BP]   2255 CBC auto differential(!)  WNL [BP]   2255 Urinalysis, Reflex to Urine Culture Urine, Clean Catch(!)  Not concerning for infection or stone or malignancy [BP]   2257 Comprehensive metabolic panel(!) [BP]   2258 I discussed the patient's perihilar lymphadenopathy with them and advised he should follow up with his primary doctor about this.  The patient says that the combination of pain medications was very effective for his pain.  I advised that he can continue these at home.  I stressed that I did not find a likely candidate for the patient's pain, however I do not think there was an emergent cause.  I discussed return precautions with them and answered all questions.  I provided him with discharge paperwork and the patient was discharged in stable condition. [BP]      ED Course User Index  [BP] Sam Alvarez MD                           Clinical Impression:  Final diagnoses:  [R10.9] Left flank pain (Primary)  [R59.0] Hilar adenopathy          ED Disposition Condition    Discharge Stable          ED Prescriptions       Medication Sig Dispense Start Date End Date Auth. Provider    LIDOcaine (LIDODERM) 5 % Place 1 patch onto the skin once daily. Remove & Discard patch within 12 hours or as directed by MD for 14 days 14 patch 5/30/2025 6/13/2025 Sam Alvarez MD          Follow-up Information       Follow up With Specialties Details Why Contact Info    Driss Bradford MD Family Medicine  As needed, If symptoms worsen [address] [place] [id number]  [phone]                   Sam Alvarez MD  Resident  05/30/25 3465         [1]   Social History  Tobacco Use    Smoking status: Every Day     Current packs/day: 1.00     Average packs/day: 1 pack/day for 48.0 years (48.0 ttl pk-yrs)     Types: Cigarettes    Smokeless tobacco: Never    Tobacco comments:     quit 1 week ago    Substance Use Topics     Alcohol use: Yes     Alcohol/week: 1.0 standard drink of alcohol     Types: 1 Shots of liquor per week     Comment: occasionally    Drug use: No        Sam Alvarez MD  Resident  05/30/25 0288

## 2025-05-31 NOTE — DISCHARGE INSTRUCTIONS
Diagnosis: Left Flank Pain    Tests today showed:   Labs Reviewed   COMPREHENSIVE METABOLIC PANEL - Abnormal       Result Value    Sodium 140      Potassium 3.9      Chloride 106      CO2 21 (*)     Glucose 103      BUN 11      Creatinine 0.9      Calcium 9.2      Protein Total 7.0      Albumin 3.8      Bilirubin Total 1.1 (*)     ALP 78      AST 19      ALT 21      Anion Gap 13      eGFR >60     URINALYSIS, REFLEX TO URINE CULTURE - Abnormal    Color, UA Colorless (*)     Appearance, UA Clear      pH, UA 7.0      Spec Grav UA <1.005 (*)     Protein, UA Negative      Glucose, UA Negative      Ketones, UA Negative      Bilirubin, UA Negative      Blood, UA Negative      Nitrites, UA Negative      Urobilinogen, UA Negative      Leukocyte Esterase, UA Negative     CBC WITH DIFFERENTIAL - Abnormal    WBC 6.64      RBC 4.94      HGB 15.6      HCT 47.1      MCV 95      MCH 31.6 (*)     MCHC 33.1      RDW 13.0      Platelet Count 169      MPV 10.4      Nucleated RBC 0      Neut % 60.9      Lymph % 24.7      Mono % 9.8      Eos % 3.5      Basophil % 0.3      Imm Grans % 0.8 (*)     Neut # 4.05      Lymph # 1.64      Mono # 0.65      Eos # 0.23      Baso # 0.02      Imm Grans # 0.05 (*)    LIPASE - Normal    Lipase Level 27     CBC W/ AUTO DIFFERENTIAL    Narrative:     The following orders were created for panel order CBC auto differential.  Procedure                               Abnormality         Status                     ---------                               -----------         ------                     CBC with Differential[[id number]]       Abnormal            Final result                 Please view results for these tests on the individual orders.   GREY TOP URINE HOLD    Extra Tube Hold for add-ons.       CT Chest With Contrast   Final Result   Abnormal      1. Bridging bone formation between the lateral left 4th and 5th ribs, likely posttraumatic in etiology as this was not visualized on CT chest from  05/20/2010.  Additional bony deformities of the left 5th-9th ribs from previously healed fractures.  No acute fractures or suspicious bony lesions.   2. Enlarged mediastinal and bilateral hilar lymph nodes.  Nonspecific finding, may be reactive, however malignancy cannot be excluded.   3. Lower lung zone predominant bilateral subpleural reticular opacities with interlobular septal thickening.  Findings are nonspecific, however may be seen with interstitial lung disease.  No consolidation.   4. Additional findings, as above.   This report was flagged in Epic as abnormal.      Electronically signed by resident: Michael Ortiz   Date:    05/30/2025   Time:    17:15      Electronically signed by: Bharat Hall MD   Date:    05/30/2025   Time:    19:13          Treatments you had today:   Medications   LIDOcaine 5 % patch 1 patch (1 patch Transdermal Patch Applied 5/30/25 1624)   acetaminophen tablet 1,000 mg (1,000 mg Oral Given 5/30/25 1624)   traMADoL tablet 50 mg (50 mg Oral Given 5/30/25 1624)   iohexoL (OMNIPAQUE 350) injection 100 mL (100 mLs Intravenous Given 5/30/25 1655)       Follow-Up Plan:  - Follow-up with primary care doctor within 3 - 5 days  - Additional testing and/or evaluation as directed by your primary doctor    Return to the Emergency Department for symptoms including but not limited to: worsening symptoms, shortness of breath or chest pain, vomiting with inability to hold down fluids, fevers greater than 100.4°F, passing out/fainting/unconsciousness, or other concerning symptoms.

## (undated) DEVICE — GLOVE BIOGEL SKINSENSE PI 8.0

## (undated) DEVICE — PAD ELECTRODE STER 1.5X3

## (undated) DEVICE — COVER SURG LIGHT HANDLE

## (undated) DEVICE — NDL 18GA X1 1/2 REG BEVEL

## (undated) DEVICE — TOURNIQUET SB QC SP 34X4IN

## (undated) DEVICE — SKIN MARKER DEVON 160

## (undated) DEVICE — SEE MEDLINE ITEM 157131

## (undated) DEVICE — CLOSURE SKIN STERI STRIP 1/2X4

## (undated) DEVICE — BANDAGE ACE NON LATEX 4IN

## (undated) DEVICE — DRESSING LEUKOPLAST FLEX 1X3IN

## (undated) DEVICE — SYR 30CC LUER LOCK

## (undated) DEVICE — PAD COLD THERAPY KNEE WRAP ON

## (undated) DEVICE — SEE MEDLINE ITEM 157169

## (undated) DEVICE — DRESSING XEROFORM FOIL PK 1X8

## (undated) DEVICE — TUBE SET INFLOW/OUTFLOW

## (undated) DEVICE — UNDERGLOVES BIOGEL PI SIZE 8

## (undated) DEVICE — SOL 9P NACL IRR PIC IL

## (undated) DEVICE — GAUZE SPONGE 4X4 12PLY

## (undated) DEVICE — APPLICATOR CHLORAPREP ORN 26ML

## (undated) DEVICE — DRAPE STERI U-SHAPED 47X51IN

## (undated) DEVICE — PAD ABD 8X10 STERILE

## (undated) DEVICE — SHAVER ULTRAFFR 4.2MM

## (undated) DEVICE — DRESSING SPONGE 16PLY 4X4 NS

## (undated) DEVICE — ADHESIVE MASTISOL VIAL 48/BX

## (undated) DEVICE — SUT MONOCRYL 4-0 PS-2

## (undated) DEVICE — Device

## (undated) DEVICE — SOL IRR NACL .9% 3000ML

## (undated) DEVICE — DRAPE STERI INSTRUMENT 1018

## (undated) DEVICE — SEE MEDLINE ITEM 157150

## (undated) DEVICE — PADDING CAST 6 INCH

## (undated) DEVICE — BANDAGE ACE ELASTIC 6"